# Patient Record
Sex: FEMALE | Race: OTHER | Employment: OTHER | URBAN - METROPOLITAN AREA
[De-identification: names, ages, dates, MRNs, and addresses within clinical notes are randomized per-mention and may not be internally consistent; named-entity substitution may affect disease eponyms.]

---

## 2018-05-30 ENCOUNTER — HOSPITAL ENCOUNTER (OUTPATIENT)
Age: 73
Discharge: HOME | End: 2018-05-30
Payer: MEDICARE

## 2018-05-30 LAB
ALBUMIN SERPL-MCNC: 4 G/DL (ref 3.5–5.2)
ALBUMIN/GLOBULIN RATIO: 1.4 (ref 1–2.5)
ALP BLD-CCNC: 65 U/L (ref 35–104)
ALT SERPL-CCNC: 16 U/L (ref 5–33)
ANION GAP SERPL CALCULATED.3IONS-SCNC: 10 MMOL/L (ref 9–17)
AST SERPL-CCNC: 23 U/L
BILIRUB SERPL-MCNC: 0.45 MG/DL (ref 0.3–1.2)
BUN BLDV-MCNC: 14 MG/DL (ref 8–23)
BUN/CREAT BLD: ABNORMAL (ref 9–20)
CALCIUM SERPL-MCNC: 9 MG/DL (ref 8.6–10.4)
CHLORIDE BLD-SCNC: 103 MMOL/L (ref 98–107)
CHOLESTEROL/HDL RATIO: 3.4
CHOLESTEROL: 185 MG/DL
CO2: 29 MMOL/L (ref 20–31)
CREAT SERPL-MCNC: 0.97 MG/DL (ref 0.5–0.9)
GFR AFRICAN AMERICAN: >60 ML/MIN
GFR NON-AFRICAN AMERICAN: 56 ML/MIN
GFR SERPL CREATININE-BSD FRML MDRD: ABNORMAL ML/MIN/{1.73_M2}
GFR SERPL CREATININE-BSD FRML MDRD: ABNORMAL ML/MIN/{1.73_M2}
GLUCOSE BLD-MCNC: 87 MG/DL (ref 70–99)
HDLC SERPL-MCNC: 55 MG/DL
LDL CHOLESTEROL: 97 MG/DL (ref 0–130)
POTASSIUM SERPL-SCNC: 4.6 MMOL/L (ref 3.7–5.3)
SODIUM BLD-SCNC: 142 MMOL/L (ref 135–144)
TOTAL PROTEIN: 6.9 G/DL (ref 6.4–8.3)
TRIGL SERPL-MCNC: 163 MG/DL
VLDLC SERPL CALC-MCNC: ABNORMAL MG/DL (ref 1–30)

## 2018-05-30 PROCEDURE — 36415 COLL VENOUS BLD VENIPUNCTURE: CPT

## 2018-05-30 PROCEDURE — 80061 LIPID PANEL: CPT

## 2018-05-30 PROCEDURE — 80053 COMPREHEN METABOLIC PANEL: CPT

## 2018-10-05 ENCOUNTER — HOSPITAL ENCOUNTER (OUTPATIENT)
Age: 73
Setting detail: SPECIMEN
Discharge: HOME | End: 2018-10-05
Payer: MEDICARE

## 2018-10-05 LAB
ABSOLUTE EOS #: 0.13 K/UL (ref 0–0.4)
ABSOLUTE IMMATURE GRANULOCYTE: 0 K/UL (ref 0–0.3)
ABSOLUTE LYMPH #: 1.04 K/UL (ref 1–4.8)
ABSOLUTE MONO #: 0.52 K/UL (ref 0.1–0.8)
ALBUMIN SERPL-MCNC: 3.8 G/DL (ref 3.5–5.2)
ALBUMIN/GLOBULIN RATIO: 1.5 (ref 1–2.5)
ALP BLD-CCNC: 50 U/L (ref 35–104)
ALT SERPL-CCNC: 10 U/L (ref 5–33)
ANION GAP SERPL CALCULATED.3IONS-SCNC: 10 MMOL/L (ref 9–17)
AST SERPL-CCNC: 17 U/L
BASOPHILS # BLD: 1 % (ref 0–2)
BASOPHILS ABSOLUTE: 0.07 K/UL (ref 0–0.2)
BILIRUB SERPL-MCNC: 0.51 MG/DL (ref 0.3–1.2)
BUN BLDV-MCNC: 14 MG/DL (ref 8–23)
BUN/CREAT BLD: ABNORMAL (ref 9–20)
CALCIUM SERPL-MCNC: 8.8 MG/DL (ref 8.6–10.4)
CAMPYLOBACTER PCR: NORMAL
CHLORIDE BLD-SCNC: 102 MMOL/L (ref 98–107)
CO2: 31 MMOL/L (ref 20–31)
CREAT SERPL-MCNC: 0.9 MG/DL (ref 0.5–0.9)
DIFFERENTIAL TYPE: ABNORMAL
EOSINOPHILS RELATIVE PERCENT: 2 % (ref 1–4)
GFR AFRICAN AMERICAN: >60 ML/MIN
GFR NON-AFRICAN AMERICAN: >60 ML/MIN
GFR SERPL CREATININE-BSD FRML MDRD: ABNORMAL ML/MIN/{1.73_M2}
GFR SERPL CREATININE-BSD FRML MDRD: ABNORMAL ML/MIN/{1.73_M2}
GLUCOSE BLD-MCNC: 87 MG/DL (ref 70–99)
HCT VFR BLD CALC: 38.4 % (ref 36.3–47.1)
HEMOGLOBIN: 11.7 G/DL (ref 11.9–15.1)
IMMATURE GRANULOCYTES: 0 %
LYMPHOCYTES # BLD: 16 % (ref 24–44)
MCH RBC QN AUTO: 29.6 PG (ref 25.2–33.5)
MCHC RBC AUTO-ENTMCNC: 30.5 G/DL (ref 28.4–34.8)
MCV RBC AUTO: 97.2 FL (ref 82.6–102.9)
MONOCYTES # BLD: 8 % (ref 1–7)
MORPHOLOGY: NORMAL
NRBC AUTOMATED: 0 PER 100 WBC
PDW BLD-RTO: 13.2 % (ref 11.8–14.4)
PLATELET # BLD: 211 K/UL (ref 138–453)
PLATELET ESTIMATE: ABNORMAL
PMV BLD AUTO: 12.2 FL (ref 8.1–13.5)
POTASSIUM SERPL-SCNC: 3.5 MMOL/L (ref 3.7–5.3)
RBC # BLD: 3.95 M/UL (ref 3.95–5.11)
RBC # BLD: ABNORMAL 10*6/UL
SALMONELLA PCR: NORMAL
SEG NEUTROPHILS: 73 % (ref 36–66)
SEGMENTED NEUTROPHILS ABSOLUTE COUNT: 4.74 K/UL (ref 1.8–7.7)
SHIGATOXIN GENE PCR: NORMAL
SHIGELLA SP PCR: NORMAL
SODIUM BLD-SCNC: 143 MMOL/L (ref 135–144)
SPECIMEN: NORMAL
TOTAL PROTEIN: 6.3 G/DL (ref 6.4–8.3)
WBC # BLD: 6.5 K/UL (ref 3.5–11.3)
WBC # BLD: ABNORMAL 10*3/UL

## 2018-10-13 ENCOUNTER — APPOINTMENT (OUTPATIENT)
Dept: CT IMAGING | Age: 73
DRG: 373 | End: 2018-10-13
Payer: MEDICARE

## 2018-10-13 ENCOUNTER — HOSPITAL ENCOUNTER (INPATIENT)
Age: 73
LOS: 3 days | Discharge: HOME | DRG: 373 | End: 2018-10-16
Attending: FAMILY MEDICINE | Admitting: INTERNAL MEDICINE
Payer: MEDICARE

## 2018-10-13 DIAGNOSIS — A04.72 C. DIFFICILE ENTERITIS: Primary | ICD-10-CM

## 2018-10-13 DIAGNOSIS — K52.9 COLITIS: ICD-10-CM

## 2018-10-13 PROBLEM — N83.202 LEFT OVARIAN CYST: Status: ACTIVE | Noted: 2018-10-13

## 2018-10-13 PROBLEM — R19.7 DIARRHEA OF PRESUMED INFECTIOUS ORIGIN: Status: ACTIVE | Noted: 2018-10-13

## 2018-10-13 PROBLEM — K62.89 PROCTITIS: Status: ACTIVE | Noted: 2018-10-13

## 2018-10-13 LAB
ABSOLUTE EOS #: 0.1 K/UL (ref 0–0.4)
ABSOLUTE IMMATURE GRANULOCYTE: ABNORMAL K/UL (ref 0–0.3)
ABSOLUTE LYMPH #: 1.2 K/UL (ref 1–4.8)
ABSOLUTE MONO #: 0.3 K/UL (ref 0.2–0.8)
ALBUMIN SERPL-MCNC: 3.5 G/DL (ref 3.5–5.2)
ALBUMIN/GLOBULIN RATIO: ABNORMAL (ref 1–2.5)
ALP BLD-CCNC: 47 U/L (ref 35–104)
ALT SERPL-CCNC: 11 U/L (ref 5–33)
ANION GAP SERPL CALCULATED.3IONS-SCNC: 10 MMOL/L (ref 9–17)
AST SERPL-CCNC: 23 U/L
BASOPHILS # BLD: 0 % (ref 0–2)
BASOPHILS ABSOLUTE: 0 K/UL (ref 0–0.2)
BILIRUB SERPL-MCNC: 0.16 MG/DL (ref 0.3–1.2)
BILIRUBIN, POC: NEGATIVE
BLOOD URINE, POC: NEGATIVE
BUN BLDV-MCNC: 12 MG/DL (ref 8–23)
BUN/CREAT BLD: 15 (ref 9–20)
CALCIUM SERPL-MCNC: 8.8 MG/DL (ref 8.6–10.4)
CHLORIDE BLD-SCNC: 103 MMOL/L (ref 98–107)
CHP ED QC CHECK: NORMAL
CLARITY, POC: CLEAR
CO2: 27 MMOL/L (ref 20–31)
COLOR, POC: YELLOW
CREAT SERPL-MCNC: 0.79 MG/DL (ref 0.5–0.9)
DATE, STOOL #1: NORMAL
DATE, STOOL #2: NORMAL
DATE, STOOL #3: NORMAL
DIFFERENTIAL TYPE: ABNORMAL
EOSINOPHILS RELATIVE PERCENT: 1 % (ref 1–4)
GFR AFRICAN AMERICAN: >60 ML/MIN
GFR NON-AFRICAN AMERICAN: >60 ML/MIN
GFR SERPL CREATININE-BSD FRML MDRD: ABNORMAL ML/MIN/{1.73_M2}
GFR SERPL CREATININE-BSD FRML MDRD: ABNORMAL ML/MIN/{1.73_M2}
GLUCOSE BLD-MCNC: 112 MG/DL (ref 70–99)
GLUCOSE URINE, POC: NEGATIVE
HCT VFR BLD CALC: 35 % (ref 36–46)
HEMOCCULT SP1 STL QL: NEGATIVE
HEMOCCULT SP2 STL QL: NORMAL
HEMOCCULT SP3 STL QL: NORMAL
HEMOGLOBIN: 11.8 G/DL (ref 12–16)
IMMATURE GRANULOCYTES: ABNORMAL %
KETONES, POC: NEGATIVE
LACTIC ACID: 2.2 MMOL/L (ref 0.5–2.2)
LEUKOCYTE EST, POC: NEGATIVE
LYMPHOCYTES # BLD: 20 % (ref 24–44)
MCH RBC QN AUTO: 31.2 PG (ref 26–34)
MCHC RBC AUTO-ENTMCNC: 33.7 G/DL (ref 31–37)
MCV RBC AUTO: 92.7 FL (ref 80–100)
MONOCYTES # BLD: 6 % (ref 1–7)
NITRITE, POC: NEGATIVE
NRBC AUTOMATED: ABNORMAL PER 100 WBC
PDW BLD-RTO: 13.9 % (ref 11.5–14.5)
PH, POC: 6.5
PLATELET # BLD: 236 K/UL (ref 130–400)
PLATELET ESTIMATE: ABNORMAL
PMV BLD AUTO: 8.4 FL (ref 6–12)
POTASSIUM SERPL-SCNC: 4.5 MMOL/L (ref 3.7–5.3)
PROTEIN, POC: NEGATIVE
RBC # BLD: 3.78 M/UL (ref 4–5.2)
RBC # BLD: ABNORMAL 10*6/UL
SEG NEUTROPHILS: 73 % (ref 36–66)
SEGMENTED NEUTROPHILS ABSOLUTE COUNT: 4.2 K/UL (ref 1.8–7.7)
SODIUM BLD-SCNC: 140 MMOL/L (ref 135–144)
SPECIFIC GRAVITY, POC: 1.01
TIME, STOOL #1: 2200
TIME, STOOL #2: NORMAL
TIME, STOOL #3: NORMAL
TOTAL PROTEIN: 6.1 G/DL (ref 6.4–8.3)
UROBILINOGEN, POC: NEGATIVE
WBC # BLD: 5.8 K/UL (ref 3.5–11)
WBC # BLD: ABNORMAL 10*3/UL

## 2018-10-13 PROCEDURE — 87505 NFCT AGENT DETECTION GI: CPT

## 2018-10-13 PROCEDURE — 1200000000 HC SEMI PRIVATE

## 2018-10-13 PROCEDURE — 81003 URINALYSIS AUTO W/O SCOPE: CPT

## 2018-10-13 PROCEDURE — 87328 CRYPTOSPORIDIUM AG IA: CPT

## 2018-10-13 PROCEDURE — 74177 CT ABD & PELVIS W/CONTRAST: CPT

## 2018-10-13 PROCEDURE — 2580000003 HC RX 258: Performed by: INTERNAL MEDICINE

## 2018-10-13 PROCEDURE — 6360000002 HC RX W HCPCS: Performed by: INTERNAL MEDICINE

## 2018-10-13 PROCEDURE — 87329 GIARDIA AG IA: CPT

## 2018-10-13 PROCEDURE — 6370000000 HC RX 637 (ALT 250 FOR IP): Performed by: INTERNAL MEDICINE

## 2018-10-13 PROCEDURE — 80053 COMPREHEN METABOLIC PANEL: CPT

## 2018-10-13 PROCEDURE — 87493 C DIFF AMPLIFIED PROBE: CPT

## 2018-10-13 PROCEDURE — 99285 EMERGENCY DEPT VISIT HI MDM: CPT

## 2018-10-13 PROCEDURE — 82272 OCCULT BLD FECES 1-3 TESTS: CPT

## 2018-10-13 PROCEDURE — 83605 ASSAY OF LACTIC ACID: CPT

## 2018-10-13 PROCEDURE — 6360000004 HC RX CONTRAST MEDICATION: Performed by: FAMILY MEDICINE

## 2018-10-13 PROCEDURE — 2580000003 HC RX 258: Performed by: FAMILY MEDICINE

## 2018-10-13 PROCEDURE — 85025 COMPLETE CBC W/AUTO DIFF WBC: CPT

## 2018-10-13 PROCEDURE — 2500000003 HC RX 250 WO HCPCS: Performed by: INTERNAL MEDICINE

## 2018-10-13 RX ORDER — SODIUM CHLORIDE 0.9 % (FLUSH) 0.9 %
10 SYRINGE (ML) INJECTION PRN
Status: DISCONTINUED | OUTPATIENT
Start: 2018-10-13 | End: 2018-10-16 | Stop reason: HOSPADM

## 2018-10-13 RX ORDER — ZOLPIDEM TARTRATE 10 MG/1
TABLET ORAL NIGHTLY PRN
COMMUNITY
End: 2018-12-10 | Stop reason: DRUGHIGH

## 2018-10-13 RX ORDER — TIZANIDINE 2 MG/1
2 TABLET ORAL 2 TIMES DAILY PRN
COMMUNITY
End: 2019-03-07 | Stop reason: SDUPTHER

## 2018-10-13 RX ORDER — TRAZODONE HYDROCHLORIDE 50 MG/1
50 TABLET ORAL NIGHTLY
Status: DISCONTINUED | OUTPATIENT
Start: 2018-10-13 | End: 2018-10-16 | Stop reason: HOSPADM

## 2018-10-13 RX ORDER — DICLOFENAC POTASSIUM 50 MG/1
50 TABLET, FILM COATED ORAL 3 TIMES DAILY
Status: ON HOLD | COMMUNITY
End: 2018-10-15 | Stop reason: HOSPADM

## 2018-10-13 RX ORDER — PHENOL 1.4 %
1 AEROSOL, SPRAY (ML) MUCOUS MEMBRANE 2 TIMES DAILY
COMMUNITY
End: 2019-12-09 | Stop reason: SDUPTHER

## 2018-10-13 RX ORDER — SODIUM CHLORIDE 9 MG/ML
INJECTION, SOLUTION INTRAVENOUS CONTINUOUS
Status: DISCONTINUED | OUTPATIENT
Start: 2018-10-13 | End: 2018-10-16 | Stop reason: HOSPADM

## 2018-10-13 RX ORDER — GABAPENTIN 600 MG/1
600 TABLET ORAL 3 TIMES DAILY
COMMUNITY
End: 2019-03-07 | Stop reason: DRUGHIGH

## 2018-10-13 RX ORDER — OMEPRAZOLE 20 MG/1
40 CAPSULE, DELAYED RELEASE ORAL DAILY
COMMUNITY
End: 2018-12-10 | Stop reason: ALTCHOICE

## 2018-10-13 RX ORDER — 0.9 % SODIUM CHLORIDE 0.9 %
80 INTRAVENOUS SOLUTION INTRAVENOUS ONCE
Status: COMPLETED | OUTPATIENT
Start: 2018-10-13 | End: 2018-10-13

## 2018-10-13 RX ORDER — GABAPENTIN 300 MG/1
600 CAPSULE ORAL 3 TIMES DAILY
Status: DISCONTINUED | OUTPATIENT
Start: 2018-10-13 | End: 2018-10-16 | Stop reason: HOSPADM

## 2018-10-13 RX ORDER — TRAMADOL HYDROCHLORIDE 50 MG/1
50 TABLET ORAL 2 TIMES DAILY
Status: ON HOLD | COMMUNITY
End: 2018-10-15

## 2018-10-13 RX ORDER — 0.9 % SODIUM CHLORIDE 0.9 %
30 INTRAVENOUS SOLUTION INTRAVENOUS ONCE
Status: COMPLETED | OUTPATIENT
Start: 2018-10-13 | End: 2018-10-13

## 2018-10-13 RX ORDER — CIPROFLOXACIN 2 MG/ML
400 INJECTION, SOLUTION INTRAVENOUS EVERY 12 HOURS
Status: DISCONTINUED | OUTPATIENT
Start: 2018-10-13 | End: 2018-10-14

## 2018-10-13 RX ORDER — TRAZODONE HYDROCHLORIDE 50 MG/1
50 TABLET ORAL NIGHTLY
COMMUNITY
End: 2019-03-07 | Stop reason: SDUPTHER

## 2018-10-13 RX ORDER — DIPHENOXYLATE HYDROCHLORIDE AND ATROPINE SULFATE 2.5; .025 MG/1; MG/1
1 TABLET ORAL 4 TIMES DAILY PRN
Status: ON HOLD | COMMUNITY
End: 2018-10-15 | Stop reason: HOSPADM

## 2018-10-13 RX ADMIN — GABAPENTIN 600 MG: 300 CAPSULE ORAL at 22:00

## 2018-10-13 RX ADMIN — SODIUM CHLORIDE 1770 ML: 9 INJECTION, SOLUTION INTRAVENOUS at 15:38

## 2018-10-13 RX ADMIN — IOPAMIDOL 75 ML: 755 INJECTION, SOLUTION INTRAVENOUS at 17:13

## 2018-10-13 RX ADMIN — SODIUM CHLORIDE: 9 INJECTION, SOLUTION INTRAVENOUS at 21:21

## 2018-10-13 RX ADMIN — TRAZODONE HYDROCHLORIDE 50 MG: 50 TABLET ORAL at 21:21

## 2018-10-13 RX ADMIN — METRONIDAZOLE 500 MG: 500 INJECTION, SOLUTION INTRAVENOUS at 22:26

## 2018-10-13 RX ADMIN — Medication 10 ML: at 17:14

## 2018-10-13 RX ADMIN — CIPROFLOXACIN 400 MG: 2 INJECTION, SOLUTION INTRAVENOUS at 21:21

## 2018-10-13 RX ADMIN — SODIUM CHLORIDE 80 ML: 0.9 INJECTION, SOLUTION INTRAVENOUS at 18:46

## 2018-10-13 ASSESSMENT — ENCOUNTER SYMPTOMS
DIARRHEA: 1
EYES NEGATIVE: 1
RESPIRATORY NEGATIVE: 1
ALLERGIC/IMMUNOLOGIC NEGATIVE: 1

## 2018-10-13 ASSESSMENT — PAIN SCALES - GENERAL
PAINLEVEL_OUTOF10: 0
PAINLEVEL_OUTOF10: 0

## 2018-10-13 NOTE — ED NOTES
Pt. Presents to ed c/o diarrhea that has been intermittent for 6 weeks. She did go to her PCP recently and received blood work and stool cultures. Bowel sounds active x 4 quads. MM pink and dry. Vital signs wnl. Stool cultures and labs from 10/5/18 were negative.       Ciara Mcdonnell RN  10/13/18 4708

## 2018-10-13 NOTE — ED PROVIDER NOTES
lobe, likely scarring or subsegmental atelectasis. Minimal gravity dependent atelectasis in the bilateral lower lobes. Normal heart size. No pleural nor pericardial effusions. ORGANS:  Surgically absent gallbladder. No unexpected intrahepatic nor extrahepatic biliary dilation. Moderate to marked pancreatic atrophy. 1.0 cm simple appearing cyst in the posterior lower pole of the left kidney (Bosniak category 1). Normal liver, spleen, adrenal glands, and right kidney. No calculi nor hydronephrosis. GI/BOWEL:  Normal course and caliber of the stomach, small bowel, colon, and rectum without obstruction. Fluid distention of the few nondilated small bowel loops predominantly in the ileum. No visualization of the appendix, reportedly surgically absent. Suspected diffuse wall thickening involving the left hemicolon and rectum with associated adjacent stranding. PELVIS:  Surgically absent uterus. Age-appropriate atrophy of the ovaries. 1.7 cm x 1.1 cm simple appearing left ovarian cystic lesion. Normal urinary bladder. Laxity of the pelvic floor musculature. PERITONEUM/RETROPERITONEUM:  Minimal atherosclerosis. No abdominal nor pelvic lymphadenopathy. No free intraperitoneal fluid nor gas. SOFT TISSUES/BONES:  Laxity of the anterior and lateral abdominal wall musculature. A few tiny to small fat containing supraumbilical ventral hernias associated with a healed laparotomy incision. No inguinal lymphadenopathy. Diffuse osseous demineralization. No acute fractures nor suspicious osseous lesions. 1. Suspected colitis in the left hemicolon as well as suspected proctitis. An infectious or inflammatory etiology is favored. 2. Fluid distention of some small bowel loops, a finding that can be seen with mild enteritis. 3. A few additional incidental findings as above for which no follow-up is recommended.  RECOMMENDATIONS: Managing Incidental Adnexal Cystic Mass by CT or MR Benign cyst: Late postmenopausal < or 10/15/2018 05:16 AM    GLUCOSE 93 10/15/2018 05:16 AM     Lab Results   Component Value Date/Time    ALT 13 10/15/2018 05:16 AM    AST 25 10/15/2018 05:16 AM     No results found for: PROTIME, INR, APTT  Lab Results   Component Value Date/Time    COLORU yellow 10/13/2018 03:51 PM    SPECGRAV 1.010 10/13/2018 03:51 PM    KETUA negative 10/13/2018 03:51 PM    LEUKOCYTESUR negative 10/13/2018 03:51 PM    GLUCOSEU negative 10/13/2018 03:51 PM     No results found for: PROBNP  No results found for: CRP      EMERGENCY DEPARTMENT COURSE:   Vitals:    Vitals:    10/15/18 0420 10/15/18 0703 10/15/18 1942 10/16/18 0716   BP: (!) 113/47 (!) 119/59 125/63 117/74   Pulse: 66 73 81 77   Resp: 20 16 16 20   Temp: 98.4 °F (36.9 °C) 98.6 °F (37 °C) 98.8 °F (37.1 °C) 98.6 °F (37 °C)   TempSrc: Oral Oral Oral Oral   SpO2: 97% 97% 100% 99%   Weight:       Height:         4:07 PM patient requesting something to eat. 5:10 PM she just went down for CT scanning  Sepsis Reexam(sepsisrepeatexam)       PROCEEDURE:   Procedures         INCIDENTAL FINDINGS   None          CRITICAL CARE:   none    CONSULTS:  IP CONSULT TO HOSPITALIST  IP CONSULT TO HOSPITALIST      ED ORDERS:  Orders Placed This Encounter   Procedures    C difficile tox, PCR     Standing Status:   Standing     Number of Occurrences:   1    Cult,Stool     Standing Status:   Standing     Number of Occurrences:   1    CT ABDOMEN PELVIS W IV CONTRAST Additional Contrast? None     Please await creatinine level.      Standing Status:   Standing     Number of Occurrences:   1     Order Specific Question:   Additional Contrast?     Answer:   None    Comprehensive Metabolic Panel w/ Reflex to MG     Standing Status:   Standing     Number of Occurrences:   1    CBC Auto Differential     Standing Status:   Standing     Number of Occurrences:   1    Lactic Acid     Standing Status:   Standing     Number of Occurrences:   1    Basic Metabolic Prof     Standing Status:   Standing Number of Occurrences:   1    CBC with DIFF     Standing Status:   Standing     Number of Occurrences:   1    BLOOD OCCULT STOOL #1     Standing Status:   Standing     Number of Occurrences:   1    Giardia / Cryptosporidum antigens     Standing Status:   Standing     Number of Occurrences:   1    CBC Auto Differential     Standing Status:   Standing     Number of Occurrences:   1    Comprehensive Metabolic Panel     Standing Status:   Standing     Number of Occurrences:   1    LAB SCANNED REPORT    Telemetry Monitoring     Standing Status:   Standing     Number of Occurrences:   1     Order Specific Question:   Patient may go off unit without monitor     Answer:   No    Inpatient consult to Hospitalist     Standing Status:   Standing     Number of Occurrences:   1     Order Specific Question:   Reason for Consult? Answer:   colitis    Inpatient consult to Hospitalist     Standing Status:   Standing     Number of Occurrences:   1     Order Specific Question:   Reason for Consult? Answer:   colitis    POCT Urinalysis no Micro     Standing Status:   Standing     Number of Occurrences:   1    RHYTHM STRIP REPORT    Insert peripheral IV     Standing Status:   Standing     Number of Occurrences:   1    PATIENT STATUS (FROM ED OR OR/PROCEDURAL) Inpatient     He called  for further orders     Standing Status:   Standing     Number of Occurrences:   1     Order Specific Question:   Patient Class     Answer:   Inpatient [101]     Order Specific Question:   REQUIRED: Diagnosis     Answer:   Colitis [[id number]]     Order Specific Question:   Estimated Length of Stay     Answer:   Estimated stay of more than 2 midnights     Order Specific Question:   Future Attending Provider     Answer:   Ivory Carrera [[id number]]     Order Specific Question:   Admitting Provider     Answer:   Ivory Carrera [[id number]]     Order Specific Question:   Telemetry Bed Required? Answer:    Yes    PATIENT STATUS (FROM ED OR OR/PROCEDURAL) Inpatient     Standing Status:   Standing     Number of Occurrences:   1     Order Specific Question:   Patient Class     Answer:   Inpatient [101]     Order Specific Question:   REQUIRED: Diagnosis     Answer:   Colitis [[id number]]     Order Specific Question:   Estimated Length of Stay     Answer:   Estimated stay of more than 2 midnights     Order Specific Question:   Future Attending Provider     Answer:   Jovanni Franklin [[id number]]     Order Specific Question:   Telemetry Bed Required? Answer: Yes    Discharge patient     If patient remains stable and tolerating diet. Standing Status:   Standing     Number of Occurrences:   1     Order Specific Question:   Discharge Disposition     Answer:   Home       FINAL IMPRESSION      1. C. difficile enteritis          DISPOSITION/PLAN     Admit to Floor  DISPOSITION Admitted 10/13/2018 06:48:01 PM    Pt/Family with pt aware to return to the ER if symptoms change or change for the worse. Pt aware to call if any questions. Pt/Family with pt has been instructed to keepmedications away from the public at all times. Pt/Family with pt  aware not to operate machinery of any kind while taking pain meds, nerve pills or muscle relaxers. PATIENT REFERRED TO:  Rivka Cardenas MD  [id number] [facility]  [phone]    Go on 10/26/2018  Appointment 10-26-18 at [facility]:  Discharge Medication List as of 10/16/2018  2:28 PM      START taking these medications    Details   ch[facility]) 50 MG/ML SOLR oral solution Take 5 mLs by mouth 4 times daily for 14 days Further refills by PCP if required. , Disp-105 mL, R-2Print      Lactobacillus (PROBIOTIC ACIDOPHILUS) TABS Take 1 tablet by mouth daily Further refills by PCP., Disp-20 tablet, R-0Print             (Pleasenote that portions of this note were completed with a voice recognition program.  Efforts were made to edit the dictations but

## 2018-10-13 NOTE — H&P
Department of Internal Medicine  Attending History and Physical        CHIEF COMPLAINT:  Diarrhea    History Obtained From:  Patient, records    HISTORY OF PRESENT ILLNESS:    75year old female admitted from the ER with a history of diarrhea for about a month. No associated nausea/vomiting. Denies ingesting raw/ocntaminated food, no foreign travel. Stools are soft/watery and non-bloody. No food intolerance. Denies any abdominal pain. She has had  off-and on bloatedness. She denies weight loss, chills/fever/joint pains or skin rash. No one in her household has similar symptoms. Past Medical/Surgical History:  Past Medical History:   Diagnosis Date    Acid reflux     Shingles          Past Surgical History:   Procedure Laterality Date    APPENDECTOMY      CHOLECYSTECTOMY         No current facility-administered medications on file prior to encounter. No current outpatient prescriptions on file prior to encounter. Allergies:  Patient has no known allergies. Social History:   Social History     Social History    Marital status:      Spouse name: N/A    Number of children: N/A    Years of education: N/A     Occupational History    Not on file. Social History Main Topics    Smoking status: Never Smoker    Smokeless tobacco: Not on file    Alcohol use No    Drug use: No    Sexual activity: Not on file     Other Topics Concern    Not on file     Social History Narrative    No narrative on file       Family History:   Negative for GI problems.     REVIEW OF SYSTEMS:  CONSTITUTIONAL:  negative for  fevers and chills  EYES:  negative for  double vision, blurred vision and dry eyes  HEENT:  negative for  hearing loss, tinnitus, ear drainage, earaches and epistaxis  RESPIRATORY:  negative for  dry cough, cough with sputum, dyspnea, wheezing, hemoptysis and pleuritic pain  CARDIOVASCULAR:  negative for  dyspnea, palpitations, orthopnea, PND, exertional chest

## 2018-10-14 LAB
ABSOLUTE EOS #: 0.1 K/UL (ref 0–0.4)
ABSOLUTE IMMATURE GRANULOCYTE: ABNORMAL K/UL (ref 0–0.3)
ABSOLUTE LYMPH #: 1.2 K/UL (ref 1–4.8)
ABSOLUTE MONO #: 0.3 K/UL (ref 0.2–0.8)
ANION GAP SERPL CALCULATED.3IONS-SCNC: 10 MMOL/L (ref 9–17)
BASOPHILS # BLD: 0 % (ref 0–2)
BASOPHILS ABSOLUTE: 0 K/UL (ref 0–0.2)
BUN BLDV-MCNC: 6 MG/DL (ref 8–23)
BUN/CREAT BLD: 8 (ref 9–20)
C DIFFICILE TOXINS, PCR: ABNORMAL
CALCIUM SERPL-MCNC: 8.2 MG/DL (ref 8.6–10.4)
CAMPYLOBACTER PCR: NORMAL
CHLORIDE BLD-SCNC: 103 MMOL/L (ref 98–107)
CO2: 28 MMOL/L (ref 20–31)
CREAT SERPL-MCNC: 0.75 MG/DL (ref 0.5–0.9)
DIFFERENTIAL TYPE: ABNORMAL
EOSINOPHILS RELATIVE PERCENT: 2 % (ref 1–4)
GFR AFRICAN AMERICAN: >60 ML/MIN
GFR NON-AFRICAN AMERICAN: >60 ML/MIN
GFR SERPL CREATININE-BSD FRML MDRD: ABNORMAL ML/MIN/{1.73_M2}
GFR SERPL CREATININE-BSD FRML MDRD: ABNORMAL ML/MIN/{1.73_M2}
GLUCOSE BLD-MCNC: 98 MG/DL (ref 70–99)
HCT VFR BLD CALC: 33.6 % (ref 36–46)
HEMOGLOBIN: 11.2 G/DL (ref 12–16)
IMMATURE GRANULOCYTES: ABNORMAL %
LYMPHOCYTES # BLD: 28 % (ref 24–44)
MCH RBC QN AUTO: 30.8 PG (ref 26–34)
MCHC RBC AUTO-ENTMCNC: 33.4 G/DL (ref 31–37)
MCV RBC AUTO: 92.3 FL (ref 80–100)
MONOCYTES # BLD: 8 % (ref 1–7)
NRBC AUTOMATED: ABNORMAL PER 100 WBC
PDW BLD-RTO: 13.8 % (ref 11.5–14.5)
PLATELET # BLD: 219 K/UL (ref 130–400)
PLATELET ESTIMATE: ABNORMAL
PMV BLD AUTO: 8.4 FL (ref 6–12)
POTASSIUM SERPL-SCNC: 3.7 MMOL/L (ref 3.7–5.3)
RBC # BLD: 3.64 M/UL (ref 4–5.2)
RBC # BLD: ABNORMAL 10*6/UL
SALMONELLA PCR: NORMAL
SEG NEUTROPHILS: 62 % (ref 36–66)
SEGMENTED NEUTROPHILS ABSOLUTE COUNT: 2.7 K/UL (ref 1.8–7.7)
SHIGATOXIN GENE PCR: NORMAL
SHIGELLA SP PCR: NORMAL
SODIUM BLD-SCNC: 141 MMOL/L (ref 135–144)
SPECIMEN DESCRIPTION: ABNORMAL
SPECIMEN: NORMAL
WBC # BLD: 4.3 K/UL (ref 3.5–11)
WBC # BLD: ABNORMAL 10*3/UL

## 2018-10-14 PROCEDURE — 85025 COMPLETE CBC W/AUTO DIFF WBC: CPT

## 2018-10-14 PROCEDURE — 2500000003 HC RX 250 WO HCPCS: Performed by: INTERNAL MEDICINE

## 2018-10-14 PROCEDURE — 2580000003 HC RX 258: Performed by: INTERNAL MEDICINE

## 2018-10-14 PROCEDURE — 36415 COLL VENOUS BLD VENIPUNCTURE: CPT

## 2018-10-14 PROCEDURE — 1200000000 HC SEMI PRIVATE

## 2018-10-14 PROCEDURE — 80048 BASIC METABOLIC PNL TOTAL CA: CPT

## 2018-10-14 PROCEDURE — 99222 1ST HOSP IP/OBS MODERATE 55: CPT | Performed by: INTERNAL MEDICINE

## 2018-10-14 PROCEDURE — 6360000002 HC RX W HCPCS: Performed by: INTERNAL MEDICINE

## 2018-10-14 PROCEDURE — 6370000000 HC RX 637 (ALT 250 FOR IP): Performed by: INTERNAL MEDICINE

## 2018-10-14 RX ORDER — ACETAMINOPHEN 325 MG/1
325 TABLET ORAL EVERY 4 HOURS PRN
Status: DISCONTINUED | OUTPATIENT
Start: 2018-10-14 | End: 2018-10-16 | Stop reason: HOSPADM

## 2018-10-14 RX ORDER — ONDANSETRON 4 MG/1
4 TABLET, ORALLY DISINTEGRATING ORAL EVERY 6 HOURS PRN
Status: DISCONTINUED | OUTPATIENT
Start: 2018-10-14 | End: 2018-10-16 | Stop reason: HOSPADM

## 2018-10-14 RX ORDER — ONDANSETRON 2 MG/ML
4 INJECTION INTRAMUSCULAR; INTRAVENOUS EVERY 6 HOURS PRN
Status: DISCONTINUED | OUTPATIENT
Start: 2018-10-14 | End: 2018-10-14 | Stop reason: SDUPTHER

## 2018-10-14 RX ORDER — ONDANSETRON 2 MG/ML
4 INJECTION INTRAMUSCULAR; INTRAVENOUS EVERY 6 HOURS PRN
Status: DISCONTINUED | OUTPATIENT
Start: 2018-10-14 | End: 2018-10-16 | Stop reason: HOSPADM

## 2018-10-14 RX ADMIN — GABAPENTIN 600 MG: 300 CAPSULE ORAL at 08:16

## 2018-10-14 RX ADMIN — METRONIDAZOLE 500 MG: 500 INJECTION, SOLUTION INTRAVENOUS at 12:20

## 2018-10-14 RX ADMIN — SODIUM CHLORIDE: 9 INJECTION, SOLUTION INTRAVENOUS at 17:24

## 2018-10-14 RX ADMIN — Medication 250 MG: at 17:25

## 2018-10-14 RX ADMIN — TRAZODONE HYDROCHLORIDE 50 MG: 50 TABLET ORAL at 20:59

## 2018-10-14 RX ADMIN — SODIUM CHLORIDE: 9 INJECTION, SOLUTION INTRAVENOUS at 06:31

## 2018-10-14 RX ADMIN — Medication 250 MG: at 20:59

## 2018-10-14 RX ADMIN — GABAPENTIN 600 MG: 300 CAPSULE ORAL at 20:59

## 2018-10-14 RX ADMIN — GABAPENTIN 600 MG: 300 CAPSULE ORAL at 13:30

## 2018-10-14 RX ADMIN — CIPROFLOXACIN 400 MG: 2 INJECTION, SOLUTION INTRAVENOUS at 08:17

## 2018-10-14 RX ADMIN — METRONIDAZOLE 500 MG: 500 INJECTION, SOLUTION INTRAVENOUS at 20:59

## 2018-10-14 RX ADMIN — METRONIDAZOLE 500 MG: 500 INJECTION, SOLUTION INTRAVENOUS at 05:03

## 2018-10-14 RX ADMIN — ACETAMINOPHEN 325 MG: 325 TABLET ORAL at 22:33

## 2018-10-14 ASSESSMENT — PAIN DESCRIPTION - PROGRESSION: CLINICAL_PROGRESSION: GRADUALLY WORSENING

## 2018-10-14 ASSESSMENT — PAIN DESCRIPTION - FREQUENCY: FREQUENCY: CONTINUOUS

## 2018-10-14 ASSESSMENT — PAIN SCALES - GENERAL
PAINLEVEL_OUTOF10: 0
PAINLEVEL_OUTOF10: 0
PAINLEVEL_OUTOF10: 9

## 2018-10-14 ASSESSMENT — PAIN DESCRIPTION - LOCATION: LOCATION: HEAD

## 2018-10-14 ASSESSMENT — PAIN DESCRIPTION - DESCRIPTORS: DESCRIPTORS: HEADACHE

## 2018-10-14 ASSESSMENT — PAIN DESCRIPTION - ONSET: ONSET: ON-GOING

## 2018-10-14 ASSESSMENT — PAIN DESCRIPTION - PAIN TYPE: TYPE: ACUTE PAIN

## 2018-10-14 NOTE — CARE COORDINATION
Case Management Initial Discharge Plan  Dennis Hickey with:patient to discuss discharge plans. Information verified: address, contacts, phone number, , insurance Yes  PCP: Rhoda Mtz  Date of last visit: last week    Insurance Provider: [employer]    Discharge Planning    Living Arrangements:  Family Members (daughter and her family)  Support Systems:  Family Members    Home has 2 stories  4 stairs to climb to get into front door, 12 stairs to climb to reach second floor  Location of bedroom/bathroom in home  - second floor    Patient able to perform ADL's:Independent    Current Services (outpatient & in home) none  DME equipment: none  DME provider:  N/A    Pharmacy: [facility] - unsure of location   Potential Assistance Purchasing Medications:  No  Does patient want to participate in local refill/ meds to beds program?  Yes    Potential Assistance Needed:  N/A    Patient agreeable to home care: No  [place] of choice provided:  n/a    Prior SNF/Rehab Placement and Facility: No  Agreeable to SNF/Rehab: No  [place] of choice provided: n/a   Evaluation: n/a    Expected Discharge date:  10/15/18  Patient expects to be discharged to:  home  Follow Up Appointment: Best Day/ Time:      Transportation provider: daughter  Transportation arrangements needed for discharge: No    Readmission Risk              Risk of Unplanned Readmission:        6               Does patient have a readmission risk score greater than 14?: No  If yes, follow-up appointment must be made within 7 days of discharge. Discharge Plan: Met with patient at bedside. Lives with daughter and her family and is independent. Has been having diarrhea for past month or more off and on. No home needs anticipated and continue to follow GI plan of care.           Electronically signed by Afshin King RN on 10/14/18 at 9:19 AM

## 2018-10-14 NOTE — CONSULTS
10/14/2018    RDW 13.8 10/14/2018    LYMPHOPCT 28 10/14/2018    MONOPCT 8 10/14/2018    BASOPCT 0 10/14/2018    MONOSABS 0.30 10/14/2018    LYMPHSABS 1.20 10/14/2018    EOSABS 0.10 10/14/2018    BASOSABS 0.00 10/14/2018    DIFFTYPE NOT REPORTED 10/14/2018     Hemoglobin/Hematocrit:    Lab Results   Component Value Date    HGB 11.2 10/14/2018    HCT 33.6 10/14/2018     CMP:    Lab Results   Component Value Date     10/14/2018    K 3.7 10/14/2018     10/14/2018    CO2 28 10/14/2018    BUN 6 10/14/2018    CREATININE 0.75 10/14/2018    GFRAA >60 10/14/2018    LABGLOM >60 10/14/2018    GLUCOSE 98 10/14/2018    PROT 6.1 10/13/2018    LABALBU 3.5 10/13/2018    CALCIUM 8.2 10/14/2018    BILITOT 0.16 10/13/2018    ALKPHOS 47 10/13/2018    AST 23 10/13/2018    ALT 11 10/13/2018     BMP:    Lab Results   Component Value Date     10/14/2018    K 3.7 10/14/2018     10/14/2018    CO2 28 10/14/2018    BUN 6 10/14/2018    LABALBU 3.5 10/13/2018    CREATININE 0.75 10/14/2018    CALCIUM 8.2 10/14/2018    GFRAA >60 10/14/2018    LABGLOM >60 10/14/2018    GLUCOSE 98 10/14/2018     PT/INR:  No results found for: PROTIME, INR  PTT:  No results found for: APTT, PTT[APTT}    Assesment:     Primary Problem  Colitis    Active Hospital Problems    Diagnosis Date Noted    Colitis [K52.9] 10/13/2018    Proctitis [K62.89] 10/13/2018    Diarrhea of presumed infectious origin [R19.7] 10/13/2018    Left ovarian cyst [N83.202] 10/13/2018     Abdominal pains  Diarrhea  Nausea and vomiting  Gastroenteritis  Abnormal imaging     Plan:     1. Aggressive hydration  2. Clears to full liquid diet  3. Stool studies  4. Follow-up labs  5. Check for C diff  6. She will need Colonoscopy later depending on above  7. Some lifestyle and dietary modifications were explained to her  8. We will reevaluate in the morning  9. Explained to the patient and discussed nursing staff  10.  The patient has verbalized understanding and agreement to this plan. Thank you for allowing me to participate in the care of your patient. Please feel free to contact me with any questions or concerns.      Electronically signed by Grover Bloom MD on 10/14/2018 at 1:47 PM     Copy sent to Dr. Tracie Elaine

## 2018-10-14 NOTE — FLOWSHEET NOTE
10/14/18 1418   Provider Notification   Reason for Communication Critical Value (comment)  (positive C-diff)   Provider Name Dr. Roxann Benitez    Provider Notification Physician   Method of Communication Call   Response See orders   Notification Time [phone]

## 2018-10-15 LAB
ABSOLUTE EOS #: 0.1 K/UL (ref 0–0.4)
ABSOLUTE IMMATURE GRANULOCYTE: ABNORMAL K/UL (ref 0–0.3)
ABSOLUTE LYMPH #: 1.3 K/UL (ref 1–4.8)
ABSOLUTE MONO #: 0.3 K/UL (ref 0.2–0.8)
ALBUMIN SERPL-MCNC: 3.3 G/DL (ref 3.5–5.2)
ALBUMIN/GLOBULIN RATIO: ABNORMAL (ref 1–2.5)
ALP BLD-CCNC: 45 U/L (ref 35–104)
ALT SERPL-CCNC: 13 U/L (ref 5–33)
ANION GAP SERPL CALCULATED.3IONS-SCNC: 10 MMOL/L (ref 9–17)
AST SERPL-CCNC: 25 U/L
BASOPHILS # BLD: 1 % (ref 0–2)
BASOPHILS ABSOLUTE: 0 K/UL (ref 0–0.2)
BILIRUB SERPL-MCNC: 0.17 MG/DL (ref 0.3–1.2)
BUN BLDV-MCNC: 3 MG/DL (ref 8–23)
BUN/CREAT BLD: 4 (ref 9–20)
CALCIUM SERPL-MCNC: 8.6 MG/DL (ref 8.6–10.4)
CHLORIDE BLD-SCNC: 107 MMOL/L (ref 98–107)
CO2: 27 MMOL/L (ref 20–31)
CREAT SERPL-MCNC: 0.79 MG/DL (ref 0.5–0.9)
DIFFERENTIAL TYPE: ABNORMAL
DIRECT EXAM: NORMAL
EOSINOPHILS RELATIVE PERCENT: 2 % (ref 1–4)
GFR AFRICAN AMERICAN: >60 ML/MIN
GFR NON-AFRICAN AMERICAN: >60 ML/MIN
GFR SERPL CREATININE-BSD FRML MDRD: ABNORMAL ML/MIN/{1.73_M2}
GFR SERPL CREATININE-BSD FRML MDRD: ABNORMAL ML/MIN/{1.73_M2}
GLUCOSE BLD-MCNC: 93 MG/DL (ref 70–99)
HCT VFR BLD CALC: 33.4 % (ref 36–46)
HEMOGLOBIN: 11.3 G/DL (ref 12–16)
IMMATURE GRANULOCYTES: ABNORMAL %
LYMPHOCYTES # BLD: 37 % (ref 24–44)
Lab: NORMAL
MCH RBC QN AUTO: 31.2 PG (ref 26–34)
MCHC RBC AUTO-ENTMCNC: 33.8 G/DL (ref 31–37)
MCV RBC AUTO: 92.5 FL (ref 80–100)
MONOCYTES # BLD: 10 % (ref 1–7)
NRBC AUTOMATED: ABNORMAL PER 100 WBC
PDW BLD-RTO: 14.2 % (ref 11.5–14.5)
PLATELET # BLD: 251 K/UL (ref 130–400)
PLATELET ESTIMATE: ABNORMAL
PMV BLD AUTO: 9.2 FL (ref 6–12)
POTASSIUM SERPL-SCNC: 3.8 MMOL/L (ref 3.7–5.3)
RBC # BLD: 3.61 M/UL (ref 4–5.2)
RBC # BLD: ABNORMAL 10*6/UL
SEG NEUTROPHILS: 50 % (ref 36–66)
SEGMENTED NEUTROPHILS ABSOLUTE COUNT: 1.7 K/UL (ref 1.8–7.7)
SODIUM BLD-SCNC: 144 MMOL/L (ref 135–144)
SPECIMEN DESCRIPTION: NORMAL
STATUS: NORMAL
TOTAL PROTEIN: 5.7 G/DL (ref 6.4–8.3)
WBC # BLD: 3.5 K/UL (ref 3.5–11)
WBC # BLD: ABNORMAL 10*3/UL

## 2018-10-15 PROCEDURE — 6370000000 HC RX 637 (ALT 250 FOR IP): Performed by: INTERNAL MEDICINE

## 2018-10-15 PROCEDURE — 80053 COMPREHEN METABOLIC PANEL: CPT

## 2018-10-15 PROCEDURE — G0009 ADMIN PNEUMOCOCCAL VACCINE: HCPCS | Performed by: INTERNAL MEDICINE

## 2018-10-15 PROCEDURE — 99232 SBSQ HOSP IP/OBS MODERATE 35: CPT | Performed by: INTERNAL MEDICINE

## 2018-10-15 PROCEDURE — 85025 COMPLETE CBC W/AUTO DIFF WBC: CPT

## 2018-10-15 PROCEDURE — 1200000000 HC SEMI PRIVATE

## 2018-10-15 PROCEDURE — 6360000002 HC RX W HCPCS: Performed by: INTERNAL MEDICINE

## 2018-10-15 PROCEDURE — 2500000003 HC RX 250 WO HCPCS: Performed by: INTERNAL MEDICINE

## 2018-10-15 PROCEDURE — G0008 ADMIN INFLUENZA VIRUS VAC: HCPCS | Performed by: INTERNAL MEDICINE

## 2018-10-15 PROCEDURE — 90686 IIV4 VACC NO PRSV 0.5 ML IM: CPT | Performed by: INTERNAL MEDICINE

## 2018-10-15 PROCEDURE — 36415 COLL VENOUS BLD VENIPUNCTURE: CPT

## 2018-10-15 PROCEDURE — 90670 PCV13 VACCINE IM: CPT | Performed by: INTERNAL MEDICINE

## 2018-10-15 PROCEDURE — 2580000003 HC RX 258: Performed by: INTERNAL MEDICINE

## 2018-10-15 RX ORDER — GREEN TEA/HOODIA GORDONII 315-12.5MG
1 CAPSULE ORAL DAILY
Qty: 20 TABLET | Refills: 0 | Status: SHIPPED | OUTPATIENT
Start: 2018-10-15 | End: 2019-06-07 | Stop reason: SDUPTHER

## 2018-10-15 RX ADMIN — INFLUENZA A VIRUS A/MICHIGAN/45/2015 X-275 (H1N1) ANTIGEN (FORMALDEHYDE INACTIVATED), INFLUENZA A VIRUS A/SINGAPORE/INFIMH-16-0019/2016 IVR-186 (H3N2) ANTIGEN (FORMALDEHYDE INACTIVATED), INFLUENZA B VIRUS B/PHUKET/3073/2013 ANTIGEN (FORMALDEHYDE INACTIVATED), AND INFLUENZA B VIRUS B/MARYLAND/15/2016 BX-69A ANTIGEN (FORMALDEHYDE INACTIVATED) 0.5 ML: 15; 15; 15; 15 INJECTION, SUSPENSION INTRAMUSCULAR at 08:26

## 2018-10-15 RX ADMIN — GABAPENTIN 600 MG: 300 CAPSULE ORAL at 13:36

## 2018-10-15 RX ADMIN — METRONIDAZOLE 500 MG: 500 INJECTION, SOLUTION INTRAVENOUS at 13:36

## 2018-10-15 RX ADMIN — Medication 250 MG: at 21:04

## 2018-10-15 RX ADMIN — GABAPENTIN 600 MG: 300 CAPSULE ORAL at 08:27

## 2018-10-15 RX ADMIN — GABAPENTIN 600 MG: 300 CAPSULE ORAL at 21:04

## 2018-10-15 RX ADMIN — PNEUMOCOCCAL 13-VALENT CONJUGATE VACCINE 0.5 ML: 2.2; 2.2; 2.2; 2.2; 2.2; 4.4; 2.2; 2.2; 2.2; 2.2; 2.2; 2.2; 2.2 INJECTION, SUSPENSION INTRAMUSCULAR at 08:25

## 2018-10-15 RX ADMIN — TRAZODONE HYDROCHLORIDE 50 MG: 50 TABLET ORAL at 21:04

## 2018-10-15 RX ADMIN — Medication 250 MG: at 08:25

## 2018-10-15 RX ADMIN — Medication 250 MG: at 16:22

## 2018-10-15 RX ADMIN — SODIUM CHLORIDE: 9 INJECTION, SOLUTION INTRAVENOUS at 02:31

## 2018-10-15 RX ADMIN — SODIUM CHLORIDE: 9 INJECTION, SOLUTION INTRAVENOUS at 20:38

## 2018-10-15 RX ADMIN — SODIUM CHLORIDE: 9 INJECTION, SOLUTION INTRAVENOUS at 11:22

## 2018-10-15 RX ADMIN — Medication 250 MG: at 13:37

## 2018-10-15 RX ADMIN — METRONIDAZOLE 500 MG: 500 INJECTION, SOLUTION INTRAVENOUS at 04:56

## 2018-10-15 RX ADMIN — METRONIDAZOLE 500 MG: 500 INJECTION, SOLUTION INTRAVENOUS at 21:04

## 2018-10-15 ASSESSMENT — PAIN SCALES - GENERAL
PAINLEVEL_OUTOF10: 0
PAINLEVEL_OUTOF10: 0

## 2018-10-15 NOTE — PROGRESS NOTES
GI Progress notes    10/15/2018   1:12 PM    Name:  Oz Domínguez  MRN:    [id number]     Acct:     [de-identified]   Room:  2012/2012-02  IP Day: 2     Admit Date: 10/13/2018  2:48 PM  PCP: Vish Arzate    Subjective:     C/C:   Chief Complaint   Patient presents with    Diarrhea     past 6wks (has been tx by PCP / recently had stool / blood work done       Interval History: Status: improved. She was found to be Positive for C diff  Was started on PO Vanco then  She is doing much better now  Has on sig diarrhea  Has no bleeding  Has no sig abd pains  Has no nausea or any vomiting  Mild abd bloating  Tolerating full liquids    ROS:  Constitutional: negative for chills, fevers and sweats    Gastrointestinal: negative for abdominal pain, constipation, + diarrhea, nausea and vomiting      Medications: Allergies: No Known Allergies    Current Meds:   vancomycin (FIRVANQ) 50 MG/ML oral solution 250 mg 4x Daily   acetaminophen (TYLENOL) tablet 325 mg Q4H PRN   ondansetron (ZOFRAN-ODT) disintegrating tablet 4 mg Q6H PRN   Or    ondansetron (ZOFRAN) injection 4 mg Q6H PRN   sodium chloride flush 0.9 % injection 10 mL PRN   traZODone (DESYREL) tablet 50 mg Nightly   0.9 % sodium chloride infusion Continuous   metronidazole (FLAGYL) 500 mg in NaCl 100 mL IVPB premix Q8H   gabapentin (NEURONTIN) capsule 600 mg TID       Data:     Code Status:  Full Code    No family history on file. Social History     Social History    Marital status:      Spouse name: N/A    Number of children: N/A    Years of education: N/A     Occupational History    Not on file.      Social History Main Topics    Smoking status: Never Smoker    Smokeless tobacco: Not on file    Alcohol use No    Drug use: No    Sexual activity: Not on file     Other Topics Concern    Not on file     Social History Narrative    No narrative on file       Vitals:  BP (!) 119/59   Pulse 73   Temp 98.6 °F (37 °C) (Oral)   Resp 16   Ht 5' 1\"

## 2018-10-15 NOTE — DISCHARGE SUMMARY
Discharge Summary    Patient ID: Quita Espino    MRN: [id number]     Acct:  [de-identified]       Patient's PCP: Tracie Elaine    Admit Date: 10/13/2018     Discharge Date:  10/16/2018 (if patient remains stable/tolerating diet. Admitting Physician: Pat Mireles MD    Discharge Physician: Anibal Marroquin MD     Discharge Diagnoses:  C. Difficile enteritis  Diarrhea  Left ovarian cyst    Primary Problem  Colitis    Active Hospital Problems    Diagnosis Date Noted    C. difficile enteritis [A04.72]     Colitis [K52.9] 10/13/2018    Proctitis [K62.89] 10/13/2018    Diarrhea of presumed infectious origin [R19.7] 10/13/2018    Left ovarian cyst [N83.202] 10/13/2018     Past Medical History:   Diagnosis Date    Acid reflux     Shingles      The patient was seen and examined on day of discharge and this discharge summary is in conjunction with any daily progress note from day of discharge. Code Status:  Full Code    Hospital Course: uneventful. Consults:  GI    Significant Diagnostic Studies: see EHR. Treatments: Medical.    Disposition: home    Discharged Condition: Stable    Follow Up:  Tracie Elaine in one week. Recommend pelvic ultrasound as out-patient to be arranged by PCP (re: ovarian cyst). Recommend follow-up C. Dif study after course of vancomycin. Follow-up with Dr. Renan Rendon (GI Service)    Discharge Medications:    Marzena Vivas, [address] [facility]. Medication Instructions REJI:[id number]    Printed on:10/15/18 4161   Medication Information                      aspirin 81 MG tablet  Take 81 mg by mouth daily             calcium carbonate 600 MG TABS tablet  Take 1 tablet by mouth daily W/ 500mg Vitamin D3             gabapentin (NEURONTIN) 600 MG tablet  Take 600 mg by mouth 3 times daily. .             Lactobacillus (PROBIOTIC ACIDOPHILUS) TABS  Take 1 tablet by mouth daily Further refills by PCP.              omeprazole (PRILOSEC) 20 MG delayed release capsule  Take 40 mg by mouth daily

## 2018-10-16 VITALS
BODY MASS INDEX: 25.77 KG/M2 | OXYGEN SATURATION: 99 % | TEMPERATURE: 98.6 F | HEIGHT: 61 IN | SYSTOLIC BLOOD PRESSURE: 117 MMHG | HEART RATE: 77 BPM | WEIGHT: 136.5 LBS | RESPIRATION RATE: 20 BRPM | DIASTOLIC BLOOD PRESSURE: 74 MMHG

## 2018-10-16 PROCEDURE — 2500000003 HC RX 250 WO HCPCS: Performed by: INTERNAL MEDICINE

## 2018-10-16 PROCEDURE — 6370000000 HC RX 637 (ALT 250 FOR IP): Performed by: INTERNAL MEDICINE

## 2018-10-16 PROCEDURE — 2580000003 HC RX 258: Performed by: INTERNAL MEDICINE

## 2018-10-16 RX ADMIN — METRONIDAZOLE 500 MG: 500 INJECTION, SOLUTION INTRAVENOUS at 05:43

## 2018-10-16 RX ADMIN — Medication 250 MG: at 14:05

## 2018-10-16 RX ADMIN — SODIUM CHLORIDE: 9 INJECTION, SOLUTION INTRAVENOUS at 05:43

## 2018-10-16 RX ADMIN — Medication 250 MG: at 08:23

## 2018-10-16 RX ADMIN — GABAPENTIN 600 MG: 300 CAPSULE ORAL at 14:04

## 2018-10-16 RX ADMIN — GABAPENTIN 600 MG: 300 CAPSULE ORAL at 08:23

## 2018-10-16 NOTE — CARE COORDINATION
Call back from Radha at [facility] and cost for liquid Vanco is more than at [facility]. Spoke to Marjorie Trimble at [facility] and he is checking cost of pill form. Will inform pt and dtr when cost determined.     Pt has new patient appointment with Dr. Maliha Goldman 10-26-18 at 11am.

## 2018-12-10 ENCOUNTER — APPOINTMENT (OUTPATIENT)
Dept: CT IMAGING | Age: 73
End: 2018-12-10
Payer: MEDICARE

## 2018-12-10 ENCOUNTER — HOSPITAL ENCOUNTER (OUTPATIENT)
Age: 73
Setting detail: OBSERVATION
Discharge: HOME | End: 2018-12-12
Attending: EMERGENCY MEDICINE | Admitting: INTERNAL MEDICINE
Payer: MEDICARE

## 2018-12-10 DIAGNOSIS — N17.9 ACUTE KIDNEY INJURY (HCC): Primary | ICD-10-CM

## 2018-12-10 DIAGNOSIS — K52.9 ENTEROCOLITIS: ICD-10-CM

## 2018-12-10 PROBLEM — E86.0 DEHYDRATION: Status: ACTIVE | Noted: 2018-12-10

## 2018-12-10 LAB
ABSOLUTE EOS #: 0.1 K/UL (ref 0–0.4)
ABSOLUTE IMMATURE GRANULOCYTE: ABNORMAL K/UL (ref 0–0.3)
ABSOLUTE LYMPH #: 0.8 K/UL (ref 1–4.8)
ABSOLUTE MONO #: 0.4 K/UL (ref 0.2–0.8)
ANION GAP SERPL CALCULATED.3IONS-SCNC: 16 MMOL/L (ref 9–17)
BASOPHILS # BLD: 0 % (ref 0–2)
BASOPHILS ABSOLUTE: 0 K/UL (ref 0–0.2)
BUN BLDV-MCNC: 25 MG/DL (ref 8–23)
BUN/CREAT BLD: 22 (ref 9–20)
C DIFFICILE TOXINS, PCR: ABNORMAL
CALCIUM SERPL-MCNC: 8.9 MG/DL (ref 8.6–10.4)
CAMPYLOBACTER PCR: NORMAL
CHLORIDE BLD-SCNC: 98 MMOL/L (ref 98–107)
CO2: 22 MMOL/L (ref 20–31)
CREAT SERPL-MCNC: 1.16 MG/DL (ref 0.5–0.9)
DIFFERENTIAL TYPE: ABNORMAL
EOSINOPHILS RELATIVE PERCENT: 1 % (ref 1–4)
GFR AFRICAN AMERICAN: 55 ML/MIN
GFR NON-AFRICAN AMERICAN: 46 ML/MIN
GFR SERPL CREATININE-BSD FRML MDRD: ABNORMAL ML/MIN/{1.73_M2}
GFR SERPL CREATININE-BSD FRML MDRD: ABNORMAL ML/MIN/{1.73_M2}
GLUCOSE BLD-MCNC: 92 MG/DL (ref 70–99)
HCT VFR BLD CALC: 38.6 % (ref 36–46)
HEMOGLOBIN: 13.5 G/DL (ref 12–16)
IMMATURE GRANULOCYTES: ABNORMAL %
LYMPHOCYTES # BLD: 17 % (ref 24–44)
MCH RBC QN AUTO: 31.3 PG (ref 26–34)
MCHC RBC AUTO-ENTMCNC: 35 G/DL (ref 31–37)
MCV RBC AUTO: 89.4 FL (ref 80–100)
MONOCYTES # BLD: 8 % (ref 1–7)
NRBC AUTOMATED: ABNORMAL PER 100 WBC
PDW BLD-RTO: 13.5 % (ref 11.5–14.5)
PLATELET # BLD: 160 K/UL (ref 130–400)
PLATELET ESTIMATE: ABNORMAL
PMV BLD AUTO: 8.8 FL (ref 6–12)
POTASSIUM SERPL-SCNC: 3.3 MMOL/L (ref 3.7–5.3)
POTASSIUM SERPL-SCNC: 3.7 MMOL/L (ref 3.7–5.3)
RBC # BLD: 4.32 M/UL (ref 4–5.2)
RBC # BLD: ABNORMAL 10*6/UL
SALMONELLA PCR: NORMAL
SEG NEUTROPHILS: 74 % (ref 36–66)
SEGMENTED NEUTROPHILS ABSOLUTE COUNT: 3.5 K/UL (ref 1.8–7.7)
SHIGATOXIN GENE PCR: NORMAL
SHIGELLA SP PCR: NORMAL
SODIUM BLD-SCNC: 136 MMOL/L (ref 135–144)
SPECIMEN DESCRIPTION: ABNORMAL
SPECIMEN: NORMAL
WBC # BLD: 4.8 K/UL (ref 3.5–11)
WBC # BLD: ABNORMAL 10*3/UL

## 2018-12-10 PROCEDURE — 96361 HYDRATE IV INFUSION ADD-ON: CPT

## 2018-12-10 PROCEDURE — 2580000003 HC RX 258: Performed by: EMERGENCY MEDICINE

## 2018-12-10 PROCEDURE — 96372 THER/PROPH/DIAG INJ SC/IM: CPT

## 2018-12-10 PROCEDURE — 99222 1ST HOSP IP/OBS MODERATE 55: CPT | Performed by: INTERNAL MEDICINE

## 2018-12-10 PROCEDURE — G0378 HOSPITAL OBSERVATION PER HR: HCPCS

## 2018-12-10 PROCEDURE — 84132 ASSAY OF SERUM POTASSIUM: CPT

## 2018-12-10 PROCEDURE — 99285 EMERGENCY DEPT VISIT HI MDM: CPT

## 2018-12-10 PROCEDURE — 74176 CT ABD & PELVIS W/O CONTRAST: CPT

## 2018-12-10 PROCEDURE — 85025 COMPLETE CBC W/AUTO DIFF WBC: CPT

## 2018-12-10 PROCEDURE — 96366 THER/PROPH/DIAG IV INF ADDON: CPT

## 2018-12-10 PROCEDURE — 87493 C DIFF AMPLIFIED PROBE: CPT

## 2018-12-10 PROCEDURE — 96365 THER/PROPH/DIAG IV INF INIT: CPT

## 2018-12-10 PROCEDURE — 6360000002 HC RX W HCPCS: Performed by: INTERNAL MEDICINE

## 2018-12-10 PROCEDURE — 6360000002 HC RX W HCPCS: Performed by: EMERGENCY MEDICINE

## 2018-12-10 PROCEDURE — 2580000003 HC RX 258: Performed by: INTERNAL MEDICINE

## 2018-12-10 PROCEDURE — 87505 NFCT AGENT DETECTION GI: CPT

## 2018-12-10 PROCEDURE — 6370000000 HC RX 637 (ALT 250 FOR IP): Performed by: INTERNAL MEDICINE

## 2018-12-10 PROCEDURE — 80048 BASIC METABOLIC PNL TOTAL CA: CPT

## 2018-12-10 RX ORDER — TRAMADOL HYDROCHLORIDE 50 MG/1
50 TABLET ORAL 2 TIMES DAILY PRN
COMMUNITY
End: 2019-06-07 | Stop reason: SDUPTHER

## 2018-12-10 RX ORDER — LORAZEPAM 0.5 MG/1
0.5 TABLET ORAL NIGHTLY PRN
Status: DISCONTINUED | OUTPATIENT
Start: 2018-12-10 | End: 2018-12-12 | Stop reason: HOSPADM

## 2018-12-10 RX ORDER — ASPIRIN 81 MG/1
81 TABLET ORAL DAILY
Status: DISCONTINUED | OUTPATIENT
Start: 2018-12-10 | End: 2018-12-12 | Stop reason: HOSPADM

## 2018-12-10 RX ORDER — ZOLPIDEM TARTRATE 12.5 MG/1
12.5 TABLET, FILM COATED, EXTENDED RELEASE ORAL NIGHTLY PRN
COMMUNITY
End: 2019-03-07 | Stop reason: SDUPTHER

## 2018-12-10 RX ORDER — GABAPENTIN 300 MG/1
600 CAPSULE ORAL 3 TIMES DAILY
Status: DISCONTINUED | OUTPATIENT
Start: 2018-12-10 | End: 2018-12-12 | Stop reason: HOSPADM

## 2018-12-10 RX ORDER — TRAZODONE HYDROCHLORIDE 50 MG/1
50 TABLET ORAL NIGHTLY
Status: DISCONTINUED | OUTPATIENT
Start: 2018-12-10 | End: 2018-12-12 | Stop reason: HOSPADM

## 2018-12-10 RX ORDER — METRONIDAZOLE 500 MG/1
500 TABLET ORAL EVERY 8 HOURS SCHEDULED
Status: DISCONTINUED | OUTPATIENT
Start: 2018-12-11 | End: 2018-12-11

## 2018-12-10 RX ORDER — TRAMADOL HYDROCHLORIDE 50 MG/1
50 TABLET ORAL EVERY 6 HOURS PRN
Status: DISCONTINUED | OUTPATIENT
Start: 2018-12-10 | End: 2018-12-12 | Stop reason: HOSPADM

## 2018-12-10 RX ORDER — 0.9 % SODIUM CHLORIDE 0.9 %
1000 INTRAVENOUS SOLUTION INTRAVENOUS ONCE
Status: DISCONTINUED | OUTPATIENT
Start: 2018-12-10 | End: 2018-12-10

## 2018-12-10 RX ORDER — SODIUM CHLORIDE 9 MG/ML
INJECTION, SOLUTION INTRAVENOUS CONTINUOUS
Status: DISCONTINUED | OUTPATIENT
Start: 2018-12-10 | End: 2018-12-12 | Stop reason: HOSPADM

## 2018-12-10 RX ORDER — SODIUM CHLORIDE 0.9 % (FLUSH) 0.9 %
10 SYRINGE (ML) INJECTION PRN
Status: DISCONTINUED | OUTPATIENT
Start: 2018-12-10 | End: 2018-12-12 | Stop reason: HOSPADM

## 2018-12-10 RX ORDER — TRAMADOL HYDROCHLORIDE 50 MG/1
50 TABLET ORAL 2 TIMES DAILY PRN
COMMUNITY
End: 2018-12-10 | Stop reason: SDUPTHER

## 2018-12-10 RX ORDER — ONDANSETRON 2 MG/ML
4 INJECTION INTRAMUSCULAR; INTRAVENOUS EVERY 6 HOURS PRN
Status: DISCONTINUED | OUTPATIENT
Start: 2018-12-10 | End: 2018-12-10

## 2018-12-10 RX ORDER — ONDANSETRON 4 MG/1
4 TABLET, ORALLY DISINTEGRATING ORAL EVERY 6 HOURS PRN
Status: DISCONTINUED | OUTPATIENT
Start: 2018-12-10 | End: 2018-12-12 | Stop reason: HOSPADM

## 2018-12-10 RX ORDER — 0.9 % SODIUM CHLORIDE 0.9 %
1000 INTRAVENOUS SOLUTION INTRAVENOUS ONCE
Status: COMPLETED | OUTPATIENT
Start: 2018-12-10 | End: 2018-12-10

## 2018-12-10 RX ORDER — PANTOPRAZOLE SODIUM 40 MG/1
40 TABLET, DELAYED RELEASE ORAL
Status: DISCONTINUED | OUTPATIENT
Start: 2018-12-10 | End: 2018-12-12 | Stop reason: HOSPADM

## 2018-12-10 RX ORDER — POTASSIUM CHLORIDE 7.45 MG/ML
10 INJECTION INTRAVENOUS ONCE
Status: COMPLETED | OUTPATIENT
Start: 2018-12-10 | End: 2018-12-10

## 2018-12-10 RX ORDER — SODIUM CHLORIDE 0.9 % (FLUSH) 0.9 %
10 SYRINGE (ML) INJECTION EVERY 12 HOURS SCHEDULED
Status: DISCONTINUED | OUTPATIENT
Start: 2018-12-10 | End: 2018-12-12 | Stop reason: HOSPADM

## 2018-12-10 RX ORDER — ONDANSETRON 2 MG/ML
4 INJECTION INTRAMUSCULAR; INTRAVENOUS EVERY 6 HOURS PRN
Status: DISCONTINUED | OUTPATIENT
Start: 2018-12-10 | End: 2018-12-12 | Stop reason: HOSPADM

## 2018-12-10 RX ADMIN — TRAZODONE HYDROCHLORIDE 50 MG: 50 TABLET ORAL at 21:20

## 2018-12-10 RX ADMIN — PROBIOTIC PRODUCT - TAB 1 TABLET: TAB at 13:37

## 2018-12-10 RX ADMIN — GABAPENTIN 600 MG: 300 CAPSULE ORAL at 13:38

## 2018-12-10 RX ADMIN — SODIUM CHLORIDE 1000 ML: 9 INJECTION, SOLUTION INTRAVENOUS at 12:18

## 2018-12-10 RX ADMIN — TRAMADOL HYDROCHLORIDE 50 MG: 50 TABLET, FILM COATED ORAL at 18:06

## 2018-12-10 RX ADMIN — SODIUM CHLORIDE: 9 INJECTION, SOLUTION INTRAVENOUS at 13:40

## 2018-12-10 RX ADMIN — SODIUM CHLORIDE: 9 INJECTION, SOLUTION INTRAVENOUS at 20:45

## 2018-12-10 RX ADMIN — ENOXAPARIN SODIUM 40 MG: 40 INJECTION SUBCUTANEOUS at 13:40

## 2018-12-10 RX ADMIN — POTASSIUM CHLORIDE 10 MEQ: 7.46 INJECTION, SOLUTION INTRAVENOUS at 08:30

## 2018-12-10 RX ADMIN — ASPIRIN 81 MG: 81 TABLET, COATED ORAL at 13:38

## 2018-12-10 RX ADMIN — SODIUM CHLORIDE 1000 ML: 9 INJECTION, SOLUTION INTRAVENOUS at 07:39

## 2018-12-10 RX ADMIN — GABAPENTIN 600 MG: 300 CAPSULE ORAL at 21:20

## 2018-12-10 ASSESSMENT — PAIN DESCRIPTION - LOCATION
LOCATION: ABDOMEN
LOCATION: ABDOMEN

## 2018-12-10 ASSESSMENT — PAIN SCALES - GENERAL
PAINLEVEL_OUTOF10: 5
PAINLEVEL_OUTOF10: 0
PAINLEVEL_OUTOF10: 6

## 2018-12-10 ASSESSMENT — PAIN DESCRIPTION - PAIN TYPE
TYPE: ACUTE PAIN
TYPE: ACUTE PAIN

## 2018-12-10 ASSESSMENT — PAIN DESCRIPTION - ORIENTATION: ORIENTATION: MID;LOWER

## 2018-12-10 NOTE — H&P
tiZANidine (ZANAFLEX) 2 MG tablet Take 2 mg by mouth 2 times daily as needed    Yes Historical Provider, MD   calcium carbonate 600 MG TABS tablet Take 1 tablet by mouth 2 times daily W/ 500mg Vitamin D3    Yes Historical Provider, MD   traZODone (DESYREL) 50 MG tablet Take 50 mg by mouth nightly   Yes Historical Provider, MD   aspirin 81 MG tablet Take 81 mg by mouth daily   Yes Historical Provider, MD      Allergies:     Patient has no known allergies. Social History:     Tobacco:    reports that she has never smoked. She has never used smokeless tobacco.  Alcohol:      reports that she does not drink alcohol. Drug Use:  reports that she does not use drugs. Family History:     History reviewed. No pertinent family history. Review of Systems:     Positive and Negative as described in HPI. CONSTITUTIONAL:  negative for fevers, chills, sweats, fatigue, weight loss  HEENT:  negative for vision, hearing changes, runny nose, throat pain  RESPIRATORY:  negative for shortness of breath, cough, congestion, wheezing. CARDIOVASCULAR:  negative for chest pain, palpitations.   GASTROINTESTINAL:  negative for nausea, vomiting, has multiple episodes of diarrhea  GENITOURINARY:  negative for difficulty of urination, burning with urination, frequency   INTEGUMENT:  negative for rash, skin lesions, easy bruising   HEMATOLOGIC/LYMPHATIC:  negative for swelling/edema   ALLERGIC/IMMUNOLOGIC:  negative for urticaria , itching  ENDOCRINE:  negative increase in drinking, increase in urination, hot or cold intolerance  MUSCULOSKELETAL:  negative joint pains, muscle aches, swelling of joints  NEUROLOGICAL:  negative for headaches, dizziness, lightheadedness, numbness, pain, tingling extremities  BEHAVIOR/PSYCH:  negative for depression, anxiety    Physical Exam:   BP (!) 121/54   Pulse 65   Temp 98 °F (36.7 °C) (Oral)   Resp 16   Ht 5' 1\" (1.549 m)   Wt 136 lb (61.7 kg)   SpO2 98%   BMI 25.70 kg/m²   Temp (24hrs),

## 2018-12-10 NOTE — ED PROVIDER NOTES
by mouth every 6 hours as needed    TRAMADOL (ULTRAM) 50 MG TABLET    Take 50 mg by mouth every 8 hours as needed for Pain. .    TRAZODONE (DESYREL) 50 MG TABLET    Take 50 mg by mouth nightly    ZOLPIDEM (AMBIEN CR) 12.5 MG EXTENDED RELEASE TABLET    Take 12.5 mg by mouth nightly as needed for Sleep. .       ALLERGIES     Patient has no known allergies. FAMILY HISTORY     History reviewed. No pertinent family history. SOCIAL HISTORY       Social History     Social History    Marital status:      Spouse name: N/A    Number of children: N/A    Years of education: N/A     Social History Main Topics    Smoking status: Never Smoker    Smokeless tobacco: Never Used    Alcohol use No    Drug use: No    Sexual activity: Not Asked     Other Topics Concern    None     Social History Narrative    None       SCREENINGS             PHYSICAL EXAM    (up to 7 for level 4, 8 or more for level 5)     ED Triage Vitals [12/10/18 0641]   BP Temp Temp src Pulse Resp SpO2 Height Weight   105/65 97.6 °F (36.4 °C) -- 104 16 99 % 5' 1\" (1.549 m) 136 lb (61.7 kg)       Physical Exam   Constitutional: She is oriented to person, place, and time. She appears well-developed and well-nourished. No distress. HENT:   Head: Normocephalic. Right Ear: External ear normal.   Left Ear: External ear normal.   Nose: Nose normal.   Mouth/Throat: Oropharynx is clear and moist.   Eyes: Pupils are equal, round, and reactive to light. EOM are normal.   Neck: Neck supple. Cardiovascular: Regular rhythm and normal heart sounds. Tachycardia present. No murmur heard. Pulmonary/Chest: Effort normal and breath sounds normal. She has no rhonchi. She has no rales. Abdominal: Soft. Normal appearance and bowel sounds are normal. She exhibits no distension. There is no hepatosplenomegaly. There is no tenderness. Musculoskeletal: Normal range of motion. Neurological: She is alert and oriented to person, place, and time.  No cranial

## 2018-12-10 NOTE — ED NOTES
Called to give report nurse not available will call me back.      Krystyna Velazquez, MATTHEW  12/10/18 8510

## 2018-12-11 LAB
ANION GAP SERPL CALCULATED.3IONS-SCNC: 14 MMOL/L (ref 9–17)
BUN BLDV-MCNC: 15 MG/DL (ref 8–23)
BUN/CREAT BLD: 18 (ref 9–20)
CALCIUM SERPL-MCNC: 7.6 MG/DL (ref 8.6–10.4)
CHLORIDE BLD-SCNC: 105 MMOL/L (ref 98–107)
CO2: 18 MMOL/L (ref 20–31)
CREAT SERPL-MCNC: 0.84 MG/DL (ref 0.5–0.9)
GFR AFRICAN AMERICAN: >60 ML/MIN
GFR NON-AFRICAN AMERICAN: >60 ML/MIN
GFR SERPL CREATININE-BSD FRML MDRD: ABNORMAL ML/MIN/{1.73_M2}
GFR SERPL CREATININE-BSD FRML MDRD: ABNORMAL ML/MIN/{1.73_M2}
GLUCOSE BLD-MCNC: 96 MG/DL (ref 70–99)
HCT VFR BLD CALC: 36.3 % (ref 36–46)
HEMOGLOBIN: 12.2 G/DL (ref 12–16)
MCH RBC QN AUTO: 30.5 PG (ref 26–34)
MCHC RBC AUTO-ENTMCNC: 33.5 G/DL (ref 31–37)
MCV RBC AUTO: 90.9 FL (ref 80–100)
NRBC AUTOMATED: ABNORMAL PER 100 WBC
PDW BLD-RTO: 13.8 % (ref 11.5–14.5)
PLATELET # BLD: 171 K/UL (ref 130–400)
PMV BLD AUTO: 9 FL (ref 6–12)
POTASSIUM SERPL-SCNC: 3.8 MMOL/L (ref 3.7–5.3)
RBC # BLD: 3.99 M/UL (ref 4–5.2)
SODIUM BLD-SCNC: 137 MMOL/L (ref 135–144)
WBC # BLD: 5.1 K/UL (ref 3.5–11)

## 2018-12-11 PROCEDURE — 2580000003 HC RX 258: Performed by: INTERNAL MEDICINE

## 2018-12-11 PROCEDURE — 36415 COLL VENOUS BLD VENIPUNCTURE: CPT

## 2018-12-11 PROCEDURE — G0378 HOSPITAL OBSERVATION PER HR: HCPCS

## 2018-12-11 PROCEDURE — 6370000000 HC RX 637 (ALT 250 FOR IP): Performed by: INTERNAL MEDICINE

## 2018-12-11 PROCEDURE — 6370000000 HC RX 637 (ALT 250 FOR IP): Performed by: NURSE PRACTITIONER

## 2018-12-11 PROCEDURE — 99232 SBSQ HOSP IP/OBS MODERATE 35: CPT | Performed by: INTERNAL MEDICINE

## 2018-12-11 PROCEDURE — 80048 BASIC METABOLIC PNL TOTAL CA: CPT

## 2018-12-11 PROCEDURE — 6360000002 HC RX W HCPCS: Performed by: INTERNAL MEDICINE

## 2018-12-11 PROCEDURE — 96372 THER/PROPH/DIAG INJ SC/IM: CPT

## 2018-12-11 PROCEDURE — 85027 COMPLETE CBC AUTOMATED: CPT

## 2018-12-11 RX ORDER — VANCOMYCIN HYDROCHLORIDE 125 MG/1
CAPSULE ORAL
Qty: 95 CAPSULE | Refills: 0 | Status: SHIPPED | OUTPATIENT
Start: 2018-12-11 | End: 2019-06-07 | Stop reason: ALTCHOICE

## 2018-12-11 RX ADMIN — GABAPENTIN 600 MG: 300 CAPSULE ORAL at 13:52

## 2018-12-11 RX ADMIN — GABAPENTIN 600 MG: 300 CAPSULE ORAL at 09:48

## 2018-12-11 RX ADMIN — TRAZODONE HYDROCHLORIDE 50 MG: 50 TABLET ORAL at 21:30

## 2018-12-11 RX ADMIN — PROBIOTIC PRODUCT - TAB 1 TABLET: TAB at 21:30

## 2018-12-11 RX ADMIN — PROBIOTIC PRODUCT - TAB 1 TABLET: TAB at 13:52

## 2018-12-11 RX ADMIN — SODIUM CHLORIDE: 9 INJECTION, SOLUTION INTRAVENOUS at 09:49

## 2018-12-11 RX ADMIN — METRONIDAZOLE 500 MG: 500 TABLET ORAL at 06:05

## 2018-12-11 RX ADMIN — SODIUM CHLORIDE: 9 INJECTION, SOLUTION INTRAVENOUS at 21:32

## 2018-12-11 RX ADMIN — ENOXAPARIN SODIUM 40 MG: 40 INJECTION SUBCUTANEOUS at 09:50

## 2018-12-11 RX ADMIN — Medication 125 MG: at 23:41

## 2018-12-11 RX ADMIN — PROBIOTIC PRODUCT - TAB 1 TABLET: TAB at 09:48

## 2018-12-11 RX ADMIN — METRONIDAZOLE 500 MG: 500 TABLET ORAL at 00:03

## 2018-12-11 RX ADMIN — Medication 125 MG: at 09:49

## 2018-12-11 RX ADMIN — GABAPENTIN 600 MG: 300 CAPSULE ORAL at 21:31

## 2018-12-11 RX ADMIN — ASPIRIN 81 MG: 81 TABLET, COATED ORAL at 09:48

## 2018-12-11 RX ADMIN — Medication 125 MG: at 13:53

## 2018-12-11 RX ADMIN — Medication 125 MG: at 16:51

## 2018-12-11 RX ADMIN — PANTOPRAZOLE SODIUM 40 MG: 40 TABLET, DELAYED RELEASE ORAL at 06:05

## 2018-12-11 ASSESSMENT — PAIN SCALES - GENERAL
PAINLEVEL_OUTOF10: 0

## 2018-12-11 NOTE — CARE COORDINATION
Discharge Planning    Script for Vancomycin obtained and taken to [facility] and awaiting directions for obtaining authorization.

## 2018-12-12 VITALS
HEIGHT: 61 IN | DIASTOLIC BLOOD PRESSURE: 55 MMHG | TEMPERATURE: 98.3 F | WEIGHT: 127.87 LBS | SYSTOLIC BLOOD PRESSURE: 133 MMHG | RESPIRATION RATE: 18 BRPM | HEART RATE: 68 BPM | OXYGEN SATURATION: 98 % | BODY MASS INDEX: 24.14 KG/M2

## 2018-12-12 PROBLEM — E86.0 DEHYDRATION: Status: RESOLVED | Noted: 2018-12-10 | Resolved: 2018-12-12

## 2018-12-12 PROCEDURE — 6360000002 HC RX W HCPCS: Performed by: INTERNAL MEDICINE

## 2018-12-12 PROCEDURE — 6370000000 HC RX 637 (ALT 250 FOR IP): Performed by: INTERNAL MEDICINE

## 2018-12-12 PROCEDURE — 2580000003 HC RX 258: Performed by: INTERNAL MEDICINE

## 2018-12-12 PROCEDURE — 99232 SBSQ HOSP IP/OBS MODERATE 35: CPT | Performed by: INTERNAL MEDICINE

## 2018-12-12 PROCEDURE — G0378 HOSPITAL OBSERVATION PER HR: HCPCS

## 2018-12-12 PROCEDURE — 96372 THER/PROPH/DIAG INJ SC/IM: CPT

## 2018-12-12 RX ADMIN — Medication 125 MG: at 05:56

## 2018-12-12 RX ADMIN — PROBIOTIC PRODUCT - TAB 1 TABLET: TAB at 14:53

## 2018-12-12 RX ADMIN — PROBIOTIC PRODUCT - TAB 1 TABLET: TAB at 09:28

## 2018-12-12 RX ADMIN — SODIUM CHLORIDE: 9 INJECTION, SOLUTION INTRAVENOUS at 09:35

## 2018-12-12 RX ADMIN — Medication 125 MG: at 13:02

## 2018-12-12 RX ADMIN — ASPIRIN 81 MG: 81 TABLET, COATED ORAL at 09:28

## 2018-12-12 RX ADMIN — GABAPENTIN 600 MG: 300 CAPSULE ORAL at 09:28

## 2018-12-12 RX ADMIN — ENOXAPARIN SODIUM 40 MG: 40 INJECTION SUBCUTANEOUS at 09:28

## 2018-12-12 RX ADMIN — GABAPENTIN 600 MG: 300 CAPSULE ORAL at 14:52

## 2018-12-12 RX ADMIN — PANTOPRAZOLE SODIUM 40 MG: 40 TABLET, DELAYED RELEASE ORAL at 05:55

## 2018-12-12 ASSESSMENT — PAIN SCALES - GENERAL
PAINLEVEL_OUTOF10: 0
PAINLEVEL_OUTOF10: 0

## 2018-12-12 NOTE — DISCHARGE INSTR - DIET
 General Diet    Good nutrition is important when healing from an illness, injury, or surgery. Follow any nutrition recommendations given to you during your hospital stay.  If you were given an oral nutrition supplement while in the hospital, continue to take this supplement at home. You can take it with meals, in-between meals, and/or before bedtime. These supplements can be purchased at most local grocery stores, pharmacies, and chain super-stores.  If you have any questions about your diet or nutrition, call the hospital and ask for the dietitian.

## 2018-12-12 NOTE — PROGRESS NOTES
CLINICAL PHARMACY NOTE: MEDS TO [address] Select Patient?: No  Total # of Prescriptions Filled: 1   The following medications were delivered to the patient:  · VANCOMYCIN 125 MG CAPS   Total # of Interventions Completed: 1  Time Spent (min): 45    Additional Documentation: WE OWE 34, ORDERED FOR TOMORROW, SHE WILL 
Emergency Department Pharmacist Note    Patient presents to the ED with complaints of   Chief Complaint   Patient presents with    Diarrhea     started yesterday around 0400    Abdominal Pain     started this morning   . Patient was seen by the ED pharmacist and offered counseling. At this time the patient has no medication related problems or concerns. There is no need for pharmacist intervention.     Osei Chappell PharmD, [facility]  12/10/2018 12:13 PM
Nutrition Assessment    Type and Reason for Visit: Initial, Consult (Diarrhea)    Nutrition Recommendations: 1. Suggest continuing on general diet. Monitor need for changing to low fiber consistency. 2. Consider ordering Ensure Enlive ONS, x 2/day. Nutrition Assessment: Pt with other Staff Member. Evaluation to be @ risk for malnutrition, with dx of enterocolitis (severe), and +diarrhea. Also, noted +wt loss of 3.9 kg (6.3%) since 10/13 (61.9 kg) since 10/13 (61.9 kg). This is significant over x 2 month time frame. Monitor need to start on low fiber diet, and would ad an ONS. Malnutrition Assessment:  · Malnutrition Status: At risk for malnutrition  · Findings of the 6 clinical characteristics of malnutrition (Minimum of 2 out of 6 clinical characteristics is required to make the diagnosis of moderate or severe Protein Calorie Malnutrition based on AND/ASPEN Guidelines):  1. Energy Intake- not able to assess    2. Weight Loss-5% loss or greater (6.3%),  (in 2 months)  3. Fat Loss-Unable to assess  4. Muscle Loss-Unable to assess  5. Fluid Accumulation-No significant fluid accumulation  6.   Strength-Not measured    Nutrition Risk Level: High    Nutrient Needs:  · Estimated Daily Total Kcal: 1,450-1,600 kcal (25-27 g/kg)  · Estimated Daily Protein (g): 65-75 g (1.1-1.25 g/kg)  · Estimated Daily Total Fluid (ml/day): 2,000-2,100 ml (34-35 ml/kg)    Nutrition Diagnosis:   · Problem: Inadequate oral intake  · Etiology: related to Alteration in GI function     Signs and symptoms:  as evidenced by Intake 50-75%, Weight loss:  +3.9 kg--6.3% since 10/13,  Other 12/10 CT Abd/Pelvis:  results, Diarrhea    Objective Information:  · Nutrition-Focused Physical Findings: GI:  soft, tenderness, diarrhea, hyperactive bowel sounds; PV/Skin:  WDL  · Wound Type: None  · Current Nutrition Therapies:  · Oral Diet Orders: General   · Oral Diet intake: 51-75%  · Anthropometric Measures:  · Ht: 5' 1\" (154.9 cm)
antibiotics safely? Be safe with medicine. Take your antibiotics as directed. Do not stop taking them just because you feel better. You need to take the full course of medicine. This will help make sure your infection is cured. It will also help prevent the growth of antibiotic-resistant bacteria. Always take the exact amount that the label says to take. If the label says to take the medicine at a certain time, follow those directions. You might feel better after you take an antibiotic for a few days. But it is important to keep taking it for as long as prescribed. That will help you get rid of those bacteria that are a bit stronger and that survive the first few days of treatment. Where can you learn more? Go to https://[employer].go2 media. org and sign in to your Bridge account. Enter S513 in the SNAPP' box to learn more about \"Learning About the Safe Use of Antibiotics. \"     If you do not have an account, please click on the \"Sign Up Now\" link. Current as of: March 3, 2017  Content Version: 11.3  © 9618-4810 [employer]. Care instructions adapted under license by [facility] ([facility]). If you have questions about a medical condition or this instruction, always ask your healthcare professional. [place] [address] any warranty or liability for your use of this information. Antibiotics are powerful drugs that are generally safe and very helpful in fighting disease, but there are times when antibiotics can actually be harmful. Antibiotics can have side effects, including allergic reactions and a potentially deadly diarrhea caused by the bacteria Clostridium difficile (C. diff). Antibiotics can also interfere with the action of other drugs a patient may be taking for another condition. These unintended reactions to antibiotics are called adverse drug events.    When someone takes an antibiotic that they do not need, they are needlessly exposed to the side
pertinent family history. Vitals:  BP (!) 133/55   Pulse 68   Temp 98.3 °F (36.8 °C) (Oral)   Resp 18   Ht 5' 1\" (1.549 m)   Wt 127 lb 13.9 oz (58 kg)   SpO2 98%   BMI 24.16 kg/m²   Temp (24hrs), Av.4 °F (36.9 °C), Min:98.3 °F (36.8 °C), Max:98.6 °F (37 °C)    No results for input(s): POCGLU in the last 72 hours. I/O (24Hr): Intake/Output Summary (Last 24 hours) at 18 1334  Last data filed at 18 1126   Gross per 24 hour   Intake          2435.68 ml   Output             2075 ml   Net           360.68 ml     Labs:    Lab Results   Component Value Date/Time    SPECIAL NOT REPORTED 10/13/2018 10:00 PM     Physical Examination:        General appearance:  alert, cooperative and no distress  Mental Status:  oriented to person, place and time and normal affect  Lungs:  clear to auscultation bilaterally, normal effort  Heart:  regular rate and rhythm, no murmur  Abdomen:  soft, nontender, nondistended, normal bowel sounds, no masses, hepatomegaly, splenomegaly  Extremities:  no edema, redness, tenderness in the calves  Skin:  no gross lesions, rashes, induration    Assessment:        Primary Problem  Recurrent colitis due to Clostridium difficile    Active Hospital Problems    Diagnosis Date Noted    Dehydration [E86.0] 12/10/2018    Recurrent colitis due to Clostridium difficile [A04.71]     Left ovarian cyst [N83.202] 10/13/2018     Plan:        Principal Problem:    Recurrent colitis due to Clostridium difficile: On tapered oral vancomycin. Continued clinical progress. If feels improved by later today can be discharged home.  Discussed with patient and RN at bedside    Left ovarian cyst    Dehydration, improved with IV fluids      Jodee Butt MD  2018  1:34 PM

## 2019-03-07 ENCOUNTER — OFFICE VISIT (OUTPATIENT)
Dept: FAMILY MEDICINE CLINIC | Age: 74
End: 2019-03-07
Payer: COMMERCIAL

## 2019-03-07 VITALS
HEART RATE: 67 BPM | WEIGHT: 130 LBS | SYSTOLIC BLOOD PRESSURE: 116 MMHG | TEMPERATURE: 97.8 F | OXYGEN SATURATION: 96 % | DIASTOLIC BLOOD PRESSURE: 69 MMHG | BODY MASS INDEX: 24.56 KG/M2

## 2019-03-07 DIAGNOSIS — Z13.820 SCREENING FOR OSTEOPOROSIS: ICD-10-CM

## 2019-03-07 DIAGNOSIS — K21.9 GASTROESOPHAGEAL REFLUX DISEASE, ESOPHAGITIS PRESENCE NOT SPECIFIED: ICD-10-CM

## 2019-03-07 DIAGNOSIS — E87.6 LOW SERUM POTASSIUM: ICD-10-CM

## 2019-03-07 DIAGNOSIS — K52.9 ENTEROCOLITIS: ICD-10-CM

## 2019-03-07 DIAGNOSIS — Z76.89 ENCOUNTER TO ESTABLISH CARE: Primary | ICD-10-CM

## 2019-03-07 DIAGNOSIS — Z23 NEED FOR SHINGLES VACCINE: ICD-10-CM

## 2019-03-07 DIAGNOSIS — Z12.39 BREAST CANCER SCREENING: ICD-10-CM

## 2019-03-07 DIAGNOSIS — A04.71 RECURRENT COLITIS DUE TO CLOSTRIDIUM DIFFICILE: ICD-10-CM

## 2019-03-07 DIAGNOSIS — F51.01 PRIMARY INSOMNIA: ICD-10-CM

## 2019-03-07 DIAGNOSIS — Z13.6 ENCOUNTER FOR SCREENING FOR CARDIOVASCULAR DISORDERS: ICD-10-CM

## 2019-03-07 DIAGNOSIS — R79.89 LOW SERUM CALCIUM: ICD-10-CM

## 2019-03-07 DIAGNOSIS — Z78.0 ASYMPTOMATIC MENOPAUSAL STATE: ICD-10-CM

## 2019-03-07 DIAGNOSIS — B02.23 NEUROPATHY DUE TO HERPES ZOSTER: ICD-10-CM

## 2019-03-07 PROCEDURE — 99204 OFFICE O/P NEW MOD 45 MIN: CPT | Performed by: FAMILY MEDICINE

## 2019-03-07 RX ORDER — TRAZODONE HYDROCHLORIDE 50 MG/1
50 TABLET ORAL NIGHTLY
Qty: 30 TABLET | Refills: 1 | Status: SHIPPED | OUTPATIENT
Start: 2019-03-07 | End: 2019-06-07 | Stop reason: SDUPTHER

## 2019-03-07 RX ORDER — RANITIDINE 150 MG/1
150 TABLET ORAL 2 TIMES DAILY
Qty: 60 TABLET | Refills: 3 | Status: SHIPPED | OUTPATIENT
Start: 2019-03-07 | End: 2019-03-07 | Stop reason: SDUPTHER

## 2019-03-07 RX ORDER — TIZANIDINE 2 MG/1
2 TABLET ORAL EVERY 8 HOURS PRN
Qty: 60 TABLET | Refills: 1 | Status: SHIPPED | OUTPATIENT
Start: 2019-03-07 | End: 2019-06-07 | Stop reason: SDUPTHER

## 2019-03-07 RX ORDER — RANITIDINE 150 MG/1
150 TABLET ORAL 2 TIMES DAILY
Qty: 60 TABLET | Refills: 3 | Status: SHIPPED | OUTPATIENT
Start: 2019-03-07 | End: 2019-06-07 | Stop reason: SDUPTHER

## 2019-03-07 RX ORDER — ZOLPIDEM TARTRATE 12.5 MG/1
12.5 TABLET, FILM COATED, EXTENDED RELEASE ORAL NIGHTLY PRN
Qty: 30 TABLET | Refills: 0 | Status: SHIPPED | OUTPATIENT
Start: 2019-03-07 | End: 2019-04-06

## 2019-03-07 RX ORDER — GABAPENTIN 800 MG/1
800 TABLET ORAL 3 TIMES DAILY
COMMUNITY
End: 2019-03-07 | Stop reason: SDUPTHER

## 2019-03-07 RX ORDER — GABAPENTIN 800 MG/1
800 TABLET ORAL 3 TIMES DAILY
Qty: 90 TABLET | Refills: 0 | Status: SHIPPED | OUTPATIENT
Start: 2019-03-07 | End: 2019-06-07 | Stop reason: SDUPTHER

## 2019-03-07 ASSESSMENT — PATIENT HEALTH QUESTIONNAIRE - PHQ9
1. LITTLE INTEREST OR PLEASURE IN DOING THINGS: 0
SUM OF ALL RESPONSES TO PHQ9 QUESTIONS 1 & 2: 0
SUM OF ALL RESPONSES TO PHQ QUESTIONS 1-9: 0
SUM OF ALL RESPONSES TO PHQ QUESTIONS 1-9: 0
2. FEELING DOWN, DEPRESSED OR HOPELESS: 0

## 2019-03-19 ENCOUNTER — HOSPITAL ENCOUNTER (OUTPATIENT)
Age: 74
Setting detail: SPECIMEN
Discharge: HOME | End: 2019-03-19
Payer: COMMERCIAL

## 2019-03-19 ENCOUNTER — HOSPITAL ENCOUNTER (OUTPATIENT)
Age: 74
Discharge: HOME | End: 2019-03-19
Payer: COMMERCIAL

## 2019-03-19 DIAGNOSIS — K21.9 GASTROESOPHAGEAL REFLUX DISEASE, ESOPHAGITIS PRESENCE NOT SPECIFIED: ICD-10-CM

## 2019-03-19 DIAGNOSIS — Z13.6 ENCOUNTER FOR SCREENING FOR CARDIOVASCULAR DISORDERS: ICD-10-CM

## 2019-03-19 DIAGNOSIS — K52.9 ENTEROCOLITIS: ICD-10-CM

## 2019-03-19 DIAGNOSIS — A04.71 RECURRENT COLITIS DUE TO CLOSTRIDIUM DIFFICILE: ICD-10-CM

## 2019-03-19 DIAGNOSIS — E87.6 LOW SERUM POTASSIUM: ICD-10-CM

## 2019-03-19 DIAGNOSIS — B02.23 NEUROPATHY DUE TO HERPES ZOSTER: ICD-10-CM

## 2019-03-19 DIAGNOSIS — R79.89 LOW SERUM CALCIUM: ICD-10-CM

## 2019-03-19 LAB
ABSOLUTE EOS #: 0.14 K/UL (ref 0–0.44)
ABSOLUTE IMMATURE GRANULOCYTE: <0.03 K/UL (ref 0–0.3)
ABSOLUTE LYMPH #: 1.74 K/UL (ref 1.1–3.7)
ABSOLUTE MONO #: 0.3 K/UL (ref 0.1–1.2)
ALBUMIN SERPL-MCNC: 4.3 G/DL (ref 3.5–5.2)
ALBUMIN/GLOBULIN RATIO: 1.2 (ref 1–2.5)
ALP BLD-CCNC: 57 U/L (ref 35–104)
ALT SERPL-CCNC: 27 U/L (ref 5–33)
ANION GAP SERPL CALCULATED.3IONS-SCNC: 15 MMOL/L (ref 9–17)
AST SERPL-CCNC: 28 U/L
BASOPHILS # BLD: 1 % (ref 0–2)
BASOPHILS ABSOLUTE: 0.04 K/UL (ref 0–0.2)
BILIRUB SERPL-MCNC: 0.26 MG/DL (ref 0.3–1.2)
BILIRUBIN URINE: NEGATIVE
BUN BLDV-MCNC: 13 MG/DL (ref 8–23)
BUN/CREAT BLD: ABNORMAL (ref 9–20)
CALCIUM SERPL-MCNC: 10.1 MG/DL (ref 8.6–10.4)
CHLORIDE BLD-SCNC: 102 MMOL/L (ref 98–107)
CHOLESTEROL/HDL RATIO: 4.5
CHOLESTEROL: 190 MG/DL
CO2: 27 MMOL/L (ref 20–31)
COLOR: YELLOW
COMMENT UA: NORMAL
CREAT SERPL-MCNC: 0.93 MG/DL (ref 0.5–0.9)
DIFFERENTIAL TYPE: ABNORMAL
EOSINOPHILS RELATIVE PERCENT: 4 % (ref 1–4)
GFR AFRICAN AMERICAN: >60 ML/MIN
GFR NON-AFRICAN AMERICAN: 59 ML/MIN
GFR SERPL CREATININE-BSD FRML MDRD: ABNORMAL ML/MIN/{1.73_M2}
GFR SERPL CREATININE-BSD FRML MDRD: ABNORMAL ML/MIN/{1.73_M2}
GLUCOSE BLD-MCNC: 84 MG/DL (ref 70–99)
GLUCOSE URINE: NEGATIVE
HCT VFR BLD CALC: 40.5 % (ref 36.3–47.1)
HDLC SERPL-MCNC: 42 MG/DL
HEMOGLOBIN: 12.7 G/DL (ref 11.9–15.1)
IMMATURE GRANULOCYTES: 1 %
KETONES, URINE: NEGATIVE
LDL CHOLESTEROL: 116 MG/DL (ref 0–130)
LEUKOCYTE ESTERASE, URINE: NEGATIVE
LYMPHOCYTES # BLD: 43 % (ref 24–43)
MCH RBC QN AUTO: 30.2 PG (ref 25.2–33.5)
MCHC RBC AUTO-ENTMCNC: 31.4 G/DL (ref 28.4–34.8)
MCV RBC AUTO: 96.4 FL (ref 82.6–102.9)
MONOCYTES # BLD: 8 % (ref 3–12)
NITRITE, URINE: NEGATIVE
NRBC AUTOMATED: 0 PER 100 WBC
PDW BLD-RTO: 13.5 % (ref 11.8–14.4)
PH UA: 7 (ref 5–8)
PLATELET # BLD: 224 K/UL (ref 138–453)
PLATELET ESTIMATE: ABNORMAL
PMV BLD AUTO: 12.2 FL (ref 8.1–13.5)
POTASSIUM SERPL-SCNC: 5.2 MMOL/L (ref 3.7–5.3)
PROTEIN UA: NEGATIVE
RBC # BLD: 4.2 M/UL (ref 3.95–5.11)
RBC # BLD: ABNORMAL 10*6/UL
SEG NEUTROPHILS: 43 % (ref 36–65)
SEGMENTED NEUTROPHILS ABSOLUTE COUNT: 1.78 K/UL (ref 1.5–8.1)
SODIUM BLD-SCNC: 144 MMOL/L (ref 135–144)
SPECIFIC GRAVITY UA: 1.01 (ref 1–1.03)
TOTAL PROTEIN: 7.8 G/DL (ref 6.4–8.3)
TRIGL SERPL-MCNC: 161 MG/DL
TURBIDITY: CLEAR
URINE HGB: NEGATIVE
UROBILINOGEN, URINE: NORMAL
VLDLC SERPL CALC-MCNC: ABNORMAL MG/DL (ref 1–30)
WBC # BLD: 4 K/UL (ref 3.5–11.3)
WBC # BLD: ABNORMAL 10*3/UL

## 2019-03-19 PROCEDURE — 87449 NOS EACH ORGANISM AG IA: CPT

## 2019-03-19 PROCEDURE — 87324 CLOSTRIDIUM AG IA: CPT

## 2019-03-20 DIAGNOSIS — A04.71 RECURRENT COLITIS DUE TO CLOSTRIDIUM DIFFICILE: Primary | ICD-10-CM

## 2019-03-20 LAB
C DIFF AG + TOXIN: NORMAL
SPECIMEN DESCRIPTION: NORMAL

## 2019-03-20 RX ORDER — METRONIDAZOLE 500 MG/1
500 TABLET ORAL 2 TIMES DAILY
Qty: 28 TABLET | Refills: 0 | OUTPATIENT
Start: 2019-03-20 | End: 2019-04-03

## 2019-03-25 ENCOUNTER — TELEPHONE (OUTPATIENT)
Dept: FAMILY MEDICINE CLINIC | Age: 74
End: 2019-03-25

## 2019-03-25 DIAGNOSIS — K52.9 ENTEROCOLITIS: ICD-10-CM

## 2019-03-25 DIAGNOSIS — B02.23 NEUROPATHY DUE TO HERPES ZOSTER: Primary | ICD-10-CM

## 2019-03-25 NOTE — TELEPHONE ENCOUNTER
Pt is requesting Home care services thru whoever PCP's chooses. Call pts daughter when orders are in place.  Joselito Moore [phone]

## 2019-06-07 ENCOUNTER — OFFICE VISIT (OUTPATIENT)
Dept: FAMILY MEDICINE CLINIC | Age: 74
End: 2019-06-07
Payer: COMMERCIAL

## 2019-06-07 VITALS
SYSTOLIC BLOOD PRESSURE: 118 MMHG | DIASTOLIC BLOOD PRESSURE: 72 MMHG | WEIGHT: 133 LBS | OXYGEN SATURATION: 98 % | BODY MASS INDEX: 25.13 KG/M2 | HEART RATE: 83 BPM

## 2019-06-07 DIAGNOSIS — B02.23 NEUROPATHY DUE TO HERPES ZOSTER: Primary | ICD-10-CM

## 2019-06-07 DIAGNOSIS — A04.72 C. DIFFICILE ENTERITIS: ICD-10-CM

## 2019-06-07 DIAGNOSIS — Z23 NEED FOR SHINGLES VACCINE: ICD-10-CM

## 2019-06-07 DIAGNOSIS — A04.71 RECURRENT COLITIS DUE TO CLOSTRIDIUM DIFFICILE: ICD-10-CM

## 2019-06-07 DIAGNOSIS — K21.9 GASTROESOPHAGEAL REFLUX DISEASE, ESOPHAGITIS PRESENCE NOT SPECIFIED: ICD-10-CM

## 2019-06-07 DIAGNOSIS — K52.9 ENTEROCOLITIS: ICD-10-CM

## 2019-06-07 PROCEDURE — 99213 OFFICE O/P EST LOW 20 MIN: CPT | Performed by: FAMILY MEDICINE

## 2019-06-07 RX ORDER — TIZANIDINE 2 MG/1
2 TABLET ORAL EVERY 8 HOURS PRN
Qty: 60 TABLET | Refills: 1 | Status: SHIPPED | OUTPATIENT
Start: 2019-06-07 | End: 2019-06-07 | Stop reason: SDUPTHER

## 2019-06-07 RX ORDER — TIZANIDINE 2 MG/1
2 TABLET ORAL EVERY 8 HOURS PRN
Qty: 90 TABLET | Refills: 1 | Status: SHIPPED | OUTPATIENT
Start: 2019-06-07 | End: 2019-07-07

## 2019-06-07 RX ORDER — TRAZODONE HYDROCHLORIDE 50 MG/1
50 TABLET ORAL NIGHTLY
Qty: 30 TABLET | Refills: 1 | Status: SHIPPED | OUTPATIENT
Start: 2019-06-07 | End: 2019-10-07 | Stop reason: SDUPTHER

## 2019-06-07 RX ORDER — RANITIDINE 150 MG/1
150 TABLET ORAL 2 TIMES DAILY
Qty: 60 TABLET | Refills: 3 | Status: SHIPPED | OUTPATIENT
Start: 2019-06-07 | End: 2019-12-09 | Stop reason: SDUPTHER

## 2019-06-07 RX ORDER — MAG HYDROX/ALUMINUM HYD/SIMETH 400-400-40
SUSPENSION, ORAL (FINAL DOSE FORM) ORAL
Refills: 1 | COMMUNITY
Start: 2019-03-09 | End: 2019-06-07 | Stop reason: SDUPTHER

## 2019-06-07 RX ORDER — GREEN TEA/HOODIA GORDONII 315-12.5MG
1 CAPSULE ORAL DAILY
Qty: 20 TABLET | Refills: 0 | Status: SHIPPED | OUTPATIENT
Start: 2019-06-07 | End: 2019-07-10 | Stop reason: SDUPTHER

## 2019-06-07 RX ORDER — MAG HYDROX/ALUMINUM HYD/SIMETH 400-400-40
SUSPENSION, ORAL (FINAL DOSE FORM) ORAL
Qty: 60 CAPSULE | Refills: 1 | Status: SHIPPED | OUTPATIENT
Start: 2019-06-07 | End: 2019-08-08 | Stop reason: SDUPTHER

## 2019-06-07 RX ORDER — TRAMADOL HYDROCHLORIDE 50 MG/1
50 TABLET ORAL 2 TIMES DAILY PRN
Qty: 60 TABLET | Refills: 0 | Status: SHIPPED | OUTPATIENT
Start: 2019-06-07 | End: 2019-07-10 | Stop reason: SDUPTHER

## 2019-06-07 RX ORDER — GABAPENTIN 800 MG/1
800 TABLET ORAL 3 TIMES DAILY
Qty: 90 TABLET | Refills: 3 | Status: SHIPPED | OUTPATIENT
Start: 2019-06-07 | End: 2019-11-11 | Stop reason: SDUPTHER

## 2019-06-07 NOTE — PROGRESS NOTES
General FM note    Marcellus Lynn is a 75 y.o. female who presents today for follow up on her  medical conditions as noted below. Marcellus Lynn is c/o of   Chief Complaint   Patient presents with    3 Month Follow-Up       Patient Active Problem List:     Colitis     Proctitis     Diarrhea of presumed infectious origin     Left ovarian cyst     Recurrent colitis due to Clostridium difficile     Enterocolitis     Primary insomnia     Asymptomatic menopausal state      Past Medical History:   Diagnosis Date    Acid reflux     Shingles       Past Surgical History:   Procedure Laterality Date    APPENDECTOMY      CHOLECYSTECTOMY      HYSTERECTOMY       No family history on file. Current Outpatient Medications   Medication Sig Dispense Refill    diclofenac (VOLTAREN) 50 MG EC tablet Take 1 tablet by mouth 3 times daily as needed for Pain Indications: pt uses only occasionally. 60 tablet 1    gabapentin (NEURONTIN) 800 MG tablet Take 1 tablet by mouth 3 times daily for 30 days. 90 tablet 3    Lactobacillus (PROBIOTIC ACIDOPHILUS) TABS Take 1 tablet by mouth daily Further refills by PCP. 20 tablet 0    ranitidine (ZANTAC) 150 MG tablet Take 1 tablet by mouth 2 times daily 60 tablet 3    traZODone (DESYREL) 50 MG tablet Take 1 tablet by mouth nightly 30 tablet 1    traMADol (ULTRAM) 50 MG tablet Take 1 tablet by mouth 2 times daily as needed for Pain for up to 30 days.  60 tablet 0    aspirin 81 MG tablet Take 1 tablet by mouth daily 30 tablet 3    CALCIUM PLUS VITAMIN D3 600-500 MG-UNIT CAPS TAKE 1 CAPSULE BY MOUTH TWICE A DAY 60 capsule 1    zoster recombinant adjuvanted vaccine (SHINGRIX) 50 MCG/0.5ML SUSR injection Inject 0.5 mLs into the muscle See Admin Instructions 1 dose now and repeat in 2-6 months 0.5 mL 1    tiZANidine (ZANAFLEX) 2 MG tablet Take 1 tablet by mouth every 8 hours as needed (as needed) 90 tablet 1    calcium carbonate 600 MG TABS tablet Take 1 tablet by mouth 2 times daily affect. 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 C. difficile enteritis  Lactobacillus (PROBIOTIC ACIDOPHILUS) TABS   3. Gastroesophageal reflux disease, esophagitis presence not specified  ranitidine (ZANTAC) 150 MG tablet   4. Recurrent colitis due to Clostridium difficile  ranitidine (ZANTAC) 150 MG tablet   5. Enterocolitis  ranitidine (ZANTAC) 150 MG tablet   6. Need for shingles vaccine         Plan:   Call or return to clinic prn if these symptoms worsen or fail to improve as anticipated. I have reviewed the instructions with the patient, answering all questions to her and her daughter's satisfaction. Return in about 6 months (around 12/7/2019), or if symptoms worsen or fail to improve. No orders of the defined types were placed in this encounter. Orders Placed This Encounter   Medications    diclofenac (VOLTAREN) 50 MG EC tablet     Sig: Take 1 tablet by mouth 3 times daily as needed for Pain Indications: pt uses only occasionally. Dispense:  60 tablet     Refill:  1    gabapentin (NEURONTIN) 800 MG tablet     Sig: Take 1 tablet by mouth 3 times daily for 30 days. Dispense:  90 tablet     Refill:  3    Lactobacillus (PROBIOTIC ACIDOPHILUS) TABS     Sig: Take 1 tablet by mouth daily Further refills by PCP. Dispense:  20 tablet     Refill:  0    ranitidine (ZANTAC) 150 MG tablet     Sig: Take 1 tablet by mouth 2 times daily     Dispense:  60 tablet     Refill:  3    DISCONTD: tiZANidine (ZANAFLEX) 2 MG tablet     Sig: Take 1 tablet by mouth every 8 hours as needed (as needed)     Dispense:  60 tablet     Refill:  1    traZODone (DESYREL) 50 MG tablet     Sig: Take 1 tablet by mouth nightly     Dispense:  30 tablet     Refill:  1    traMADol (ULTRAM) 50 MG tablet     Sig: Take 1 tablet by mouth 2 times daily as needed for Pain for up to 30 days.      Dispense:  60 tablet     Refill:  0     Reduce doses taken as PLUS VITAMIN D3 600-500 MG-UNIT CAPS 60 capsule 1     Sig: TAKE 1 CAPSULE BY MOUTH TWICE A DAY    zoster recombinant adjuvanted vaccine (SHINGRIX) 50 MCG/0.5ML SUSR injection 0.5 mL 1     Sig: Inject 0.5 mLs into the muscle See Admin Instructions 1 dose now and repeat in 2-6 months    tiZANidine (ZANAFLEX) 2 MG tablet 90 tablet 1     Sig: Take 1 tablet by mouth every 8 hours as needed (as needed)       All patient questions answered. Patient voiced understanding. Quality Measures    Body mass index is 25.13 kg/m². Normal. Weight control planned discussed Healthy diet and regular exercise. BP: 118/72. Blood pressure is normal. Treatment plan consists of No treatment change needed. Fall Risk 3/7/2019   2 or more falls in past year? no   Fall with injury in past year? no     The patient does not have a history of falls. I did not - not indicated , complete a risk assessment for falls.  A plan of care for falls No Treatment plan indicated    Lab Results   Component Value Date    LDLCHOLESTEROL 116 03/19/2019    (goal LDL reduction with dx if diabetes is 50% LDL reduction)    PHQ Scores 3/7/2019   PHQ2 Score 0   PHQ9 Score 0     Interpretation of Total Score Depression Severity: 1-4 = Minimal depression, 5-9 = Mild depression, 10-14 = Moderate depression, 15-19 = Moderately severe depression, 20-27 = Severe depression       Electronically signed by Sukhdev Mena MD on 6/7/2019 at 10:12 AM       (Please note that portions of this note were completed with a voice recognition program. Efforts were made to edit the dictations but occasionally words are mis-transcribed.)

## 2019-07-10 DIAGNOSIS — B02.23 NEUROPATHY DUE TO HERPES ZOSTER: ICD-10-CM

## 2019-07-10 DIAGNOSIS — A04.72 C. DIFFICILE ENTERITIS: ICD-10-CM

## 2019-07-10 RX ORDER — TRAMADOL HYDROCHLORIDE 50 MG/1
50 TABLET ORAL 2 TIMES DAILY PRN
Qty: 60 TABLET | Refills: 0 | Status: SHIPPED | OUTPATIENT
Start: 2019-07-10 | End: 2019-07-26 | Stop reason: SDUPTHER

## 2019-07-10 RX ORDER — GREEN TEA/HOODIA GORDONII 315-12.5MG
1 CAPSULE ORAL DAILY
Qty: 20 TABLET | Refills: 5 | Status: SHIPPED | OUTPATIENT
Start: 2019-07-10 | End: 2019-07-26 | Stop reason: SDUPTHER

## 2019-07-26 DIAGNOSIS — A04.72 C. DIFFICILE ENTERITIS: ICD-10-CM

## 2019-07-26 DIAGNOSIS — B02.23 NEUROPATHY DUE TO HERPES ZOSTER: ICD-10-CM

## 2019-07-27 RX ORDER — TRAMADOL HYDROCHLORIDE 50 MG/1
50 TABLET ORAL 2 TIMES DAILY PRN
Qty: 40 TABLET | Refills: 0 | Status: SHIPPED | OUTPATIENT
Start: 2019-07-27 | End: 2019-10-25 | Stop reason: SDUPTHER

## 2019-07-27 RX ORDER — GREEN TEA/HOODIA GORDONII 315-12.5MG
1 CAPSULE ORAL DAILY
Qty: 20 TABLET | Refills: 5 | Status: SHIPPED | OUTPATIENT
Start: 2019-07-27 | End: 2019-10-24 | Stop reason: SDUPTHER

## 2019-08-08 RX ORDER — MAG HYDROX/ALUMINUM HYD/SIMETH 400-400-40
SUSPENSION, ORAL (FINAL DOSE FORM) ORAL
Qty: 60 CAPSULE | Refills: 1 | Status: SHIPPED | OUTPATIENT
Start: 2019-08-08 | End: 2019-10-07 | Stop reason: SDUPTHER

## 2019-08-08 NOTE — TELEPHONE ENCOUNTER
Health Maintenance   Topic Date Due    DTaP/Tdap/Td vaccine (1 - Tdap) 04[birth year]26/1964    Shingles Vaccine (1 of 2) 04/26/1994    Annual Wellness Visit (AWV)  04/26/2007    DEXA (modify frequency per FRAX score)  04/26/2009    Flu vaccine (1) 09/01/2019    Colon Cancer Screen FIT/FOBT  10/13/2019    Pneumococcal 65+ years Vaccine (2 of 2 - PPSV23) 10/15/2019    Lipid screen  03/19/2024             (applicable per patient's age: Cancer Screenings, Depression Screening, Fall Risk Screening, Immunizations)    LDL Cholesterol (mg/dL)   Date Value   03/19/2019 116     AST (U/L)   Date Value   03/19/2019 28     ALT (U/L)   Date Value   03/19/2019 27     BUN (mg/dL)   Date Value   03/19/2019 13      (goal A1C is < 7)   (goal LDL is <100) need 30-50% reduction from baseline     BP Readings from Last 3 Encounters:   06/07/19 118/72   03/07/19 116/69   12/12/18 (!) 133/55    (goal /80)      All Future Testing planned in [facility]:  Lab Frequency Next Occurrence   KELSY DIGITAL SCREEN W OR WO CAD BILATERAL Once 03/07/2019   DEXA BONE DENSITY 2 SITES Once 03/07/2019   Clostridium Difficile Toxin/Antigen Once 04/10/2019       Next Visit Date:  Future Appointments   Date Time Provider [place]y [place]   12/9/2019  9:00 AM Medina Lora MD [facility] [address] [facility]            Patient Active Problem List:     Colitis     Proctitis     Diarrhea of presumed infectious origin     Left ovarian cyst     Recurrent colitis due to Clostridium difficile     Enterocolitis     Primary insomnia     Asymptomatic menopausal state

## 2019-10-07 RX ORDER — TRAZODONE HYDROCHLORIDE 50 MG/1
50 TABLET ORAL NIGHTLY
Qty: 30 TABLET | Refills: 1 | Status: SHIPPED | OUTPATIENT
Start: 2019-10-07 | End: 2019-12-09 | Stop reason: SDUPTHER

## 2019-10-07 RX ORDER — MAG HYDROX/ALUMINUM HYD/SIMETH 400-400-40
SUSPENSION, ORAL (FINAL DOSE FORM) ORAL
Qty: 60 CAPSULE | Refills: 1 | Status: SHIPPED | OUTPATIENT
Start: 2019-10-07 | End: 2020-08-03 | Stop reason: SDUPTHER

## 2019-10-24 DIAGNOSIS — B02.23 NEUROPATHY DUE TO HERPES ZOSTER: ICD-10-CM

## 2019-10-24 DIAGNOSIS — A04.72 C. DIFFICILE ENTERITIS: ICD-10-CM

## 2019-10-25 RX ORDER — GREEN TEA/HOODIA GORDONII 315-12.5MG
1 CAPSULE ORAL DAILY
Qty: 30 TABLET | Refills: 5 | Status: SHIPPED | OUTPATIENT
Start: 2019-10-25 | End: 2019-12-09 | Stop reason: SDUPTHER

## 2019-10-25 RX ORDER — TRAMADOL HYDROCHLORIDE 50 MG/1
50 TABLET ORAL 2 TIMES DAILY PRN
Qty: 40 TABLET | Refills: 0 | Status: SHIPPED | OUTPATIENT
Start: 2019-10-25 | End: 2019-11-14

## 2019-11-11 ENCOUNTER — TELEPHONE (OUTPATIENT)
Dept: FAMILY MEDICINE CLINIC | Age: 74
End: 2019-11-11

## 2019-11-11 RX ORDER — GABAPENTIN 800 MG/1
800 TABLET ORAL 3 TIMES DAILY
Qty: 90 TABLET | Refills: 3 | Status: SHIPPED | OUTPATIENT
Start: 2019-11-11 | End: 2019-11-21 | Stop reason: SDUPTHER

## 2019-11-13 ENCOUNTER — NURSE ONLY (OUTPATIENT)
Dept: FAMILY MEDICINE CLINIC | Age: 74
End: 2019-11-13
Payer: COMMERCIAL

## 2019-11-13 DIAGNOSIS — Z23 NEED FOR INFLUENZA VACCINATION: Primary | ICD-10-CM

## 2019-11-13 PROCEDURE — G0008 ADMIN INFLUENZA VIRUS VAC: HCPCS | Performed by: FAMILY MEDICINE

## 2019-11-13 PROCEDURE — 90653 IIV ADJUVANT VACCINE IM: CPT | Performed by: FAMILY MEDICINE

## 2019-11-21 RX ORDER — GABAPENTIN 800 MG/1
800 TABLET ORAL 3 TIMES DAILY
Qty: 90 TABLET | Refills: 3 | Status: SHIPPED | OUTPATIENT
Start: 2019-11-21 | End: 2019-12-09 | Stop reason: SDUPTHER

## 2019-12-09 ENCOUNTER — OFFICE VISIT (OUTPATIENT)
Dept: FAMILY MEDICINE CLINIC | Age: 74
End: 2019-12-09
Payer: COMMERCIAL

## 2019-12-09 ENCOUNTER — HOSPITAL ENCOUNTER (OUTPATIENT)
Age: 74
Setting detail: SPECIMEN
Discharge: HOME | End: 2019-12-09
Payer: COMMERCIAL

## 2019-12-09 VITALS
TEMPERATURE: 97.8 F | RESPIRATION RATE: 16 BRPM | BODY MASS INDEX: 24 KG/M2 | OXYGEN SATURATION: 98 % | SYSTOLIC BLOOD PRESSURE: 124 MMHG | HEART RATE: 79 BPM | DIASTOLIC BLOOD PRESSURE: 70 MMHG | WEIGHT: 127 LBS

## 2019-12-09 DIAGNOSIS — Z23 NEED FOR PROPHYLACTIC VACCINATION AND INOCULATION AGAINST VARICELLA: ICD-10-CM

## 2019-12-09 DIAGNOSIS — B02.29 POST HERPETIC NEURALGIA: ICD-10-CM

## 2019-12-09 DIAGNOSIS — Z23 NEED FOR PNEUMOCOCCAL VACCINATION: ICD-10-CM

## 2019-12-09 DIAGNOSIS — Z78.0 POST-MENOPAUSAL: ICD-10-CM

## 2019-12-09 DIAGNOSIS — A04.72 C. DIFFICILE ENTERITIS: ICD-10-CM

## 2019-12-09 DIAGNOSIS — A04.71 RECURRENT COLITIS DUE TO CLOSTRIDIUM DIFFICILE: ICD-10-CM

## 2019-12-09 DIAGNOSIS — K52.9 ENTEROCOLITIS: ICD-10-CM

## 2019-12-09 DIAGNOSIS — F51.01 PRIMARY INSOMNIA: ICD-10-CM

## 2019-12-09 DIAGNOSIS — Z12.11 COLON CANCER SCREENING: ICD-10-CM

## 2019-12-09 DIAGNOSIS — B02.29 POST HERPETIC NEURALGIA: Primary | ICD-10-CM

## 2019-12-09 DIAGNOSIS — Z78.0 ASYMPTOMATIC MENOPAUSE: ICD-10-CM

## 2019-12-09 DIAGNOSIS — K21.9 GASTROESOPHAGEAL REFLUX DISEASE, ESOPHAGITIS PRESENCE NOT SPECIFIED: ICD-10-CM

## 2019-12-09 DIAGNOSIS — R79.89 LOW SERUM CALCIUM: ICD-10-CM

## 2019-12-09 PROCEDURE — 99214 OFFICE O/P EST MOD 30 MIN: CPT | Performed by: FAMILY MEDICINE

## 2019-12-09 PROCEDURE — 90732 PPSV23 VACC 2 YRS+ SUBQ/IM: CPT | Performed by: FAMILY MEDICINE

## 2019-12-09 PROCEDURE — G0009 ADMIN PNEUMOCOCCAL VACCINE: HCPCS | Performed by: FAMILY MEDICINE

## 2019-12-09 RX ORDER — RANITIDINE 150 MG/1
150 TABLET ORAL 2 TIMES DAILY
Qty: 60 TABLET | Refills: 3 | Status: SHIPPED | OUTPATIENT
Start: 2019-12-09 | End: 2020-03-11 | Stop reason: SDUPTHER

## 2019-12-09 RX ORDER — CHLORHEXIDINE GLUCONATE 0.12 MG/ML
15 RINSE ORAL 2 TIMES DAILY
Status: CANCELLED | OUTPATIENT
Start: 2019-12-09

## 2019-12-09 RX ORDER — SELENIUM 50 MCG
TABLET ORAL
Refills: 5 | COMMUNITY
Start: 2019-09-06

## 2019-12-09 RX ORDER — CHLORHEXIDINE GLUCONATE 0.12 MG/ML
15 RINSE ORAL 2 TIMES DAILY
Status: ON HOLD | COMMUNITY
End: 2021-11-16

## 2019-12-09 RX ORDER — PHENOL 1.4 %
1 AEROSOL, SPRAY (ML) MUCOUS MEMBRANE 2 TIMES DAILY
Qty: 60 TABLET | Refills: 3 | Status: SHIPPED | OUTPATIENT
Start: 2019-12-09 | End: 2020-04-20

## 2019-12-09 RX ORDER — TRAZODONE HYDROCHLORIDE 50 MG/1
50 TABLET ORAL NIGHTLY
Qty: 30 TABLET | Refills: 3 | Status: SHIPPED | OUTPATIENT
Start: 2019-12-09 | End: 2020-03-11 | Stop reason: SDUPTHER

## 2019-12-09 RX ORDER — GREEN TEA/HOODIA GORDONII 315-12.5MG
1 CAPSULE ORAL DAILY
Qty: 30 TABLET | Refills: 5 | Status: SHIPPED | OUTPATIENT
Start: 2019-12-09 | End: 2020-06-11 | Stop reason: SDUPTHER

## 2019-12-09 RX ORDER — TRAMADOL HYDROCHLORIDE 50 MG/1
50 TABLET ORAL 2 TIMES DAILY
Qty: 60 TABLET | Refills: 0 | Status: SHIPPED | OUTPATIENT
Start: 2019-12-09 | End: 2020-01-08

## 2019-12-09 RX ORDER — TIZANIDINE 2 MG/1
TABLET ORAL
Qty: 90 TABLET | Refills: 1 | Status: SHIPPED | OUTPATIENT
Start: 2019-12-09 | End: 2020-02-10

## 2019-12-09 RX ORDER — TIZANIDINE 2 MG/1
TABLET ORAL
Refills: 1 | COMMUNITY
Start: 2019-09-06 | End: 2019-12-09 | Stop reason: SDUPTHER

## 2019-12-09 RX ORDER — GABAPENTIN 800 MG/1
800 TABLET ORAL 3 TIMES DAILY
Qty: 90 TABLET | Refills: 3 | Status: SHIPPED | OUTPATIENT
Start: 2019-12-09 | End: 2020-03-11 | Stop reason: SDUPTHER

## 2019-12-09 RX ORDER — TRAMADOL HYDROCHLORIDE 50 MG/1
50 TABLET ORAL EVERY 6 HOURS PRN
COMMUNITY
End: 2019-12-09 | Stop reason: SDUPTHER

## 2019-12-09 RX ORDER — ZOLPIDEM TARTRATE 12.5 MG/1
12.5 TABLET, FILM COATED, EXTENDED RELEASE ORAL NIGHTLY PRN
Qty: 30 TABLET | Refills: 1 | Status: SHIPPED | OUTPATIENT
Start: 2019-12-09 | End: 2020-01-08

## 2019-12-12 LAB

## 2020-03-11 ENCOUNTER — OFFICE VISIT (OUTPATIENT)
Dept: FAMILY MEDICINE CLINIC | Age: 75
End: 2020-03-11
Payer: COMMERCIAL

## 2020-03-11 VITALS
WEIGHT: 122 LBS | TEMPERATURE: 98 F | DIASTOLIC BLOOD PRESSURE: 66 MMHG | HEART RATE: 77 BPM | SYSTOLIC BLOOD PRESSURE: 111 MMHG | BODY MASS INDEX: 23.03 KG/M2 | HEIGHT: 61 IN | OXYGEN SATURATION: 97 %

## 2020-03-11 PROCEDURE — G0439 PPPS, SUBSEQ VISIT: HCPCS | Performed by: FAMILY MEDICINE

## 2020-03-11 RX ORDER — TIZANIDINE 2 MG/1
TABLET ORAL
Qty: 90 TABLET | Refills: 0 | Status: SHIPPED | OUTPATIENT
Start: 2020-03-11 | End: 2020-04-20

## 2020-03-11 RX ORDER — RANITIDINE 150 MG/1
150 TABLET ORAL 2 TIMES DAILY
Qty: 60 TABLET | Refills: 3 | Status: SHIPPED | OUTPATIENT
Start: 2020-03-11 | End: 2020-06-11

## 2020-03-11 RX ORDER — GABAPENTIN 800 MG/1
800 TABLET ORAL 3 TIMES DAILY
Qty: 90 TABLET | Refills: 3 | Status: SHIPPED | OUTPATIENT
Start: 2020-03-11 | End: 2020-06-11 | Stop reason: SDUPTHER

## 2020-03-11 RX ORDER — TRAZODONE HYDROCHLORIDE 50 MG/1
50 TABLET ORAL NIGHTLY
Qty: 30 TABLET | Refills: 3 | Status: SHIPPED | OUTPATIENT
Start: 2020-03-11 | End: 2020-06-11 | Stop reason: SDUPTHER

## 2020-03-11 RX ORDER — ZOSTER VACCINE RECOMBINANT, ADJUVANTED 50 MCG/0.5
0.5 KIT INTRAMUSCULAR ONCE
Qty: 0.5 ML | Refills: 0 | Status: SHIPPED | OUTPATIENT
Start: 2020-03-11 | End: 2020-03-11

## 2020-03-11 ASSESSMENT — LIFESTYLE VARIABLES: HOW OFTEN DO YOU HAVE A DRINK CONTAINING ALCOHOL: 0

## 2020-03-11 ASSESSMENT — PATIENT HEALTH QUESTIONNAIRE - PHQ9
SUM OF ALL RESPONSES TO PHQ QUESTIONS 1-9: 1
SUM OF ALL RESPONSES TO PHQ QUESTIONS 1-9: 1

## 2020-03-11 NOTE — PATIENT INSTRUCTIONS
Personalized Preventive Plan for Margaret Bills - 3/11/2020  Medicare offers a range of preventive health benefits. Some of the tests and screenings are paid in full while other may be subject to a deductible, co-insurance, and/or copay. Some of these benefits include a comprehensive review of your medical history including lifestyle, illnesses that may run in your family, and various assessments and screenings as appropriate. After reviewing your medical record and screening and assessments performed today your provider may have ordered immunizations, labs, imaging, and/or referrals for you. A list of these orders (if applicable) as well as your Preventive Care list are included within your After Visit Summary for your review. Other Preventive Recommendations:    · A preventive eye exam performed by an eye specialist is recommended every 1-2 years to screen for glaucoma; cataracts, macular degeneration, and other eye disorders. · A preventive dental visit is recommended every 6 months. · Try to get at least 150 minutes of exercise per week or 10,000 steps per day on a pedometer . · Order or download the FREE \"Exercise & Physical Activity: Your Everyday Guide\" from [employer]. Call 7-663.390.5969 or search [employer] online. · You need 2789-2974 mg of calcium and 4002-1755 IU of vitamin D per day. It is possible to meet your calcium requirement with diet alone, but a vitamin D supplement is usually necessary to meet this goal.  · When exposed to the sun, use a sunscreen that protects against both UVA and UVB radiation with an SPF of 30 or greater. Reapply every 2 to 3 hours or after sweating, drying off with a towel, or swimming. · Always wear a seat belt when traveling in a car. Always wear a helmet when riding a bicycle or motorcycle. Heart-Healthy Diet   Sodium, Fat, and Cholesterol Controlled Diet       What Is a Heart Healthy Diet?    A good cholesterol, this type of cholesterol actually carries cholesterol away from your arteries and may, therefore, help lower your risk of having a heart attack. You want this level to be high (ideally greater than 60). It is a risk to have a level less than 40. You can raise this good cholesterol by eating olive oil, canola oil, avocados, or nuts. Exercise raises this level, too. Fat    Fat is calorie dense and packs a lot of calories into a small amount of food. Even though fats should be limited due to their high calorie content, not all fats are bad. In fact, some fats are quite healthful. Fat can be broken down into four main types. The good-for-you fats are:   Monounsaturated fat  found in oils such as olive and canola, avocados, and nuts and natural nut butters; can decrease cholesterol levels, while keeping levels of HDL cholesterol high   Polyunsaturated fat  found in oils such as safflower, sunflower, soybean, corn, and sesame; can decrease total cholesterol and LDL cholesterol   Omega-3 fatty acids  particularly those found in fatty fish (such as salmon, trout, tuna, mackerel, herring, and sardines); can decrease risk of arrhythmias, decrease triglyceride levels, and slightly lower blood pressure   The fats that you want to limit are:   Saturated fat  found in animal products, many fast foods, and a few vegetables; increases total blood cholesterol, including LDL levels   Animal fats that are saturated include: butter, lard, whole-milk dairy products, meat fat, and poultry skin   Vegetable fats that are saturated include: hydrogenated shortening, palm oil, coconut oil, cocoa butter   Hydrogenated or trans fat  found in margarine and vegetable shortening, most shelf stable snack foods, and fried foods; increases LDL and decreases HDL     It is generally recommended that you limit your total fat for the day to less than 30% of your total calories.  If you follow an 1800-calorie heart healthy diet, for example, this would mean 60 grams of fat or less per day. Saturated fat and trans fat in your diet raises your blood cholesterol the most, much more than dietary cholesterol does. For this reason, on a heart-healthy diet, less than 7% of your calories should come from saturated fat and ideally 0% from trans fat. On an 1800-calorie diet, this translates into less than 14 grams of saturated fat per day, leaving 46 grams of fat to come from mono- and polyunsaturated fats.    Food Choices on a Heart Healthy Diet   Food Category   Foods Recommended   Foods to Avoid   Grains   Breads and rolls without salted tops Most dry and cooked cereals Unsalted crackers and breadsticks Low-sodium or homemade breadcrumbs or stuffing All rice and pastas   Breads, rolls, and crackers with salted tops High-fat baked goods (eg, muffins, donuts, pastries) Quick breads, self-rising flour, and biscuit mixes Regular bread crumbs Instant hot cereals Commercially prepared rice, pasta, or stuffing mixes   Vegetables   Most fresh, frozen, and low-sodium canned vegetables Low-sodium and salt-free vegetable juices Canned vegetables if unsalted or rinsed   Regular canned vegetables and juices, including sauerkraut and pickled vegetables Frozen vegetables with sauces Commercially prepared potato and vegetable mixes   Fruits   Most fresh, frozen, and canned fruits All fruit juices   Fruits processed with salt or sodium   Milk   Nonfat or low-fat (1%) milk Nonfat or low-fat yogurt Cottage cheese, low-fat ricotta, cheeses labeled as low-fat and low-sodium   Whole milk Reduced-fat (2%) milk Malted and chocolate milk Full fat yogurt Most cheeses (unless low-fat and low salt) Buttermilk (no more than 1 cup per week)   Meats and Beans   Lean cuts of fresh or frozen beef, veal, lamb, or pork (look for the word loin) Fresh or frozen poultry without the skin Fresh or frozen fish and some shellfish Egg whites and egg substitutes (Limit whole eggs to three per week) Tofu Nuts or seeds (unsalted, dry-roasted), low-sodium peanut butter Dried peas, beans, and lentils   Any smoked, cured, salted, or canned meat, fish, or poultry (including troy, chipped beef, cold cuts, hot dogs, sausages, sardines, and anchovies) Poultry skins Breaded and/or fried fish or meats Canned peas, beans, and lentils Salted nuts   Fats and Oils   Olive oil and canola oil Low-sodium, low-fat salad dressings and mayonnaise   Butter, margarine, coconut and palm oils, troy fat   Snacks, Sweets, and Condiments   Low-sodium or unsalted versions of broths, soups, soy sauce, and condiments Pepper, herbs, and spices; vinegar, lemon, or lime juice Low-fat frozen desserts (yogurt, sherbet, fruit bars) Sugar, cocoa powder, honey, syrup, jam, and preserves Low-fat, trans-fat free cookies, cakes, and pies Zack and animal crackers, fig bars, talat snaps   High-fat desserts Broth, soups, gravies, and sauces, made from instant mixes or other high-sodium ingredients Salted snack foods Canned olives Meat tenderizers, seasoning salt, and most flavored vinegars   Beverages   Low-sodium carbonated beverages Tea and coffee in moderation Soy milk   Commercially softened water   Suggestions   Make whole grains, fruits, and vegetables the base of your diet. Choose heart-healthy fats such as canola, olive, and flaxseed oil, and foods high in heart-healthy fats, such as nuts, seeds, soybeans, tofu, and fish. Eat fish at least twice per week; the fish highest in omega-3 fatty acids and lowest in mercury include salmon, herring, mackerel, sardines, and canned chunk light tuna. If you eat fish less than twice per week or have high triglycerides, talk to your doctor about taking fish oil supplements. Read food labels.    For products low in fat and cholesterol, look for fat free, low-fat, cholesterol free, saturated fat free, and trans fat freeAlso scan the Nutrition Facts Label, which lists saturated fat, trans fat, carrots, cauliflower, celery, corn, greens, green beans, green pepper, onions, peas, potatoes (with skin), snow peas, spinach, squash, sweet potatoes, tomatoes, zucchini   For maximum fiber intake, eat the peels of fruits and vegetablesjust be sure to wash them well first.   Fruits   All fruits, especially apples, berries, grapefruits, mangoes, nectarines, oranges, peaches, pears, dried fruits (figs, dates, prunes, raisins)   Choose raw fruits and vegetables over juice, cooked, or cannedraw fruit has more fiber. Dried fruit is also a good source of fiber. Milk   With the exception of yogurt containing inulin (a type of fiber), dairy foods provide little fiber. Add more fiber by topping your yogurt or cottage cheese with fresh fruit, whole grain or bran cereals, nuts, or seeds. Meats and Beans   All beans and peas, especially Garbanzo beans, kidney beans, lentils, lima beans, split peas, and shane beans All nuts and seeds, especially almonds, peanuts, [place] nuts, cashews, peanut butter, walnuts, sesame and sunflower seeds All meat, poultry, fish, and eggs   Increase fiber in meat dishes by adding shane beans, kidney beans, black-eyed peas, bran, or oatmeal. If you are following a low-fat diet, use nuts and seeds only in moderation. Fats and Oils   All in moderation   Fats and oils do not provide fiber   Snacks, Sweets, and Condiments   Fruit Nuts Popcorn, whole-wheat pretzels, or trail mix made with dried fruits, nuts, and seeds Cakes, breads, and cookies made with oatmeal or whole-wheat flour   Most snack foods do not provide much fiber. Choose snacks with at least 2 grams of fiber per serving. Last Reviewed: March 2011 Jsoe Carlos MS, MPH, RD   Updated: 3/29/2011   ·     Keep Your Memory Everett Blocker       Many factors can affect your ability to remembera hectic lifestyle, aging, stress, chronic disease, and certain medicines.  But, there are steps you can take to sharpen your mind and help preserve your memory. Challenge Your Brain   Regularly challenging your mind may help keeps it in top shape. Good mental exercises include:   Crossword puzzlesUse a dictionary if you need it; you will learn more that way. Brainteasers Try some! Crafts, such as wood working and sewing   Hobbies, such as gardening and building model airplanes   SocializingVisit old friends or join groups to meet new ones. Reading   Learning a new language   Taking a class, whether it be art history or abe chi   TravelingExperience the food, history, and culture of your destination   Learning to use a computer   Going to museums, the theater, or thought-provoking movies   Changing things in your daily life, such as reversing your pattern in the grocery store or brushing your teeth using your nondominant hand   Use Memory Aids   There is no need to remember every detail on your own. These memory aids can help:   Calendars and day planners   Electronic organizers to store all sorts of helpful informationThese devices can \"beep\" to remind you of appointments. A book of days to record birthdays, anniversaries, and other occasions that occur on the same date every year   Detailed \"to-do\" lists and strategically placed sticky notes   Quick \"study\" sessionsBefore a gathering, review who will be there so their names will be fresh in your mind. Establish routinesFor example, keep your keys, wallet, and umbrella in the same place all the time or take medicine with your 8:00 AM glass of juice   Live a Healthy Life   Many actions that will keep your body strong will do the same for your mind. For example:   Talk to Your Doctor About Herbs and Supplements    Malnutrition and vitamin deficiencies can impair your mental function. For example, vitamin B12 deficiency can cause a range of symptoms, including confusion. But, what if your nutritional needs are being met? Can herbs and supplements still offer a benefit?  Researchers have investigated a range of natural remedies, such as ginkgo , ginseng , and the supplement phosphatidylserine (PS). So far, though, the evidence is inconsistent as to whether these products can improve memory or thinking. If you are interested in taking herbs and supplements, talk to your doctor first because they may interact with other medicines that you are taking. Exercise Regularly    Among the many benefits of regular exercise are increased blood flow to the brain and decreased risk of certain diseases that can interfere with memory function. One study found that even moderate exercise has a beneficial effect. Examples of \"moderate\" exercise include:   Playing 18 holes of golf once a week, without a cart   Playing tennis twice a week   Walking one mile per day   Manage Stress    It can be tough to remember what is important when your mind is cluttered. Make time for relaxation. Choose activities that calm you down, and make it routine. Manage Chronic Conditions    Side effects of high blood pressure , diabetes, and heart disease can interfere with mental function. Many of the lifestyle steps discussed here can help manage these conditions. Strive to eat a healthy diet, exercise regularly, get stress under control, and follow your doctor's advice for your condition. Minimize Medications    Talk to your doctor about the medicines that you take. Some may be unnecessary. Also, healthy lifestyle habits may lower the need for certain drugs. Last Reviewed: April 2010 Galo Altman MD   Updated: 4/13/2010   ·     [address] [place] [address]       As we get older, changes in balance, gait, strength, vision, hearing, and cognition make even the most youthful senior more prone to accidents. Falls are one of the leading health risks for older people.  This increased risk of falling is related to:   Aging process (eg, decreased muscle strength, slowed reflexes)   Higher incidence of chronic health problems (eg, arthritis, diabetes) that and serious injuries due to residential fires. When cooking, wear short-sleeved items, never a bulky long-sleeved robe. The [facility]'s Safety Checklist for Older Consumers emphasizes the importance of checking basements, garages, workshops and storage areas for fire hazards, such as volatile liquids, piles of old rags or clothing and overloaded circuits. Never smoke in bed or when lying down on a couch or recliner chair. Small portable electric or kerosene heaters are responsible for many home fires and should be used cautiously if at all. If you do use one, be sure to keep them away from flammable materials. In case of fire, make sure you have a pre-established emergency exit plan. Have a professional check your fireplace and other fuel-burning appliances yearly. Helping Hands   Baby boomers entering the bacon years will continue to see the development of new products to help older adults live safely and independently in spite of age-related changes. Making Life More Livable  , by Brea Navarro, lists over 1,000 products for \"living well in the mature years,\" such as bathing and mobility aids, household security devices, ergonomically designed knives and peelers, and faucet valves and knobs for temperature control. Medical supply stores and organizations are good sources of information about products that improve your quality of life and insure your safety.      Last Reviewed: November 2009 Kye Joe MD   Updated: 3/7/2011     ·

## 2020-03-11 NOTE — PROGRESS NOTES
MD   aspirin 81 MG tablet Take 1 tablet by mouth daily  Lacey Boyd MD   calcium carbonate 600 MG TABS tablet Take 1 tablet by mouth 2 times daily W/ 500mg Vitamin D3  Lacey Boyd MD   CALCIUM PLUS VITAMIN D3 600-500 MG-UNIT CAPS TAKE 1 CAPSULE BY MOUTH TWICE A DAY  Lacey Boyd MD   zoster recombinant adjuvanted vaccine (SHINGRIX) 50 MCG/0.5ML SUSR injection Inject 0.5 mLs into the muscle See Admin Instructions 1 dose now and repeat in 2-6 months  Lacey Boyd MD         Past Medical History:   Diagnosis Date    Acid reflux     Shingles        Past Surgical History:   Procedure Laterality Date    APPENDECTOMY      CHOLECYSTECTOMY      HYSTERECTOMY         No family history on file. CareTeam (Including outside providers/suppliers regularly involved in providing care):   Patient Care Team:  Lacey Boyd MD as PCP - General (Family Medicine)  Lacey Boyd MD as PCP - [facility] Provider    Wt Readings from Last 3 Encounters:   03/11/20 122 lb (55.3 kg)   12/09/19 127 lb (57.6 kg)   06/07/19 133 lb (60.3 kg)     Vitals:    03/11/20 0947   BP: 111/66   Pulse: 77   Temp: 98 °F (36.7 °C)   TempSrc: Oral   SpO2: 97%   Weight: 122 lb (55.3 kg)   Height: 5' 1\" (1.549 m)     Body mass index is 23.05 kg/m². Based upon direct observation of the patient, evaluation of cognition reveals recent and remote memory intact. HENT:   /66   Pulse 77   Temp 98 °F (36.7 °C) (Oral)   Ht 5' 1\" (1.549 m)   Wt 122 lb (55.3 kg)   SpO2 97%   BMI 23.05 kg/m²   Constitutional: Alert and oriented. Well-nourished. Head: Normocephalic and atraumatic. Right Ear: External ear normal. TM: no bulging, erythema or fluid seen. Left Ear: External ear normal. TM: no bulging, erythema or fluid seen. Nose: Nose normal.   Mouth/Throat: Oropharynx is clear and moist. Teeth in good repair and good health. Eyes: Pupils are equal, round, and reactive to light. Right eye exhibits no discharge.  Left eye exhibits no discharge. No scleral icterus. Neck: Normal range of motion. Neck supple. No JVD present. No tracheal deviation present. No thyromegaly present. Cardiovascular: Normal rate, regular rhythm, normal heart sounds. Pulmonary/Chest: Effort normal and breath sounds normal. No respiratory distress. She has no wheezes. She has no rales. Abdominal: Soft. Bowel sounds are normal.  She exhibits no distension and no mass. There is no tenderness. There is no rebound and no guarding. Musculoskeletal: Normal range of motion. She exhibits no edema or tenderness. Lymphadenopathy:    She has no cervical adenopathy. Neurological:  She is alert and oriented to person, place, and time. Cranial nerves grossly intact. No sensation problem noted. Muscle strength 5/5 throughout. Skin: Skin is warm and dry. No rash noted. No erythema. Psychiatric:  She has a normal mood and affect. Behavior is normal.      Patient's complete Health Risk Assessment and screening values have been reviewed and are found in Flowsheets. The following problems were reviewed today and where indicated follow up appointments were made and/or referrals ordered. Positive Risk Factor Screenings with Interventions:     No Positive Risk Factors identified today.     Personalized Preventive Plan   Current Health Maintenance Status  Immunization History   Administered Date(s) Administered    Influenza, Quadv, 6 mo and older, IM, PF (Flulaval, Fluarix) 10/15/2018    Influenza, Triv, inactivated, subunit, adjuvanted, IM (Fluad 65 yrs and older) 10/17/2017, 11/13/2019    Pneumococcal Conjugate 13-valent (Taisha Gonzales) 10/15/2018    Pneumococcal Polysaccharide ([id number]) 12/09/2019    Zoster Recombinant (Shingrix) 07/18/2019        Health Maintenance   Topic Date Due    DTaP/Tdap/Td vaccine (1 - Tdap) 04/26/1964    DEXA (modify frequency per FRAX score)  04/26/2009    Annual Wellness Visit (AWV)  05/29/2019    Shingles Vaccine (2 of due to Clostridium difficile  -     raNITIdine (ZANTAC) 150 MG tablet; Take 1 tablet by mouth 2 times daily    Enterocolitis  -     raNITIdine (ZANTAC) 150 MG tablet; Take 1 tablet by mouth 2 times daily    Primary insomnia  -     traZODone (DESYREL) 50 MG tablet; Take 1 tablet by mouth nightly    Other orders  -     tiZANidine (ZANAFLEX) 2 MG tablet; TAKE ONE TABLET BY MOUTH EVERY 8 HOURS AS NEEDED  -     diclofenac (VOLTAREN) 50 MG EC tablet; TAKE ONE TABLET BY MOUTH THREE TIMES A DAY        Call or return to clinic prn if these symptoms worsen or fail to improve as anticipated. I have reviewed the instructions with the patient, answering all questions to her satisfaction. I did discuss with patient fall prevention diet changes. Did give her a lot of printouts to read. See me back in 3 months for follow-up.

## 2020-04-20 RX ORDER — TIZANIDINE 2 MG/1
TABLET ORAL
Qty: 90 TABLET | Refills: 0 | Status: SHIPPED | OUTPATIENT
Start: 2020-04-20 | End: 2020-05-21

## 2020-04-20 RX ORDER — TRAMADOL HYDROCHLORIDE 50 MG/1
TABLET ORAL
Qty: 60 TABLET | Refills: 0 | Status: SHIPPED | OUTPATIENT
Start: 2020-04-20 | End: 2020-05-21

## 2020-04-24 ENCOUNTER — TELEPHONE (OUTPATIENT)
Dept: FAMILY MEDICINE CLINIC | Age: 75
End: 2020-04-24

## 2020-05-21 RX ORDER — ASPIRIN 81 MG/1
TABLET, COATED ORAL
Qty: 30 TABLET | Refills: 2 | Status: SHIPPED | OUTPATIENT
Start: 2020-05-21 | End: 2020-08-25

## 2020-05-21 RX ORDER — TRAMADOL HYDROCHLORIDE 50 MG/1
TABLET ORAL
Qty: 60 TABLET | Refills: 0 | Status: SHIPPED | OUTPATIENT
Start: 2020-05-21 | End: 2020-07-06

## 2020-05-21 RX ORDER — TIZANIDINE 2 MG/1
TABLET ORAL
Qty: 90 TABLET | Refills: 0 | Status: SHIPPED | OUTPATIENT
Start: 2020-05-21 | End: 2020-07-06

## 2020-06-11 ENCOUNTER — OFFICE VISIT (OUTPATIENT)
Dept: FAMILY MEDICINE CLINIC | Age: 75
End: 2020-06-11
Payer: COMMERCIAL

## 2020-06-11 VITALS
SYSTOLIC BLOOD PRESSURE: 112 MMHG | DIASTOLIC BLOOD PRESSURE: 63 MMHG | BODY MASS INDEX: 23.05 KG/M2 | TEMPERATURE: 97.4 F | OXYGEN SATURATION: 97 % | HEART RATE: 71 BPM | WEIGHT: 122 LBS

## 2020-06-11 PROCEDURE — 99213 OFFICE O/P EST LOW 20 MIN: CPT | Performed by: FAMILY MEDICINE

## 2020-06-11 RX ORDER — AMOXICILLIN 250 MG
2 CAPSULE ORAL DAILY PRN
Qty: 20 TABLET | Refills: 0 | Status: SHIPPED | OUTPATIENT
Start: 2020-06-11 | End: 2020-07-01

## 2020-06-11 RX ORDER — TRAZODONE HYDROCHLORIDE 50 MG/1
50 TABLET ORAL NIGHTLY
Qty: 30 TABLET | Refills: 3 | Status: SHIPPED | OUTPATIENT
Start: 2020-06-11

## 2020-06-11 RX ORDER — POLYETHYLENE GLYCOL 3350 17 G/17G
17 POWDER, FOR SOLUTION ORAL DAILY PRN
Qty: 527 G | Refills: 1 | Status: SHIPPED | OUTPATIENT
Start: 2020-06-11 | End: 2020-07-11

## 2020-06-11 RX ORDER — GREEN TEA/HOODIA GORDONII 315-12.5MG
1 CAPSULE ORAL DAILY
Qty: 30 TABLET | Refills: 5 | Status: SHIPPED | OUTPATIENT
Start: 2020-06-11

## 2020-06-11 RX ORDER — CIMETIDINE 800 MG
800 TABLET ORAL 2 TIMES DAILY
Qty: 30 TABLET | Refills: 3 | Status: SHIPPED | OUTPATIENT
Start: 2020-06-11

## 2020-06-11 RX ORDER — GABAPENTIN 800 MG/1
800 TABLET ORAL 3 TIMES DAILY
Qty: 90 TABLET | Refills: 3 | Status: SHIPPED | OUTPATIENT
Start: 2020-06-11 | End: 2021-02-19 | Stop reason: SDUPTHER

## 2020-06-11 RX ORDER — CLOTRIMAZOLE 1 %
CREAM (GRAM) TOPICAL
Qty: 45 G | Refills: 1 | Status: SHIPPED | OUTPATIENT
Start: 2020-06-11 | End: 2020-06-18

## 2020-06-11 NOTE — PROGRESS NOTES
General FM note    Horacio Fagan is a 67 y.o. female who presents today for follow up on her  medical conditions as noted below. Horacio Fagan is c/o of   Chief Complaint   Patient presents with    3 Month Follow-Up     zantac recalled need new med       Patient Active Problem List:     Colitis     Proctitis     Diarrhea of presumed infectious origin     Left ovarian cyst     Recurrent colitis due to Clostridium difficile     Enterocolitis     Primary insomnia     Asymptomatic menopausal state      Past Medical History:   Diagnosis Date    Acid reflux     Shingles       Past Surgical History:   Procedure Laterality Date    APPENDECTOMY      CHOLECYSTECTOMY      HYSTERECTOMY       No family history on file. Current Outpatient Medications   Medication Sig Dispense Refill    gabapentin (NEURONTIN) 800 MG tablet Take 1 tablet by mouth 3 times daily for 30 days. 90 tablet 3    traZODone (DESYREL) 50 MG tablet Take 1 tablet by mouth nightly 30 tablet 3    cimetidine (TAGAMET) 800 MG tablet Take 1 tablet by mouth 2 times daily 30 tablet 3    Probiotic Acidophilus (FLORANEX) TABS Take 1 tablet by mouth daily 30 tablet 5    polyethylene glycol (MIRALAX) 17 g packet Take 17 g by mouth daily as needed for Constipation 527 g 1    senna-docusate (PERICOLACE) 8.6-50 MG per tablet Take 2 tablets by mouth daily as needed for Constipation 20 tablet 0    clotrimazole (LOTRIMIN) 1 % cream Apply topically 2 times daily.  45 g 1    ASPIRIN LOW DOSE 81 MG EC tablet TAKE ONE TABLET BY MOUTH DAILY 30 tablet 2    tiZANidine (ZANAFLEX) 2 MG tablet TAKE ONE TABLET BY MOUTH EVERY 8 HOURS AS NEEDED 90 tablet 0    diclofenac (VOLTAREN) 50 MG EC tablet TAKE ONE TABLET BY MOUTH THREE TIMES A DAY 90 tablet 0    traMADol (ULTRAM) 50 MG tablet TAKE ONE TABLET BY MOUTH TWICE A DAY **REDUCE DOSES TAKEN AS PAIN BECOMES MANAGEABLE** 60 tablet 0    calcium carbonate 1500 (600 Ca) MG TABS tablet TAKE ONE TABLET BY MOUTH TWICE A Acidophilus (FLORANEX) TABS     Sig: Take 1 tablet by mouth daily     Dispense:  30 tablet     Refill:  5    polyethylene glycol (MIRALAX) 17 g packet     Sig: Take 17 g by mouth daily as needed for Constipation     Dispense:  527 g     Refill:  1    senna-docusate (PERICOLACE) 8.6-50 MG per tablet     Sig: Take 2 tablets by mouth daily as needed for Constipation     Dispense:  20 tablet     Refill:  0    clotrimazole (LOTRIMIN) 1 % cream     Sig: Apply topically 2 times daily. Dispense:  45 g     Refill:  1       Clary received counseling on the following healthy behaviors: nutrition, exercise and medication adherence  Reviewed prior labs and health maintenance. Continue current medications, diet and exercise. Discussed use, benefit, and side effects of prescribed medications. Barriers to medication compliance addressed. Patient given educational materials - see patient instructions. All patient questions answered. Patient voiced understanding.       Electronically signed by Pratik Calderon MD on 6/11/2020 at 6:56 PM       (Please note that portions of this note were completed with a voice recognition program. Efforts were made to edit the dictations but occasionally words are mis-transcribed.)

## 2020-06-22 ENCOUNTER — HOSPITAL ENCOUNTER (OUTPATIENT)
Dept: MAMMOGRAPHY | Age: 75
Discharge: HOME | End: 2020-06-24
Payer: COMMERCIAL

## 2020-06-22 PROCEDURE — 77063 BREAST TOMOSYNTHESIS BI: CPT

## 2020-06-22 PROCEDURE — 77080 DXA BONE DENSITY AXIAL: CPT

## 2020-07-06 RX ORDER — TRAMADOL HYDROCHLORIDE 50 MG/1
TABLET ORAL
Qty: 60 TABLET | Refills: 0 | Status: SHIPPED | OUTPATIENT
Start: 2020-07-06 | End: 2020-08-03

## 2020-07-06 RX ORDER — TIZANIDINE 2 MG/1
TABLET ORAL
Qty: 90 TABLET | Refills: 0 | Status: SHIPPED | OUTPATIENT
Start: 2020-07-06 | End: 2020-08-03

## 2020-08-03 RX ORDER — TRAMADOL HYDROCHLORIDE 50 MG/1
TABLET ORAL
Qty: 60 TABLET | Refills: 0 | Status: SHIPPED | OUTPATIENT
Start: 2020-08-03 | End: 2020-09-10

## 2020-08-03 RX ORDER — MAG HYDROX/ALUMINUM HYD/SIMETH 400-400-40
SUSPENSION, ORAL (FINAL DOSE FORM) ORAL
Qty: 60 CAPSULE | Refills: 1 | Status: SHIPPED | OUTPATIENT
Start: 2020-08-03

## 2020-08-03 RX ORDER — TIZANIDINE 2 MG/1
TABLET ORAL
Qty: 90 TABLET | Refills: 0 | Status: SHIPPED | OUTPATIENT
Start: 2020-08-03 | End: 2020-09-10

## 2020-08-03 NOTE — TELEPHONE ENCOUNTER
Nohemy Hawkins is calling to request a refill on the following medication(s):    Last Visit Date (If Applicable):  7/97/3942    Next Visit Date:    Visit date not found    Medication Request:  Requested Prescriptions     Pending Prescriptions Disp Refills    tiZANidine (ZANAFLEX) 2 MG tablet [Pharmacy Med Name: tiZANidine HCL 2MG TABLET] 90 tablet 0     Sig: TAKE ONE TABLET BY MOUTH EVERY 8 HOURS AS NEEDED    diclofenac (VOLTAREN) 50 MG EC tablet [Pharmacy Med Name: DICLOFENAC SOD DR 50 MG TAB] 90 tablet 0     Sig: TAKE ONE TABLET BY MOUTH THREE TIMES A DAY    traMADol (ULTRAM) 50 MG tablet [Pharmacy Med Name: traMADol HCL 50MG TABLET] 60 tablet 0     Sig: TAKE ONE TABLET BY MOUTH TWICE A DAY *REDUCE DOSES TAKEN AS PAIN BECOMES MANAGEABLE*

## 2020-08-05 NOTE — TELEPHONE ENCOUNTER
Courtney Arita is calling to request a refill on the following medication(s):    Last Visit Date (If Applicable):  [unfilled]    Next Visit Date:    Visit date not found    Medication Request:  Requested Prescriptions     Pending Prescriptions Disp Refills    calcium carbonate 1500 (600 Ca) MG TABS tablet [Pharmacy Med Name: CALCIUM 600 MG TABLET] 60 tablet 1     Sig: TAKE ONE TABLET BY MOUTH TWICE A DAY WITH VITAMIN D3 500MG

## 2020-08-25 RX ORDER — ASPIRIN 81 MG/1
TABLET ORAL
Qty: 30 TABLET | Refills: 1 | Status: SHIPPED | OUTPATIENT
Start: 2020-08-25 | End: 2020-10-22

## 2020-08-27 ENCOUNTER — TELEPHONE (OUTPATIENT)
Dept: FAMILY MEDICINE CLINIC | Age: 75
End: 2020-08-27

## 2020-09-10 RX ORDER — TIZANIDINE 2 MG/1
TABLET ORAL
Qty: 90 TABLET | Refills: 0 | Status: SHIPPED | OUTPATIENT
Start: 2020-09-10 | End: 2020-10-26

## 2020-09-10 RX ORDER — TRAMADOL HYDROCHLORIDE 50 MG/1
TABLET ORAL
Qty: 60 TABLET | Refills: 0 | Status: SHIPPED | OUTPATIENT
Start: 2020-09-10 | End: 2020-10-26

## 2020-09-10 NOTE — TELEPHONE ENCOUNTER
Adriane Robert is calling to request a refill on the following medication(s):    Last Visit Date (If Applicable):  3/53/2017    Next Visit Date:    9/14/2020    Medication Request:  Requested Prescriptions     Pending Prescriptions Disp Refills    tiZANidine (ZANAFLEX) 2 MG tablet [Pharmacy Med Name: tiZANidine HCL 2MG TABLET] 90 tablet 0     Sig: TAKE ONE TABLET BY MOUTH EVERY 8 HOURS AS NEEDED    traMADol (ULTRAM) 50 MG tablet [Pharmacy Med Name: traMADol HCL 50MG TABLET] 60 tablet 0     Sig: TAKE ONE TABLET BY MOUTH TWICE A DAY.  *REDUCE DOSES TAKEN AS PAIN BECOMES MANAGEABLE*

## 2020-09-14 ENCOUNTER — OFFICE VISIT (OUTPATIENT)
Dept: FAMILY MEDICINE CLINIC | Age: 75
End: 2020-09-14
Payer: COMMERCIAL

## 2020-09-14 VITALS
TEMPERATURE: 98.2 F | BODY MASS INDEX: 22.52 KG/M2 | SYSTOLIC BLOOD PRESSURE: 116 MMHG | DIASTOLIC BLOOD PRESSURE: 69 MMHG | WEIGHT: 119.2 LBS | OXYGEN SATURATION: 98 % | HEART RATE: 73 BPM

## 2020-09-14 PROCEDURE — G0439 PPPS, SUBSEQ VISIT: HCPCS | Performed by: FAMILY MEDICINE

## 2020-09-14 PROCEDURE — 90715 TDAP VACCINE 7 YRS/> IM: CPT | Performed by: FAMILY MEDICINE

## 2020-09-14 PROCEDURE — G0008 ADMIN INFLUENZA VIRUS VAC: HCPCS | Performed by: FAMILY MEDICINE

## 2020-09-14 PROCEDURE — 90471 IMMUNIZATION ADMIN: CPT | Performed by: FAMILY MEDICINE

## 2020-09-14 PROCEDURE — 90694 VACC AIIV4 NO PRSRV 0.5ML IM: CPT | Performed by: FAMILY MEDICINE

## 2020-09-14 RX ORDER — SERTRALINE HYDROCHLORIDE 25 MG/1
25 TABLET, FILM COATED ORAL DAILY
Qty: 30 TABLET | Refills: 3 | Status: SHIPPED | OUTPATIENT
Start: 2020-09-14 | End: 2021-01-11

## 2020-09-14 SDOH — ECONOMIC STABILITY: FOOD INSECURITY: WITHIN THE PAST 12 MONTHS, THE FOOD YOU BOUGHT JUST DIDN'T LAST AND YOU DIDN'T HAVE MONEY TO GET MORE.: NEVER TRUE

## 2020-09-14 SDOH — ECONOMIC STABILITY: FOOD INSECURITY: WITHIN THE PAST 12 MONTHS, YOU WORRIED THAT YOUR FOOD WOULD RUN OUT BEFORE YOU GOT MONEY TO BUY MORE.: NEVER TRUE

## 2020-09-14 SDOH — ECONOMIC STABILITY: INCOME INSECURITY: HOW HARD IS IT FOR YOU TO PAY FOR THE VERY BASICS LIKE FOOD, HOUSING, MEDICAL CARE, AND HEATING?: NOT HARD AT ALL

## 2020-09-14 ASSESSMENT — PATIENT HEALTH QUESTIONNAIRE - PHQ9
SUM OF ALL RESPONSES TO PHQ QUESTIONS 1-9: 2
1. LITTLE INTEREST OR PLEASURE IN DOING THINGS: 0
SUM OF ALL RESPONSES TO PHQ QUESTIONS 1-9: 2
SUM OF ALL RESPONSES TO PHQ9 QUESTIONS 1 & 2: 2
2. FEELING DOWN, DEPRESSED OR HOPELESS: 2

## 2020-09-14 ASSESSMENT — LIFESTYLE VARIABLES: HOW OFTEN DO YOU HAVE A DRINK CONTAINING ALCOHOL: 0

## 2020-09-14 NOTE — PROGRESS NOTES
Vaccine Information Sheet, \"Influenza - Inactivated\"  given to Eda Bourne, or parent/legal guardian of  Eda Bourne and verbalized understanding. Patient responses:    Have you ever had a reaction to a flu vaccine? No  Do you have any current illness? No  Have you ever had [place] Syndrome? No  Do you have a serious allergy to any of the following: Neomycin, Polymyxin, Thimerosal, eggs or egg products? No    Flu vaccine given per order. Please see immunization tab. Risks and benefits explained. Current VIS given.       Immunizations Administered     Name Date Dose Route    Influenza, Quadv, adjuvanted, 65 yrs +, IM, PF (Fluad) 9/14/2020 0.5 mL Intramuscular    Site: Deltoid- Left    Lot: [id number]    NDC: 26044-106-21

## 2020-09-14 NOTE — TELEPHONE ENCOUNTER
Brunilda Hastings is calling to request a refill on the following medication(s):    Last Visit Date (If Applicable):  1/47/4179    Next Visit Date:    9/14/2020    Medication Request:  Requested Prescriptions     Pending Prescriptions Disp Refills    diclofenac (VOLTAREN) 50 MG EC tablet [Pharmacy Med Name: DICLOFENAC SOD DR 50 MG TAB] 15 tablet 0     Sig: TAKE ONE TABLET BY MOUTH THREE TIMES A DAY

## 2020-09-14 NOTE — PROGRESS NOTES
Medicare Annual Wellness Visit  Name: Charanjit Meléndez Date: 2020   MRN: [id number] Sex: Female   Age: 67 y.o. Ethnicity: /   : 1945 Race: Other      Ashlee Lorenzo is here for 3 Month Follow-Up and Medicare AWV    Screenings for behavioral, psychosocial and functional/safety risks, and cognitive dysfunction are all negative except as indicated below. These results, as well as other patient data from the [address] form, are documented in Flowsheets linked to this Encounter. No Known Allergies      Prior to Visit Medications    Medication Sig Taking? Authorizing Provider   sertraline (ZOLOFT) 25 MG tablet Take 1 tablet by mouth daily Yes Robetra Anand MD   tiZANidine (ZANAFLEX) 2 MG tablet TAKE ONE TABLET BY MOUTH EVERY 8 HOURS AS NEEDED Yes Roberta Anand MD   traMADol (ULTRAM) 50 MG tablet TAKE ONE TABLET BY MOUTH TWICE A DAY.  *REDUCE DOSES TAKEN AS PAIN BECOMES MANAGEABLE* Yes Roberta Anand MD   ASPIRIN ADULT LOW STRENGTH 81 MG EC tablet TAKE ONE TABLET BY MOUTH DAILY Yes Roberta Anand MD   calcium carbonate 1500 (600 Ca) MG TABS tablet TAKE ONE TABLET BY MOUTH TWICE A DAY WITH VITAMIN D3 500MG Yes Roberta Anand MD   CALCIUM PLUS VITAMIN D3 600-500 MG-UNIT CAPS TAKE 1 CAPSULE BY MOUTH TWICE A DAY Yes Roberta Anand MD   diclofenac (VOLTAREN) 50 MG EC tablet Take 1 tablet by mouth daily Yes Roberta Anand MD   diclofenac (VOLTAREN) 50 MG EC tablet TAKE ONE TABLET BY MOUTH THREE TIMES A DAY Yes Roberta Anand MD   traZODone (DESYREL) 50 MG tablet Take 1 tablet by mouth nightly Yes Roberta Anand MD   cimetidine (TAGAMET) 800 MG tablet Take 1 tablet by mouth 2 times daily Yes Roberta Anand MD   Probiotic Acidophilus (FLORANEX) TABS Take 1 tablet by mouth daily Yes Roberta Anand MD   naloxone 4 MG/0.1ML LIQD nasal spray 1 spray by Nasal route as needed for Opioid Reversal Yes Roberta Anand MD   naloxone 4 MG/0.1ML LIQD nasal spray 1 spray by Nasal route as needed for Opioid Reversal Yes Gudelia Gibbons MD   Lactobacillus (ACIDOPHILUS) CAPS capsule TAKE 1 CAPSULE EVERY DAY Yes Historical Provider, MD   chlorhexidine (PERIDEX) 0.12 % solution Take 15 mLs by mouth 2 times daily Yes Historical Provider, MD   zoster recombinant adjuvanted vaccine (SHINGRIX) 50 MCG/0.5ML SUSR injection Inject 0.5 mLs into the muscle See Admin Instructions 1 dose now and repeat in 2-6 months Yes Gudelia Gibbons MD   gabapentin (NEURONTIN) 800 MG tablet Take 1 tablet by mouth 3 times daily for 30 days. Gudelia Gibbons MD         Past Medical History:   Diagnosis Date    Acid reflux     Shingles        Past Surgical History:   Procedure Laterality Date    APPENDECTOMY      CHOLECYSTECTOMY      HYSTERECTOMY         No family history on file. CareTeam (Including outside providers/suppliers regularly involved in providing care):   Patient Care Team:  Gudelia Gibbons MD as PCP - General (Family Medicine)  Gudelia Gibbons MD as PCP - [facility] Empaneled Provider    Wt Readings from Last 3 Encounters:   09/14/20 119 lb 3.2 oz (54.1 kg)   06/11/20 122 lb (55.3 kg)   03/11/20 122 lb (55.3 kg)     Vitals:    09/14/20 0952   BP: 116/69   Pulse: 73   Temp: 98.2 °F (36.8 °C)   SpO2: 98%   Weight: 119 lb 3.2 oz (54.1 kg)     Body mass index is 22.52 kg/m². 58-year-old female coming today for physical.  She tells me that she has a lot of stress with her family. She lives with her daughter as she has her own room but she states that she has to lock up everything because otherwise it gets stolen. She tells me that her credit cards just been removed and then she got them back and there were charges on it over $400. She tells me that she probably will get her own apartment if she is safer. 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     Based upon direct observation of the patient, evaluation of cognition reveals No  Cognitive Impairment Interventions:  · able to recall 3 words    General Health:  General  In general, how would you say your health is?: Good  In the past 7 days, have you experienced any of the following? New or Increased Pain, New or Increased Fatigue, Loneliness, Social Isolation, Stress or Anger?: (!) Stress  Do you get the social and emotional support that you need?: (!) No(family does not help her)  Do you have a Living Will?: Yes  General Health Risk Interventions:  · Stress: patient declines any further evaluation/treatment for this issue, pt wants to get herself a appartment    Personalized Preventive Plan   Current Health Maintenance Status  Immunization History   Administered Date(s) Administered    Influenza, Quadv, 6 mo and older, IM, PF (Flulaval, Fluarix) 10/15/2018    Influenza, Quadv, adjuvanted, 65 yrs +, IM, PF (Fluad) 09/14/2020    Influenza, Triv, inactivated, subunit, adjuvanted, IM (Fluad 65 yrs and older) 10/17/2017, 11/13/2019    Pneumococcal Conjugate 13-valent (Konrad Salle) 10/15/2018, 03/14/2020    Pneumococcal Polysaccharide ([id number]) 12/09/2019    Zoster Recombinant (Shingrix) 07/18/2019, 03/14/2020        Health Maintenance   Topic Date Due    DTaP/Tdap/Td vaccine (1 - Tdap) 04/26/1964    Annual Wellness Visit (AWV)  03/12/2021    DEXA (modify frequency per FRAX score)  Completed    Flu vaccine  Completed    Shingles Vaccine  Completed    Pneumococcal 65+ years Vaccine  Completed    Hepatitis A vaccine  Aged Out    Hepatitis B vaccine  Aged Out    Hib vaccine  Aged Out    Meningococcal (ACWY) vaccine  Aged Out     Recommendations for [employer] (MXD3D) Due: see orders and patient instructions/AVS.  . Recommended screening schedule for the next 5-10 years is provided to the patient in written form: see Patient Instructions/AVS.    Jeffrey Wong was seen today for 3 month follow-up and medicare awv.     Diagnoses and all orders for this visit:    Routine general medical examination at a health care facility  -     CBC Auto Differential; Future  -     Urinalysis; Future  -     TSH with Reflex; Future    Need for influenza vaccination  -     INFLUENZA, QUADV, ADJUVANTED, 72 YRS =, IM, PF, PREFILL SYR, 0.5ML (FLUAD)    Screening for cardiovascular condition  -     Lipid Panel; Future    Screening for diabetes mellitus (DM)    Screening for hyperlipidemia  -     Comprehensive Metabolic Panel; Future    Dysthymia  -     sertraline (ZOLOFT) 25 MG tablet; Take 1 tablet by mouth daily        Again the patient is doing well overall. Yearly blood work was ordered. She did get a flu shot and also Prevnar at [employer] [facility] She would like to restart Zoloft. 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 Call or return to clinic prn if these symptoms worsen or fail to improve as anticipated. I have reviewed the instructions with the patient, answering all questions to her satisfaction.       (Please note that portions of this note were completed with a voice recognition program. Efforts were made to edit the dictations but occasionally words are mis-transcribed.)

## 2020-09-14 NOTE — PATIENT INSTRUCTIONS
Personalized Preventive Plan for Nohemy Hawkins - 9/14/2020  Medicare offers a range of preventive health benefits. Some of the tests and screenings are paid in full while other may be subject to a deductible, co-insurance, and/or copay. Some of these benefits include a comprehensive review of your medical history including lifestyle, illnesses that may run in your family, and various assessments and screenings as appropriate. After reviewing your medical record and screening and assessments performed today your provider may have ordered immunizations, labs, imaging, and/or referrals for you. A list of these orders (if applicable) as well as your Preventive Care list are included within your After Visit Summary for your review. Other Preventive Recommendations:    · A preventive eye exam performed by an eye specialist is recommended every 1-2 years to screen for glaucoma; cataracts, macular degeneration, and other eye disorders. · A preventive dental visit is recommended every 6 months. · Try to get at least 150 minutes of exercise per week or 10,000 steps per day on a pedometer . · Order or download the FREE \"Exercise & Physical Activity: Your Everyday Guide\" from [employer]. Call 6-479.614.3098 or search [employer] online. · You need 2050-9236 mg of calcium and 1165-7257 IU of vitamin D per day. It is possible to meet your calcium requirement with diet alone, but a vitamin D supplement is usually necessary to meet this goal.  · When exposed to the sun, use a sunscreen that protects against both UVA and UVB radiation with an SPF of 30 or greater. Reapply every 2 to 3 hours or after sweating, drying off with a towel, or swimming. · Always wear a seat belt when traveling in a car. Always wear a helmet when riding a bicycle or motorcycle. Heart-Healthy Diet   Sodium, Fat, and Cholesterol Controlled Diet       What Is a Heart Healthy Diet?    A heart-healthy diet is one that limits sodium , certain types of fat , and cholesterol . This type of diet is recommended for:   People with any form of cardiovascular disease (eg, coronary heart disease , peripheral vascular disease , previous heart attack , previous stroke )   People with risk factors for cardiovascular disease, such as high blood pressure , high cholesterol , or diabetes   Anyone who wants to lower their risk of developing cardiovascular disease   Sodium    Sodium is a mineral found in many foods. In general, most people consume much more sodium than they need. Diets high in sodium can increase blood pressure and lead to edema (water retention). On a heart-healthy diet, you should consume no more than 2,300 mg (milligrams) of sodium per dayabout the amount in one teaspoon of table salt. The foods highest in sodium include table salt (about 50% sodium), processed foods, convenience foods, and preserved foods. Cholesterol    Cholesterol is a fat-like, waxy substance in your blood. Our bodies make some cholesterol. It is also found in animal products, with the highest amounts in fatty meat, egg yolks, whole milk, cheese, shellfish, and organ meats. On a heart-healthy diet, you should limit your cholesterol intake to less than 200 mg per day. It is normal and important to have some cholesterol in your bloodstream. But too much cholesterol can cause plaque to build up within your arteries, which can eventually lead to a heart attack or stroke. The two types of cholesterol that are most commonly referred to are:   Low-density lipoprotein (LDL) cholesterol  Also known as bad cholesterol, this is the cholesterol that tends to build up along your arteries. Bad cholesterol levels are increased by eating fats that are saturated or hydrogenated. Optimal level of this cholesterol is less than 100. Over 130 starts to get risky for heart disease.    High-density lipoprotein (HDL) cholesterol  Also known as good cholesterol, this type of cholesterol actually carries cholesterol away from your arteries and may, therefore, help lower your risk of having a heart attack. You want this level to be high (ideally greater than 60). It is a risk to have a level less than 40. You can raise this good cholesterol by eating olive oil, canola oil, avocados, or nuts. Exercise raises this level, too. Fat    Fat is calorie dense and packs a lot of calories into a small amount of food. Even though fats should be limited due to their high calorie content, not all fats are bad. In fact, some fats are quite healthful. Fat can be broken down into four main types. The good-for-you fats are:   Monounsaturated fat  found in oils such as olive and canola, avocados, and nuts and natural nut butters; can decrease cholesterol levels, while keeping levels of HDL cholesterol high   Polyunsaturated fat  found in oils such as safflower, sunflower, soybean, corn, and sesame; can decrease total cholesterol and LDL cholesterol   Omega-3 fatty acids  particularly those found in fatty fish (such as salmon, trout, tuna, mackerel, herring, and sardines); can decrease risk of arrhythmias, decrease triglyceride levels, and slightly lower blood pressure   The fats that you want to limit are:   Saturated fat  found in animal products, many fast foods, and a few vegetables; increases total blood cholesterol, including LDL levels   Animal fats that are saturated include: butter, lard, whole-milk dairy products, meat fat, and poultry skin   Vegetable fats that are saturated include: hydrogenated shortening, palm oil, coconut oil, cocoa butter   Hydrogenated or trans fat  found in margarine and vegetable shortening, most shelf stable snack foods, and fried foods; increases LDL and decreases HDL     It is generally recommended that you limit your total fat for the day to less than 30% of your total calories.  If you follow an 1800-calorie heart healthy diet, for example, this would mean 60 grams of fat or less per day. Saturated fat and trans fat in your diet raises your blood cholesterol the most, much more than dietary cholesterol does. For this reason, on a heart-healthy diet, less than 7% of your calories should come from saturated fat and ideally 0% from trans fat. On an 1800-calorie diet, this translates into less than 14 grams of saturated fat per day, leaving 46 grams of fat to come from mono- and polyunsaturated fats.    Food Choices on a Heart Healthy Diet   Food Category   Foods Recommended   Foods to Avoid   Grains   Breads and rolls without salted tops Most dry and cooked cereals Unsalted crackers and breadsticks Low-sodium or homemade breadcrumbs or stuffing All rice and pastas   Breads, rolls, and crackers with salted tops High-fat baked goods (eg, muffins, donuts, pastries) Quick breads, self-rising flour, and biscuit mixes Regular bread crumbs Instant hot cereals Commercially prepared rice, pasta, or stuffing mixes   Vegetables   Most fresh, frozen, and low-sodium canned vegetables Low-sodium and salt-free vegetable juices Canned vegetables if unsalted or rinsed   Regular canned vegetables and juices, including sauerkraut and pickled vegetables Frozen vegetables with sauces Commercially prepared potato and vegetable mixes   Fruits   Most fresh, frozen, and canned fruits All fruit juices   Fruits processed with salt or sodium   Milk   Nonfat or low-fat (1%) milk Nonfat or low-fat yogurt Cottage cheese, low-fat ricotta, cheeses labeled as low-fat and low-sodium   Whole milk Reduced-fat (2%) milk Malted and chocolate milk Full fat yogurt Most cheeses (unless low-fat and low salt) Buttermilk (no more than 1 cup per week)   Meats and Beans   Lean cuts of fresh or frozen beef, veal, lamb, or pork (look for the word loin) Fresh or frozen poultry without the skin Fresh or frozen fish and some shellfish Egg whites and egg substitutes (Limit whole eggs to three per week) Tofu Nuts or seeds (unsalted, dry-roasted), low-sodium peanut butter Dried peas, beans, and lentils   Any smoked, cured, salted, or canned meat, fish, or poultry (including troy, chipped beef, cold cuts, hot dogs, sausages, sardines, and anchovies) Poultry skins Breaded and/or fried fish or meats Canned peas, beans, and lentils Salted nuts   Fats and Oils   Olive oil and canola oil Low-sodium, low-fat salad dressings and mayonnaise   Butter, margarine, coconut and palm oils, troy fat   Snacks, Sweets, and Condiments   Low-sodium or unsalted versions of broths, soups, soy sauce, and condiments Pepper, herbs, and spices; vinegar, lemon, or lime juice Low-fat frozen desserts (yogurt, sherbet, fruit bars) Sugar, cocoa powder, honey, syrup, jam, and preserves Low-fat, trans-fat free cookies, cakes, and pies Zack and animal crackers, fig bars, talat snaps   High-fat desserts Broth, soups, gravies, and sauces, made from instant mixes or other high-sodium ingredients Salted snack foods Canned olives Meat tenderizers, seasoning salt, and most flavored vinegars   Beverages   Low-sodium carbonated beverages Tea and coffee in moderation Soy milk   Commercially softened water   Suggestions   Make whole grains, fruits, and vegetables the base of your diet. Choose heart-healthy fats such as canola, olive, and flaxseed oil, and foods high in heart-healthy fats, such as nuts, seeds, soybeans, tofu, and fish. Eat fish at least twice per week; the fish highest in omega-3 fatty acids and lowest in mercury include salmon, herring, mackerel, sardines, and canned chunk light tuna. If you eat fish less than twice per week or have high triglycerides, talk to your doctor about taking fish oil supplements. Read food labels.    For products low in fat and cholesterol, look for fat free, low-fat, cholesterol free, saturated fat free, and trans fat freeAlso scan the Nutrition Facts Label, which lists saturated fat, trans fat, and cholesterol amounts. For products low in sodium, look for sodium free, very low sodium, low sodium, no added salt, and unsalted   Skip the salt when cooking or at the table; if food needs more flavor, get creative and try out different herbs and spices. Garlic and onion also add substantial flavor to foods. Trim any visible fat off meat and poultry before cooking, and drain the fat off after tran. Use cooking methods that require little or no added fat, such as grilling, boiling, baking, poaching, broiling, roasting, steaming, stir-frying, and sauting. Avoid fast food and convenience food. They tend to be high in saturated and trans fat and have a lot of added salt. Talk to a registered dietitian for individualized diet advice. Last Reviewed: March 2011 Royce Harris MS, MPH, RD   Updated: 3/29/2011   ·     Keep Your Memory Parmjit Paul       Many factors can affect your ability to remembera hectic lifestyle, aging, stress, chronic disease, and certain medicines. But, there are steps you can take to sharpen your mind and help preserve your memory. Challenge Your Brain   Regularly challenging your mind may help keeps it in top shape. Good mental exercises include:   Crossword puzzlesUse a dictionary if you need it; you will learn more that way. Brainteasers Try some! Crafts, such as wood working and sewing   Hobbies, such as gardening and building model airplanes   SocializingVisit old friends or join groups to meet new ones. Reading   Learning a new language   Taking a class, whether it be art history or abe chi   TravelingExperience the food, history, and culture of your destination   Learning to use a computer   Going to museums, the theater, or thought-provoking movies   Changing things in your daily life, such as reversing your pattern in the grocery store or brushing your teeth using your nondominant hand   Use Memory Aids   There is no need to remember every detail on your own.  These memory aids can help:   Calendars and day planners   Electronic organizers to store all sorts of helpful informationThese devices can \"beep\" to remind you of appointments. A book of days to record birthdays, anniversaries, and other occasions that occur on the same date every year   Detailed \"to-do\" lists and strategically placed sticky notes   Quick \"study\" sessionsBefore a gathering, review who will be there so their names will be fresh in your mind. Establish routinesFor example, keep your keys, wallet, and umbrella in the same place all the time or take medicine with your 8:00 AM glass of juice   Live a Healthy Life   Many actions that will keep your body strong will do the same for your mind. For example:   Talk to Your Doctor About Herbs and Supplements    Malnutrition and vitamin deficiencies can impair your mental function. For example, vitamin B12 deficiency can cause a range of symptoms, including confusion. But, what if your nutritional needs are being met? Can herbs and supplements still offer a benefit? Researchers have investigated a range of natural remedies, such as ginkgo , ginseng , and the supplement phosphatidylserine (PS). So far, though, the evidence is inconsistent as to whether these products can improve memory or thinking. If you are interested in taking herbs and supplements, talk to your doctor first because they may interact with other medicines that you are taking. Exercise Regularly    Among the many benefits of regular exercise are increased blood flow to the brain and decreased risk of certain diseases that can interfere with memory function. One study found that even moderate exercise has a beneficial effect. Examples of \"moderate\" exercise include:   Playing 18 holes of golf once a week, without a cart   Playing tennis twice a week   Walking one mile per day   Manage Stress    It can be tough to remember what is important when your mind is cluttered.  Make time for relaxation. Choose activities that calm you down, and make it routine. Manage Chronic Conditions    Side effects of high blood pressure , diabetes, and heart disease can interfere with mental function. Many of the lifestyle steps discussed here can help manage these conditions. Strive to eat a healthy diet, exercise regularly, get stress under control, and follow your doctor's advice for your condition. Minimize Medications    Talk to your doctor about the medicines that you take. Some may be unnecessary. Also, healthy lifestyle habits may lower the need for certain drugs. Last Reviewed: April 2010 Brice Bustamante MD   Updated: 4/13/2010   ·     [address]       As we get older, changes in balance, gait, strength, vision, hearing, and cognition make even the most youthful senior more prone to accidents. Falls are one of the leading health risks for older people. This increased risk of falling is related to:   Aging process (eg, decreased muscle strength, slowed reflexes)   Higher incidence of chronic health problems (eg, arthritis, diabetes) that may limit mobility, agility or sensory awareness   Side effects of medicine (eg, dizziness, blurred vision)especially medicines like prescription pain medicines and drugs used to treat mental health conditions   Depending on the brittleness of your bones, the consequences of a fall can be serious and long lasting. Home Life   Research by the Association of Aging [facility]) shows that some home accidents among older adults can be prevented by making simple lifestyle changes and basic modifications and repairs to the home environment. Here are some lifestyle changes that experts recommend:   Have your hearing and vision checked regularly. Be sure to wear prescription glasses that are right for you. Speak to your doctor or pharmacist about the possible side effects of your medicines. A number of medicines can cause dizziness.    If you have problems with sleep, talk to your doctor. Limit your intake of alcohol. If necessary, use a cane or walker to help maintain your balance. Wear supportive, rubber-soled shoes, even at home. If you live in a region that gets wintry weather, you may want to put special cleats on your shoes to prevent you from slipping on the snow and ice. Exercise regularly to help maintain muscle tone, agility, and balance. Always hold the banister when going up or down stairs. Also, use  bars when getting in or out of the bath or shower, or using the toilet. To avoid dizziness, get up slowly from a lying down position. Sit up first, dangling your legs for a minute or two before rising to a standing position. Overall Home Safety Check   According to the [employer]'s \"Older Consumer Home Safety Checklist,\" it is important to check for potential hazards in each room. And remember, proper lighting is an essential factor in home safety. If you cannot see clearly, you are more likely to fall. Important questions to ask yourself include:   Are lamp, electric, extension, and telephone cords placed out of the flow of traffic and maintained in good condition? Have frayed cords been replaced? Are all small rugs and runners slip resistant? If not, you can secure them to the floor with a special double-sided carpet tape. Are smoke detectors properly locatedone on every floor of your home and one outside of every sleeping area? Are they in good working order? Are batteries replaced at least once a year? Do you have a well-maintained carbon monoxide detector outside every sleeping are in your home? Does your furniture layout leave plenty of space to maneuver between and around chairs, tables, beds, and sofas? Are hallways, stairs and passages between rooms well lit? Can you reach a lamp without getting out of bed? Are floor surfaces well maintained?  Shag rugs, high-pile carpeting, tile floors, and polished wood floors can be particularly slippery. Stairs should always have handrails and be carpeted or fitted with a non-skid tread. Is your telephone easily reachable. Is the cord safely tucked away? Room by Room   According to the Association [employer]ging, bathrooms and shivani are the two most potentially hazardous rooms in your home. In the Kitchen    Be sure your stove is in proper working order and always make sure burners and the oven are off before you go out or go to sleep. Keep pots on the back burners, turn handles away from the front of the stove, and keep stove clean and free of grease build-up. Kitchen ventilation systems and range exhausts should be working properly. Keep flammable objects such as towels and pot holders away from the cooking area except when in use. Make sure kitchen curtains are tied back. Move cords and appliances away from the sink and hot surfaces. If extension cords are needed, install wiring guides so they do not hang over the sink, range, or working areas. Look for coffee pots, kettles and toaster ovens with automatic shut-offs. Keep a mop handy in the kitchen so you can wipe up spills instantly. You should also have a small fire extinguisher. Arrange your kitchen with frequently used items on lower shelves to avoid the need to stand on a stepstool to reach them. Make sure countertops are well-lit to avoid injuries while cutting and preparing food. In the Bathroom    Use a non-slip mat or decals in the tub and shower, since wet, soapy tile or porcelain surfaces are extremely slippery. Make sure bathroom rugs are non-skid or tape them firmly to the floor. Bathtubs should have at least one, preferably two, grab bars, firmly attached to structural supports in the wall. (Do not use built-in soap holders or glass shower doors as grab bars.)    Tub seats fitted with non-slip material on the legs allow you to wash sitting down.  For people with limited mobility, bathtub transfer benches allow you to slide safely into the tub. Raised toilet seats and toilet safety rails are helpful for those with knee or hip problems. In the [facility]    Make sure you use a nightlight and that the area around your bed is clear of potential obstacles. Be careful with electric blankets and never go to sleep with a heating pad, which can cause serious burns even if on a low setting. Use fire-resistant mattress covers and pillows, and NEVER smoke in bed. Keep a phone next to the bed that is programmed to dial 911 at the push of a button. If you have a chronic condition, you may want to sign on with an automatic call-in service. Typically the system includes a small pendant that connects directly to an emergency medical voice-response system. You should also make arrangements to stay in contact with someonefriend, neighbor, family memberon a regular schedule. Fire Prevention   According to the [employer]. (Smoke Alarms for Every) [address] [place] [address], senior citizens are one of the two highest risk groups for death and serious injuries due to residential fires. When cooking, wear short-sleeved items, never a bulky long-sleeved robe. The [facility]'s Safety Checklist for Older Consumers emphasizes the importance of checking basements, garages, workshops and storage areas for fire hazards, such as volatile liquids, piles of old rags or clothing and overloaded circuits. Never smoke in bed or when lying down on a couch or recliner chair. Small portable electric or kerosene heaters are responsible for many home fires and should be used cautiously if at all. If you do use one, be sure to keep them away from flammable materials. In case of fire, make sure you have a pre-established emergency exit plan. Have a professional check your fireplace and other fuel-burning appliances yearly.     Helping Hands   Baby boomers entering the bacon years will continue to see the development of new products to help older adults live safely and independently in spite of age-related changes. Making Life More Livable  , by [employer], lists over 1,000 products for \"living well in the mature years,\" such as bathing and mobility aids, household security devices, ergonomically designed knives and peelers, and faucet valves and knobs for temperature control. Medical supply stores and organizations are good sources of information about products that improve your quality of life and insure your safety.      Last Reviewed: November 2009 Yuni Lobato MD   Updated: 3/7/2011     ·

## 2020-09-15 LAB
ALBUMIN SERPL-MCNC: NORMAL G/DL
ALBUMIN SERPL-MCNC: NORMAL G/DL
ALP BLD-CCNC: NORMAL U/L
ALP BLD-CCNC: NORMAL U/L
ALT SERPL-CCNC: NORMAL U/L
ALT SERPL-CCNC: NORMAL U/L
ANION GAP SERPL CALCULATED.3IONS-SCNC: NORMAL MMOL/L
ANION GAP SERPL CALCULATED.3IONS-SCNC: NORMAL MMOL/L
AST SERPL-CCNC: NORMAL U/L
AST SERPL-CCNC: NORMAL U/L
BASOPHILS ABSOLUTE: NORMAL
BASOPHILS ABSOLUTE: NORMAL
BASOPHILS RELATIVE PERCENT: NORMAL
BASOPHILS RELATIVE PERCENT: NORMAL
BILIRUB SERPL-MCNC: NORMAL MG/DL
BILIRUB SERPL-MCNC: NORMAL MG/DL
BILIRUBIN, URINE: NORMAL
BILIRUBIN, URINE: NORMAL
BLOOD, URINE: NORMAL
BLOOD, URINE: NORMAL
BUN BLDV-MCNC: NORMAL MG/DL
BUN BLDV-MCNC: NORMAL MG/DL
CALCIUM SERPL-MCNC: NORMAL MG/DL
CALCIUM SERPL-MCNC: NORMAL MG/DL
CHLORIDE BLD-SCNC: NORMAL MMOL/L
CHLORIDE BLD-SCNC: NORMAL MMOL/L
CHOLESTEROL, TOTAL: 220 MG/DL
CHOLESTEROL, TOTAL: 220 MG/DL
CHOLESTEROL/HDL RATIO: NORMAL
CHOLESTEROL/HDL RATIO: NORMAL
CLARITY: NORMAL
CLARITY: NORMAL
CO2: NORMAL
CO2: NORMAL
COLOR: NORMAL
COLOR: NORMAL
CREAT SERPL-MCNC: NORMAL MG/DL
CREAT SERPL-MCNC: NORMAL MG/DL
EOSINOPHILS ABSOLUTE: NORMAL
EOSINOPHILS ABSOLUTE: NORMAL
EOSINOPHILS RELATIVE PERCENT: NORMAL
EOSINOPHILS RELATIVE PERCENT: NORMAL
GFR CALCULATED: NORMAL
GFR CALCULATED: NORMAL
GLUCOSE BLD-MCNC: NORMAL MG/DL
GLUCOSE BLD-MCNC: NORMAL MG/DL
GLUCOSE URINE: NORMAL
GLUCOSE URINE: NORMAL
HCT VFR BLD CALC: NORMAL %
HCT VFR BLD CALC: NORMAL %
HDLC SERPL-MCNC: NORMAL MG/DL
HDLC SERPL-MCNC: NORMAL MG/DL
HEMOGLOBIN: NORMAL
HEMOGLOBIN: NORMAL
KETONES, URINE: NORMAL
KETONES, URINE: NORMAL
LDL CHOLESTEROL CALCULATED: NORMAL
LDL CHOLESTEROL CALCULATED: NORMAL
LEUKOCYTE ESTERASE, URINE: NORMAL
LEUKOCYTE ESTERASE, URINE: NORMAL
LYMPHOCYTES ABSOLUTE: NORMAL
LYMPHOCYTES ABSOLUTE: NORMAL
LYMPHOCYTES RELATIVE PERCENT: NORMAL
LYMPHOCYTES RELATIVE PERCENT: NORMAL
MCH RBC QN AUTO: NORMAL PG
MCH RBC QN AUTO: NORMAL PG
MCHC RBC AUTO-ENTMCNC: NORMAL G/DL
MCHC RBC AUTO-ENTMCNC: NORMAL G/DL
MCV RBC AUTO: NORMAL FL
MCV RBC AUTO: NORMAL FL
MONOCYTES ABSOLUTE: NORMAL
MONOCYTES ABSOLUTE: NORMAL
MONOCYTES RELATIVE PERCENT: NORMAL
MONOCYTES RELATIVE PERCENT: NORMAL
NEUTROPHILS ABSOLUTE: NORMAL
NEUTROPHILS ABSOLUTE: NORMAL
NEUTROPHILS RELATIVE PERCENT: NORMAL
NEUTROPHILS RELATIVE PERCENT: NORMAL
NITRITE, URINE: NORMAL
NITRITE, URINE: NORMAL
NONHDLC SERPL-MCNC: NORMAL MG/DL
NONHDLC SERPL-MCNC: NORMAL MG/DL
PDW BLD-RTO: NORMAL %
PDW BLD-RTO: NORMAL %
PH UA: 7 (ref 4.5–8)
PH UA: 7 (ref 4.5–8)
PLATELET # BLD: NORMAL 10*3/UL
PLATELET # BLD: NORMAL 10*3/UL
PMV BLD AUTO: NORMAL FL
PMV BLD AUTO: NORMAL FL
POTASSIUM SERPL-SCNC: NORMAL MMOL/L
POTASSIUM SERPL-SCNC: NORMAL MMOL/L
PROTEIN UA: NORMAL
PROTEIN UA: NORMAL
RBC # BLD: NORMAL 10*6/UL
RBC # BLD: NORMAL 10*6/UL
SODIUM BLD-SCNC: 141 MMOL/L
SODIUM BLD-SCNC: 141 MMOL/L
SPECIFIC GRAVITY, URINE: NORMAL
SPECIFIC GRAVITY, URINE: NORMAL
TOTAL PROTEIN: NORMAL
TOTAL PROTEIN: NORMAL
TRIGL SERPL-MCNC: NORMAL MG/DL
TRIGL SERPL-MCNC: NORMAL MG/DL
TSH SERPL DL<=0.05 MIU/L-ACNC: 2.88 UIU/ML
TSH SERPL DL<=0.05 MIU/L-ACNC: 2.88 UIU/ML
UROBILINOGEN, URINE: NORMAL
UROBILINOGEN, URINE: NORMAL
VLDLC SERPL CALC-MCNC: NORMAL MG/DL
VLDLC SERPL CALC-MCNC: NORMAL MG/DL
WBC # BLD: 4.01 10^3/ML
WBC # BLD: 4.01 10^3/ML

## 2020-09-17 RX ORDER — GABAPENTIN 800 MG/1
800 TABLET ORAL 3 TIMES DAILY
Qty: 90 TABLET | Refills: 1 | Status: SHIPPED | OUTPATIENT
Start: 2020-09-17 | End: 2021-01-21

## 2020-09-17 NOTE — TELEPHONE ENCOUNTER
Lory Soto is calling to request a refill on the following medication(s):    Last Visit Date (If Applicable):  6/17/8386    Next Visit Date:    12/14/2020    Medication Request:  Requested Prescriptions     Pending Prescriptions Disp Refills    diclofenac (VOLTAREN) 50 MG EC tablet 90 tablet 0     Sig: Take 1 tablet by mouth 3 times daily    gabapentin (NEURONTIN) 800 MG tablet 90 tablet 1     Sig: Take 1 tablet by mouth 3 times daily for 30 days.

## 2020-10-06 RX ORDER — IBUPROFEN 200 MG
CAPSULE ORAL
Qty: 60 TABLET | Refills: 0 | Status: SHIPPED | OUTPATIENT
Start: 2020-10-06 | End: 2020-11-18

## 2020-10-06 NOTE — TELEPHONE ENCOUNTER
Lynne Batista is calling to request a refill on the following medication(s):    Last Visit Date (If Applicable):  6/67/0019    Next Visit Date:    12/14/2020    Medication Request:  Requested Prescriptions     Pending Prescriptions Disp Refills    calcium 600 MG TABS tablet [Pharmacy Med Name: CALCIUM 600 MG TABLET] 60 tablet 0     Sig: TAKE ONE BY MOUTH TWICE A DAY WITH VITAMIN D3 500MG

## 2020-10-22 RX ORDER — ASPIRIN 81 MG/1
TABLET ORAL
Qty: 30 TABLET | Refills: 0 | Status: SHIPPED | OUTPATIENT
Start: 2020-10-22 | End: 2020-11-18

## 2020-10-22 NOTE — TELEPHONE ENCOUNTER
Nabor Kennedy is calling to request a refill on the following medication(s):    Last Visit Date (If Applicable):  2/06/0812    Next Visit Date:    12/14/2020    Medication Request:  Requested Prescriptions     Pending Prescriptions Disp Refills    ASPIRIN ADULT LOW STRENGTH 81 MG EC tablet [Pharmacy Med Name: ASPIRIN EC 81 MG TABLET] 30 tablet 0     Sig: TAKE ONE TABLET BY MOUTH DAILY

## 2020-10-26 RX ORDER — TRAMADOL HYDROCHLORIDE 50 MG/1
TABLET ORAL
Qty: 60 TABLET | Refills: 0 | Status: SHIPPED | OUTPATIENT
Start: 2020-10-26 | End: 2020-11-18

## 2020-10-26 RX ORDER — TIZANIDINE 2 MG/1
TABLET ORAL
Qty: 90 TABLET | Refills: 0 | Status: SHIPPED | OUTPATIENT
Start: 2020-10-26 | End: 2020-11-18

## 2020-10-26 NOTE — TELEPHONE ENCOUNTER
Stephanie Lancaster is calling to request a refill on the following medication(s):    Last Visit Date (If Applicable):  0/89/4858    Next Visit Date:    12/14/2020    Medication Request:  Requested Prescriptions     Pending Prescriptions Disp Refills    tiZANidine (ZANAFLEX) 2 MG tablet [Pharmacy Med Name: tiZANidine HCL 2MG TABLET] 90 tablet 0     Sig: TAKE ONE TABLET BY MOUTH EVERY 8 HOURS AS NEEDED    traMADol (ULTRAM) 50 MG tablet [Pharmacy Med Name: traMADol HCL 50MG TABLET] 60 tablet 0     Sig: TAKE ONE TABLET BY MOUTH TWICE A DAY **REDUCE DOSE TAKEN AS PAIN BECOMES MANAGEABLE**    diclofenac (VOLTAREN) 50 MG EC tablet [Pharmacy Med Name: DICLOFENAC SOD DR 50 MG TAB] 15 tablet 0     Sig: TAKE ONE TABLET BY MOUTH THREE TIMES A DAY

## 2020-12-14 ENCOUNTER — OFFICE VISIT (OUTPATIENT)
Dept: FAMILY MEDICINE CLINIC | Age: 75
End: 2020-12-14
Payer: COMMERCIAL

## 2020-12-14 ENCOUNTER — HOSPITAL ENCOUNTER (OUTPATIENT)
Age: 75
Setting detail: SPECIMEN
Discharge: HOME | End: 2020-12-14
Payer: COMMERCIAL

## 2020-12-14 VITALS
TEMPERATURE: 97 F | HEART RATE: 81 BPM | SYSTOLIC BLOOD PRESSURE: 115 MMHG | WEIGHT: 119 LBS | DIASTOLIC BLOOD PRESSURE: 63 MMHG | BODY MASS INDEX: 22.48 KG/M2 | OXYGEN SATURATION: 96 %

## 2020-12-14 PROCEDURE — 99213 OFFICE O/P EST LOW 20 MIN: CPT | Performed by: FAMILY MEDICINE

## 2020-12-14 RX ORDER — PHENOL 1.4 %
AEROSOL, SPRAY (ML) MUCOUS MEMBRANE
Qty: 60 TABLET | Refills: 5 | Status: SHIPPED | OUTPATIENT
Start: 2020-12-14 | End: 2021-07-13 | Stop reason: SDUPTHER

## 2020-12-14 ASSESSMENT — PATIENT HEALTH QUESTIONNAIRE - PHQ9
SUM OF ALL RESPONSES TO PHQ QUESTIONS 1-9: 0
1. LITTLE INTEREST OR PLEASURE IN DOING THINGS: 0
2. FEELING DOWN, DEPRESSED OR HOPELESS: 0
SUM OF ALL RESPONSES TO PHQ9 QUESTIONS 1 & 2: 0
SUM OF ALL RESPONSES TO PHQ QUESTIONS 1-9: 0
SUM OF ALL RESPONSES TO PHQ QUESTIONS 1-9: 0

## 2020-12-14 NOTE — PROGRESS NOTES
General FM note    Jacky Virgen is a 67 y.o. female who presents today for follow up on her  medical conditions as noted below. Jacky Virgen is c/o of   Chief Complaint   Patient presents with    3 Month Follow-Up       Patient Active Problem List:     Colitis     Proctitis     Diarrhea of presumed infectious origin     Left ovarian cyst     Recurrent colitis due to Clostridium difficile     Enterocolitis     Primary insomnia     Asymptomatic menopausal state      Past Medical History:   Diagnosis Date    Acid reflux     Shingles       Past Surgical History:   Procedure Laterality Date    APPENDECTOMY      CHOLECYSTECTOMY      HYSTERECTOMY       No family history on file. Current Outpatient Medications   Medication Sig Dispense Refill    calcium carbonate 600 MG TABS tablet TAKE ONE TABLET BY MOUTH TWICE A DAY WITH VITAMIN D3 500 MG. 60 tablet 5    ASPIRIN ADULT LOW STRENGTH 81 MG EC tablet TAKE ONE TABLET BY MOUTH DAILY 30 tablet 0    traMADol (ULTRAM) 50 MG tablet Take 1 tablet by mouth 2 times daily for 30 days. 60 tablet 0    tiZANidine (ZANAFLEX) 2 MG tablet TAKE ONE TABLET BY MOUTH EVERY 8 HOURS AS NEEDED 90 tablet 0    diclofenac (VOLTAREN) 50 MG EC tablet Take 1 tablet by mouth daily 90 tablet 0    diclofenac (VOLTAREN) 50 MG EC tablet Take 1 tablet by mouth 3 times a day 90 tablet 0    gabapentin (NEURONTIN) 800 MG tablet Take 1 tablet by mouth 3 times daily for 30 days. 90 tablet 1    sertraline (ZOLOFT) 25 MG tablet Take 1 tablet by mouth daily (Patient not taking: Reported on 12/14/2020) 30 tablet 3    CALCIUM PLUS VITAMIN D3 600-500 MG-UNIT CAPS TAKE 1 CAPSULE BY MOUTH TWICE A DAY 60 capsule 1    gabapentin (NEURONTIN) 800 MG tablet Take 1 tablet by mouth 3 times daily for 30 days.  90 tablet 3    traZODone (DESYREL) 50 MG tablet Take 1 tablet by mouth nightly (Patient not taking: Reported on 12/14/2020) 30 tablet 3    cimetidine (TAGAMET) 800 MG tablet Take 1 tablet by mouth 2 times daily (Patient not taking: Reported on 12/14/2020) 30 tablet 3    Probiotic Acidophilus (FLORANEX) TABS Take 1 tablet by mouth daily (Patient not taking: Reported on 12/14/2020) 30 tablet 5    naloxone 4 MG/0.1ML LIQD nasal spray 1 spray by Nasal route as needed for Opioid Reversal (Patient not taking: Reported on 12/14/2020) 1 each 5    naloxone 4 MG/0.1ML LIQD nasal spray 1 spray by Nasal route as needed for Opioid Reversal (Patient not taking: Reported on 12/14/2020) 1 each 5    Lactobacillus (ACIDOPHILUS) CAPS capsule TAKE 1 CAPSULE EVERY DAY  5    chlorhexidine (PERIDEX) 0.12 % solution Take 15 mLs by mouth 2 times daily      zoster recombinant adjuvanted vaccine (SHINGRIX) 50 MCG/0.5ML SUSR injection Inject 0.5 mLs into the muscle See Admin Instructions 1 dose now and repeat in 2-6 months 0.5 mL 1     No current facility-administered medications for this visit. ALLERGIES:  No Known Allergies    Social History     Tobacco Use    Smoking status: Never Smoker    Smokeless tobacco: Never Used   Substance Use Topics    Alcohol use: No      Body mass index is 22.48 kg/m². /63   Pulse 81   Temp 97 °F (36.1 °C)   Wt 119 lb (54 kg)   SpO2 96%   BMI 22.48 kg/m²     Subjective:      HPI    66 yo female she is doing well overall. She really does not have a lot of concerns. She feels her family also is doing well currently. Has been taking her tramadol as prescribed. Twice a day. Complains about a L arm pain here and there stays there for some hrs. She really cannot tell me when it really started she denies any injuries any bumps or any lumps today. She states at times it is there and then it is not there for some days. She has been using Tylenol for arthritis which has helped. Review of Systems   Constitutional: Negative for fever and unexpected weight change. Respiratory: Negative for cough and shortness of breath.     Cardiovascular: Negative for chest pain and leg swelling. Gastrointestinal: Negative for diarrhea, constipation and blood in stool. Musculoskeletal: Negative for back pain and gait problem. Positive for left upper arm discomfort at times. Skin: Negative for color change and rash. Neurological: Negative for dizziness and headaches. Psychiatric/Behavioral: Negative for confusion and agitation. Objective:   Physical Exam  Constitutional: VS (see above). General appearance: normal development, habitus and attention, no deformities. No distress. Eyes: normal conjunctiva and lids. CAV: RRR, no RMG. No edema lower extremities. Pulmo: CTA bilateral, no CWR. Skin: no rashes, lesions or ulcers. Musculoskeletal: normal gait. Nails: no clubbing or cyanosis. Psychiatric: alert and oriented to place, time and person. Normal mood and affect. Assessment:       Diagnosis Orders   1. Post herpetic neuralgia  Pain Management Drug Screen   2. Asymptomatic menopause  calcium carbonate 600 MG TABS tablet   3. Low serum calcium  calcium carbonate 600 MG TABS tablet       Plan:   Overall the patient is doing quite well. Urine drug screen ordered today. I discussed with her to watch his arm discomfort if this gets worse let me know. Return in about 3 months (around 3/14/2021), or if symptoms worsen or fail to improve. Orders Placed This Encounter   Procedures    Pain Management Drug Screen     Standing Status:   Future     Number of Occurrences:   1     Standing Expiration Date:   12/14/2021     Orders Placed This Encounter   Medications    calcium carbonate 600 MG TABS tablet     Sig: TAKE ONE TABLET BY MOUTH TWICE A DAY WITH VITAMIN D3 500 MG. Dispense:  60 tablet     Refill:  5       Call or return to clinic prn if these symptoms worsen or fail to improve as anticipated. I have reviewed the instructions with the patient, answering all questions to her satisfaction.       Marjorie Matute received counseling on the following healthy behaviors: nutrition,

## 2020-12-17 LAB

## 2020-12-17 RX ORDER — ASPIRIN 81 MG/1
TABLET ORAL
Qty: 30 TABLET | Refills: 0 | Status: SHIPPED | OUTPATIENT
Start: 2020-12-17 | End: 2021-01-12

## 2020-12-17 NOTE — TELEPHONE ENCOUNTER
Pillo Boswell is calling to request a refill on the following medication(s):    Last Visit Date (If Applicable):  80/98/7225    Next Visit Date:    3/15/2021    Medication Request:  Requested Prescriptions     Pending Prescriptions Disp Refills    ASPIRIN ADULT LOW STRENGTH 81 MG EC tablet [Pharmacy Med Name: ASPIRIN EC 81 MG TABLET] 30 tablet 0     Sig: TAKE ONE TABLET BY MOUTH DAILY

## 2020-12-21 RX ORDER — TRAMADOL HYDROCHLORIDE 50 MG/1
TABLET ORAL
Qty: 60 TABLET | Refills: 0 | Status: SHIPPED | OUTPATIENT
Start: 2020-12-21 | End: 2021-01-20

## 2020-12-21 RX ORDER — TRAMADOL HYDROCHLORIDE 50 MG/1
TABLET ORAL
Qty: 60 TABLET | Refills: 0 | Status: CANCELLED | OUTPATIENT
Start: 2020-12-21

## 2020-12-21 NOTE — TELEPHONE ENCOUNTER
Yahir Andersen is calling to request a refill on the following medication(s):    Last Visit Date (If Applicable):  44/53/3478    Next Visit Date:    3/15/2021    Medication Request:  Requested Prescriptions     Pending Prescriptions Disp Refills    traMADol (ULTRAM) 50 MG tablet [Pharmacy Med Name: traMADol HCL 50MG TABLET] 60 tablet 0     Sig: TAKE ONE TABLET BY MOUTH TWICE A DAY *REDUCE DOSE TAKEN AS PAIN BECOMES MANAGEABLE*

## 2021-01-11 DIAGNOSIS — F34.1 DYSTHYMIA: ICD-10-CM

## 2021-01-11 RX ORDER — SERTRALINE HYDROCHLORIDE 25 MG/1
TABLET, FILM COATED ORAL
Qty: 30 TABLET | Refills: 2 | Status: SHIPPED | OUTPATIENT
Start: 2021-01-11 | End: 2021-04-13

## 2021-01-12 RX ORDER — ASPIRIN 81 MG/1
TABLET ORAL
Qty: 30 TABLET | Refills: 0 | Status: SHIPPED | OUTPATIENT
Start: 2021-01-12 | End: 2021-02-12

## 2021-01-12 NOTE — TELEPHONE ENCOUNTER
Anurag Britt is calling to request a refill on the following medication(s):    Last Visit Date (If Applicable):  54/15/7579    Next Visit Date:    3/15/2021    Medication Request:  Requested Prescriptions     Pending Prescriptions Disp Refills    ASPIRIN ADULT LOW STRENGTH 81 MG EC tablet [Pharmacy Med Name: ASPIRIN EC 81 MG TABLET] 30 tablet 0     Sig: TAKE ONE TABLET BY MOUTH DAILY

## 2021-01-14 NOTE — TELEPHONE ENCOUNTER
Judith Killian is calling to request a refill on the following medication(s):    Last Visit Date (If Applicable):  43/72/7111    Next Visit Date:    3/15/2021    Medication Request:  Requested Prescriptions     Pending Prescriptions Disp Refills    diclofenac (VOLTAREN) 50 MG EC tablet [Pharmacy Med Name: DICLOFENAC SOD DR 50 MG TAB] 90 tablet 0     Sig: TAKE ONE TABLET BY MOUTH THREE TIMES A DAY

## 2021-01-21 DIAGNOSIS — B02.29 POST HERPETIC NEURALGIA: ICD-10-CM

## 2021-01-21 RX ORDER — GABAPENTIN 800 MG/1
TABLET ORAL
Qty: 90 TABLET | Refills: 0 | Status: SHIPPED | OUTPATIENT
Start: 2021-01-21 | End: 2021-02-17

## 2021-01-21 NOTE — TELEPHONE ENCOUNTER
Anurag Britt is calling to request a refill on the following medication(s):    Last Visit Date (If Applicable):  85/02/5981    Next Visit Date:    3/15/2021    Medication Request:  Requested Prescriptions     Pending Prescriptions Disp Refills    gabapentin (NEURONTIN) 800 MG tablet [Pharmacy Med Name: GABAPENTIN 800 MG TABLET] 90 tablet 0     Sig: TAKE ONE TABLET BY MOUTH THREE TIMES A DAY

## 2021-02-12 RX ORDER — ASPIRIN 81 MG/1
TABLET ORAL
Qty: 30 TABLET | Refills: 0 | Status: SHIPPED | OUTPATIENT
Start: 2021-02-12 | End: 2021-03-16

## 2021-02-16 DIAGNOSIS — B02.29 POST HERPETIC NEURALGIA: ICD-10-CM

## 2021-02-17 RX ORDER — TRAMADOL HYDROCHLORIDE 50 MG/1
TABLET ORAL
Qty: 60 TABLET | Refills: 0 | Status: SHIPPED | OUTPATIENT
Start: 2021-02-17 | End: 2021-03-17

## 2021-02-17 RX ORDER — TRAMADOL HYDROCHLORIDE 50 MG/1
TABLET ORAL
Qty: 60 TABLET | Refills: 0 | OUTPATIENT
Start: 2021-02-17

## 2021-02-17 RX ORDER — GABAPENTIN 800 MG/1
TABLET ORAL
Qty: 90 TABLET | Refills: 0 | Status: SHIPPED | OUTPATIENT
Start: 2021-02-17 | End: 2021-02-19 | Stop reason: SDUPTHER

## 2021-02-19 DIAGNOSIS — B02.29 POST HERPETIC NEURALGIA: ICD-10-CM

## 2021-02-19 RX ORDER — TIZANIDINE 2 MG/1
TABLET ORAL
Qty: 90 TABLET | Refills: 0 | Status: SHIPPED | OUTPATIENT
Start: 2021-02-19 | End: 2021-04-20

## 2021-02-19 RX ORDER — GABAPENTIN 800 MG/1
800 TABLET ORAL 3 TIMES DAILY
Qty: 90 TABLET | Refills: 3 | Status: SHIPPED | OUTPATIENT
Start: 2021-02-19 | End: 2021-06-21 | Stop reason: SDUPTHER

## 2021-03-15 ENCOUNTER — OFFICE VISIT (OUTPATIENT)
Dept: FAMILY MEDICINE CLINIC | Age: 76
End: 2021-03-15
Payer: COMMERCIAL

## 2021-03-15 VITALS
BODY MASS INDEX: 22.62 KG/M2 | SYSTOLIC BLOOD PRESSURE: 138 MMHG | DIASTOLIC BLOOD PRESSURE: 72 MMHG | HEIGHT: 61 IN | HEART RATE: 66 BPM | WEIGHT: 119.8 LBS | TEMPERATURE: 97 F | OXYGEN SATURATION: 99 %

## 2021-03-15 DIAGNOSIS — F51.01 PRIMARY INSOMNIA: ICD-10-CM

## 2021-03-15 DIAGNOSIS — B02.29 POST HERPETIC NEURALGIA: Primary | ICD-10-CM

## 2021-03-15 PROCEDURE — 99213 OFFICE O/P EST LOW 20 MIN: CPT | Performed by: FAMILY MEDICINE

## 2021-03-15 RX ORDER — LANOLIN ALCOHOL/MO/W.PET/CERES
3 CREAM (GRAM) TOPICAL DAILY
Qty: 30 TABLET | Refills: 3 | Status: SHIPPED | OUTPATIENT
Start: 2021-03-15 | End: 2021-06-21 | Stop reason: SDUPTHER

## 2021-03-15 ASSESSMENT — PATIENT HEALTH QUESTIONNAIRE - PHQ9
SUM OF ALL RESPONSES TO PHQ9 QUESTIONS 1 & 2: 0
2. FEELING DOWN, DEPRESSED OR HOPELESS: 0

## 2021-03-15 NOTE — PROGRESS NOTES
General FM note    Adalid Lambert is a 67 y.o. female who presents today for follow up on her  medical conditions as noted below. Adalid Lambert is c/o of   Chief Complaint   Patient presents with    3 Month Follow-Up       Patient Active Problem List:     Colitis     Proctitis     Diarrhea of presumed infectious origin     Left ovarian cyst     Recurrent colitis due to Clostridium difficile     Enterocolitis     Primary insomnia     Asymptomatic menopausal state      Past Medical History:   Diagnosis Date    Acid reflux     Shingles       Past Surgical History:   Procedure Laterality Date    APPENDECTOMY      CHOLECYSTECTOMY      HYSTERECTOMY       No family history on file. Current Outpatient Medications   Medication Sig Dispense Refill    melatonin 3 MG TABS tablet Take 1 tablet by mouth daily 30 tablet 3    diclofenac (VOLTAREN) 50 MG EC tablet TAKE ONE TABLET BY MOUTH THREE TIMES A DAY 90 tablet 0    tiZANidine (ZANAFLEX) 2 MG tablet TAKE ONE TABLET BY MOUTH EVERY 8 HOURS AS NEEDED 90 tablet 0    gabapentin (NEURONTIN) 800 MG tablet Take 1 tablet by mouth 3 times daily for 30 days. 90 tablet 3    traMADol (ULTRAM) 50 MG tablet TAKE ONE TABLET BY MOUTH TWICE A DAY. REDUCE DOSES TAKEN AS PAIN BECOMES MANAGEABLE 60 tablet 0    ASPIRIN ADULT LOW STRENGTH 81 MG EC tablet TAKE ONE TABLET BY MOUTH DAILY 30 tablet 0    sertraline (ZOLOFT) 25 MG tablet TAKE ONE TABLET BY MOUTH DAILY 30 tablet 2    calcium carbonate 600 MG TABS tablet TAKE ONE TABLET BY MOUTH TWICE A DAY WITH VITAMIN D3 500 MG.  60 tablet 5    CALCIUM PLUS VITAMIN D3 600-500 MG-UNIT CAPS TAKE 1 CAPSULE BY MOUTH TWICE A DAY 60 capsule 1    diclofenac (VOLTAREN) 50 MG EC tablet Take 1 tablet by mouth daily 90 tablet 0    Lactobacillus (ACIDOPHILUS) CAPS capsule TAKE 1 CAPSULE EVERY DAY  5    chlorhexidine (PERIDEX) 0.12 % solution Take 15 mLs by mouth 2 times daily      zoster recombinant adjuvanted vaccine [facility]) 50 MCG/0.5ML SUSR injection Inject 0.5 mLs into the muscle See Admin Instructions 1 dose now and repeat in 2-6 months 0.5 mL 1    traZODone (DESYREL) 50 MG tablet Take 1 tablet by mouth nightly (Patient not taking: Reported on 12/14/2020) 30 tablet 3    cimetidine (TAGAMET) 800 MG tablet Take 1 tablet by mouth 2 times daily (Patient not taking: Reported on 12/14/2020) 30 tablet 3    Probiotic Acidophilus (FLORANEX) TABS Take 1 tablet by mouth daily (Patient not taking: Reported on 12/14/2020) 30 tablet 5    naloxone 4 MG/0.1ML LIQD nasal spray 1 spray by Nasal route as needed for Opioid Reversal (Patient not taking: Reported on 12/14/2020) 1 each 5    naloxone 4 MG/0.1ML LIQD nasal spray 1 spray by Nasal route as needed for Opioid Reversal (Patient not taking: Reported on 12/14/2020) 1 each 5     No current facility-administered medications for this visit. ALLERGIES:  No Known Allergies    Social History     Tobacco Use    Smoking status: Never Smoker    Smokeless tobacco: Never Used   Substance Use Topics    Alcohol use: No      Body mass index is 22.64 kg/m². /72   Pulse 66   Temp 97 °F (36.1 °C)   Ht 5' 1\" (1.549 m)   Wt 119 lb 12.8 oz (54.3 kg)   SpO2 99%   BMI 22.64 kg/m²     Subjective:      HPI    68 yo female coming today for follow-up of her tramadol. She states that she has been taking it daily and is helping her post herpetic pain. Complains about trouble sleeping for one months. She can go 3 days with out sleeping. Goes to bed 8-830 PM - not able to fall asleep. Feels that she does not sleep at all. Then she gets up in the middle of the night and then she sees at 3 AM already and so she tries to sleep a little bit more. Up in the morning 850 AM.  Patient has not tried any medications over-the-counter. She states previously she was provided sleep medication. Review of Systems   Constitutional: Negative for fever and unexpected weight change.    Pertinent items are noted in HPI. Objective:   Physical Exam  Constitutional: VS (see above). General appearance: normal development, habitus and attention, no deformities. No distress. Eyes: normal conjunctiva and lids. CAV: RRR, no RMG. No edema lower extremities. Pulmo: CTA bilateral, no CWR. Skin: no rashes, lesions or ulcers. Musculoskeletal: normal gait. Nails: no clubbing or cyanosis. Psychiatric: alert and oriented to place, time and person. Normal mood and affect. Assessment:       Diagnosis Orders   1. Post herpetic neuralgia     2. Primary insomnia         Plan:   Continue current care with tramadol. Discussed with patient to use melatonin first see if it helps if it does not help the patient with me know. Return in about 3 months (around 6/15/2021), or if symptoms worsen or fail to improve. No orders of the defined types were placed in this encounter. Orders Placed This Encounter   Medications    melatonin 3 MG TABS tablet     Sig: Take 1 tablet by mouth daily     Dispense:  30 tablet     Refill:  3       Call or return to clinic prn if these symptoms worsen or fail to improve as anticipated. I have reviewed the instructions with the patient, answering all questions to her satisfaction. Sonya Causey received counseling on the following healthy behaviors: nutrition, exercise and medication adherence  Reviewed prior labs and health maintenance. Continue current medications, diet and exercise. Discussed use, benefit, and side effects of prescribed medications. Barriers to medication compliance addressed. Patient given educational materials - see patient instructions. All patient questions answered. Patient voiced understanding.       Electronically signed by Digna Shannon MD on 3/15/2021 at 10:57 AM       (Please note that portions of this note were completed with a voice recognition program. Efforts were made to edit the dictations but occasionally words are mis-transcribed.)

## 2021-03-16 RX ORDER — ASPIRIN 81 MG/1
TABLET ORAL
Qty: 30 TABLET | Refills: 0 | Status: SHIPPED | OUTPATIENT
Start: 2021-03-16 | End: 2021-04-12

## 2021-04-13 DIAGNOSIS — F34.1 DYSTHYMIA: ICD-10-CM

## 2021-04-13 RX ORDER — SERTRALINE HYDROCHLORIDE 25 MG/1
TABLET, FILM COATED ORAL
Qty: 30 TABLET | Refills: 1 | Status: SHIPPED | OUTPATIENT
Start: 2021-04-13 | End: 2021-05-24

## 2021-05-24 ENCOUNTER — TELEPHONE (OUTPATIENT)
Dept: FAMILY MEDICINE CLINIC | Age: 76
End: 2021-05-24

## 2021-05-24 DIAGNOSIS — R19.7 DIARRHEA, UNSPECIFIED TYPE: Primary | ICD-10-CM

## 2021-05-28 ENCOUNTER — HOSPITAL ENCOUNTER (OUTPATIENT)
Age: 76
Setting detail: SPECIMEN
Discharge: HOME | End: 2021-05-28
Payer: COMMERCIAL

## 2021-05-28 DIAGNOSIS — R19.7 DIARRHEA, UNSPECIFIED TYPE: ICD-10-CM

## 2021-05-29 LAB
CAMPYLOBACTER PCR: NORMAL
E COLI ENTEROTOXIGENIC PCR: NORMAL
PLESIOMONAS SHIGELLOIDES PCR: NORMAL
SALMONELLA PCR: NORMAL
SHIGATOXIN GENE PCR: NORMAL
SHIGELLA SP PCR: NORMAL
SPECIMEN DESCRIPTION: NORMAL
VIBRIO PCR: NORMAL
YERSINIA ENTEROCOLITICA PCR: NORMAL

## 2021-06-18 DIAGNOSIS — F34.1 DYSTHYMIA: ICD-10-CM

## 2021-06-18 DIAGNOSIS — B02.29 POST HERPETIC NEURALGIA: ICD-10-CM

## 2021-06-18 NOTE — TELEPHONE ENCOUNTER
----- Message from Cecile Rodriguez sent at 6/18/2021  1:22 PM EDT -----  Subject: Refill Request    QUESTIONS  Name of Medication? gabapentin (NEURONTIN) 800 MG tablet  Patient-reported dosage and instructions? na  How many days do you have left? 7  Preferred Pharmacy? [facility]  Pharmacy phone number (if available)? [phone]  ---------------------------------------------------------------------------  --------------,  Name of Medication? sertraline (ZOLOFT) 25 MG tablet  Patient-reported dosage and instructions? na  How many days do you have left? 7  Preferred [facility]acy? [facility]  Pharmacy phone number (if available)? [phone]  ---------------------------------------------------------------------------  --------------,  Name of Medication? tiZANidine (ZANAFLEX) 2 MG tablet  Patient-reported dosage and instructions? 2 mg  How many days do you have left? 7  Preferred [facility]? [facility]  Pharmacy phone number (if available)? [phone]  ---------------------------------------------------------------------------  --------------,  Name of Medication? diclofenac (VOLTAREN) 50 MG EC tablet  Patient-reported dosage and instructions? 50 mg  How many days do you have left? 7  [facility]  Pharmacy phone number (if available)? [phone]  ---------------------------------------------------------------------------  --------------,  Name of Medication? traMADol (ULTRAM) 50 MG tablet  Patient-reported dosage and instructions? 50 mg  How many days do you have left? 7  [facility]  Pharmacy phone number (if available)? [phone]  ---------------------------------------------------------------------------  --------------,  Name of Medication? melatonin 3 MG TABS tablet  Patient-reported dosage and instructions? 3 mg  How many days do you have left? 7  Preferred [facility]acy? [facility]  Pharmacy phone number (if available)? [phone]  ---------------------------------------------------------------------------  --------------,  Name of Medication? ASPIRIN ADULT LOW STRENGTH 81 MG EC tablet  Patient-reported dosage and instructions? 81 mg  How many days do you have left? 7  Preferred Pharmacy? [facility]   Pharmacy phone number (if available)? [phone]  Additional Information for Provider? Pt. is needing a refill before her   next appt. as she runs out of medication on 6/25. Pt. has an appt   scheduled in August but runs out of medication before then. Pt. would like   a call regarding refills   ---------------------------------------------------------------------------  --------------  CALL BACK INFO  What is the best way for the office to contact you? Do not leave any   message, patient will call back for answer  Preferred Call Back Phone Number?  [phone]

## 2021-06-21 RX ORDER — LANOLIN ALCOHOL/MO/W.PET/CERES
3 CREAM (GRAM) TOPICAL DAILY
Qty: 30 TABLET | Refills: 3 | Status: SHIPPED | OUTPATIENT
Start: 2021-06-21 | End: 2021-07-13 | Stop reason: SDUPTHER

## 2021-06-21 RX ORDER — TRAMADOL HYDROCHLORIDE 50 MG/1
50 TABLET ORAL DAILY
Qty: 60 TABLET | Refills: 0 | Status: SHIPPED | OUTPATIENT
Start: 2021-06-21 | End: 2021-07-13 | Stop reason: SDUPTHER

## 2021-06-21 RX ORDER — SERTRALINE HYDROCHLORIDE 25 MG/1
TABLET, FILM COATED ORAL
Qty: 30 TABLET | Refills: 3 | Status: SHIPPED | OUTPATIENT
Start: 2021-06-21 | End: 2021-07-13 | Stop reason: SDUPTHER

## 2021-06-21 RX ORDER — GABAPENTIN 800 MG/1
800 TABLET ORAL 3 TIMES DAILY
Qty: 90 TABLET | Refills: 3 | Status: SHIPPED | OUTPATIENT
Start: 2021-06-21 | End: 2021-07-13 | Stop reason: SDUPTHER

## 2021-06-21 RX ORDER — TIZANIDINE 2 MG/1
TABLET ORAL
Qty: 90 TABLET | Refills: 0 | Status: SHIPPED | OUTPATIENT
Start: 2021-06-21 | End: 2021-07-13 | Stop reason: SDUPTHER

## 2021-06-21 RX ORDER — ASPIRIN 81 MG/1
TABLET ORAL
Qty: 30 TABLET | Refills: 3 | Status: SHIPPED | OUTPATIENT
Start: 2021-06-21 | End: 2021-07-13 | Stop reason: SDUPTHER

## 2021-06-28 ENCOUNTER — TELEPHONE (OUTPATIENT)
Dept: FAMILY MEDICINE CLINIC | Age: 76
End: 2021-06-28

## 2021-06-28 NOTE — TELEPHONE ENCOUNTER
----- Message from Rene Armijo sent at 6/25/2021 12:38 PM EDT -----  Subject: Appointment Request    Reason for Call: Routine (Patient Request) No Script    QUESTIONS  Type of Appointment? Established Patient  Reason for appointment request? Available appointments did not meet   patient need  Additional Information for Provider? Patient is having diarrhea for the   past 2 weeks or more (possibly the past month) with no other symptoms. Patient does not have access to V[facility]. Patient is requesting an appointment   before 07/21/2021 and it must be f2f. Please call patient at ph# below   ([phone]) but do not leave a message. NO VM.   ---------------------------------------------------------------------------  --------------  CALL BACK INFO  What is the best way for the office to contact you? Do not leave any   message, patient will call back for answer  Preferred Call Back Phone Number? [phone]  ---------------------------------------------------------------------------  --------------  SCRIPT ANSWERS  Relationship to Patient? Self  Appointment reason? Symptomatic  Select script based on patient symptoms? Adult No Script  (Is the patient requesting to see the provider for a procedure?)? No  (Is the patient requesting to see the provider urgently  today or   tomorrow. )? No  Have you been diagnosed with, awaiting test results for, or told that you   are suspected of having COVID-19 (Coronavirus)? (If patient has tested   negative or was tested as a requirement for work, school, or travel and   not based on symptoms, answer no)? No  Do you currently have flu-like symptoms including fever or chills, cough,   shortness of breath, difficulty breathing, or new loss of taste or smell? No  Have you had close contact with someone with COVID-19 in the last 14 days? No  (Service Expert  click yes below to proceed with [employer] As Usual   Scheduling)?  Yes

## 2021-07-13 DIAGNOSIS — R79.89 LOW SERUM CALCIUM: ICD-10-CM

## 2021-07-13 DIAGNOSIS — F34.1 DYSTHYMIA: ICD-10-CM

## 2021-07-13 DIAGNOSIS — B02.29 POST HERPETIC NEURALGIA: ICD-10-CM

## 2021-07-13 DIAGNOSIS — Z78.0 ASYMPTOMATIC MENOPAUSE: ICD-10-CM

## 2021-07-13 RX ORDER — ASPIRIN 81 MG/1
TABLET ORAL
Qty: 30 TABLET | Refills: 3 | Status: ON HOLD
Start: 2021-07-13 | End: 2021-11-17 | Stop reason: HOSPADM

## 2021-07-13 RX ORDER — TIZANIDINE 2 MG/1
TABLET ORAL
Qty: 90 TABLET | Refills: 0 | Status: SHIPPED | OUTPATIENT
Start: 2021-07-13 | End: 2021-09-28

## 2021-07-13 RX ORDER — GABAPENTIN 800 MG/1
800 TABLET ORAL 3 TIMES DAILY
Qty: 90 TABLET | Refills: 1 | Status: SHIPPED | OUTPATIENT
Start: 2021-07-13 | End: 2022-01-03

## 2021-07-13 RX ORDER — TRAMADOL HYDROCHLORIDE 50 MG/1
50 TABLET ORAL DAILY
Qty: 60 TABLET | Refills: 0 | Status: SHIPPED | OUTPATIENT
Start: 2021-07-13 | End: 2021-08-24

## 2021-07-13 RX ORDER — PHENOL 1.4 %
AEROSOL, SPRAY (ML) MUCOUS MEMBRANE
Qty: 60 TABLET | Refills: 5 | Status: SHIPPED | OUTPATIENT
Start: 2021-07-13 | End: 2022-01-31

## 2021-07-13 RX ORDER — SERTRALINE HYDROCHLORIDE 25 MG/1
TABLET, FILM COATED ORAL
Qty: 30 TABLET | Refills: 3 | Status: SHIPPED | OUTPATIENT
Start: 2021-07-13 | End: 2022-03-28

## 2021-07-13 RX ORDER — LANOLIN ALCOHOL/MO/W.PET/CERES
3 CREAM (GRAM) TOPICAL DAILY
Qty: 30 TABLET | Refills: 3 | Status: SHIPPED | OUTPATIENT
Start: 2021-07-13

## 2021-07-13 NOTE — TELEPHONE ENCOUNTER
Last visit: 3/12/21      Next Visit Date:  Future Appointments   Date Time Provider Dusty Hortoni   8/2/2021  1:00 PM [facility]   Topic Date Due    Hepatitis C screen  Never done    Flu vaccine (1) 09/01/2021    Annual Wellness Visit (AWV)  09/15/2021    DTaP/Tdap/Td vaccine (2 - Td or Tdap) 09/14/2030    DEXA (modify frequency per FRAX score)  Completed    Shingles Vaccine  Completed    Pneumococcal 65+ years Vaccine  Completed    COVID-19 Vaccine  Completed    Hepatitis A vaccine  Aged Out    Hepatitis B vaccine  Aged Out    Hib vaccine  Aged Out    Meningococcal (ACWY) vaccine  Aged Out       No results found for: LABA1C          ( goal A1C is < 7)   No results found for: LABMICR  LDL Cholesterol (mg/dL)   Date Value   03/19/2019 116   05/30/2018 97       (goal LDL is <100)   AST (U/L)   Date Value   03/19/2019 28     ALT (U/L)   Date Value   03/19/2019 27     BUN (mg/dL)   Date Value   03/19/2019 13     BP Readings from Last 3 Encounters:   03/15/21 138/72   12/14/20 115/63   09/14/20 116/69          (goal 120/80)    All Future Testing planned in [facility]  Lab Frequency Next Occurrence               Patient Active Problem List:     Colitis     Proctitis     Diarrhea of presumed infectious origin     Left ovarian cyst     Recurrent colitis due to Clostridium difficile     Enterocolitis     Primary insomnia     Asymptomatic menopausal state

## 2021-07-15 ENCOUNTER — TELEPHONE (OUTPATIENT)
Dept: FAMILY MEDICINE CLINIC | Age: 76
End: 2021-07-15

## 2021-07-15 NOTE — TELEPHONE ENCOUNTER
Can you please review   Tramadol 50 mg one daily or twice daily ? Daniel received  order for tramadol 50 mg one daily #60 .  Thank you

## 2021-08-02 ENCOUNTER — OFFICE VISIT (OUTPATIENT)
Dept: FAMILY MEDICINE CLINIC | Age: 76
End: 2021-08-02
Payer: MEDICARE

## 2021-08-02 VITALS
HEART RATE: 67 BPM | BODY MASS INDEX: 22.47 KG/M2 | OXYGEN SATURATION: 95 % | DIASTOLIC BLOOD PRESSURE: 76 MMHG | TEMPERATURE: 97.2 F | HEIGHT: 61 IN | WEIGHT: 119 LBS | SYSTOLIC BLOOD PRESSURE: 138 MMHG

## 2021-08-02 DIAGNOSIS — Z13.1 SCREENING FOR DIABETES MELLITUS (DM): ICD-10-CM

## 2021-08-02 DIAGNOSIS — Z13.220 SCREENING FOR HYPERLIPIDEMIA: ICD-10-CM

## 2021-08-02 DIAGNOSIS — Z72.89 OTHER PROBLEMS RELATED TO LIFESTYLE: ICD-10-CM

## 2021-08-02 DIAGNOSIS — Z11.59 NEED FOR HEPATITIS C SCREENING TEST: ICD-10-CM

## 2021-08-02 DIAGNOSIS — Z13.6 SCREENING FOR CARDIOVASCULAR CONDITION: ICD-10-CM

## 2021-08-02 DIAGNOSIS — K52.9 COLITIS: ICD-10-CM

## 2021-08-02 DIAGNOSIS — Z00.00 ROUTINE GENERAL MEDICAL EXAMINATION AT A HEALTH CARE FACILITY: Primary | ICD-10-CM

## 2021-08-02 DIAGNOSIS — F34.1 DYSTHYMIA: ICD-10-CM

## 2021-08-02 PROCEDURE — G0439 PPPS, SUBSEQ VISIT: HCPCS | Performed by: FAMILY MEDICINE

## 2021-08-02 PROCEDURE — 1123F ACP DISCUSS/DSCN MKR DOCD: CPT | Performed by: FAMILY MEDICINE

## 2021-08-02 PROCEDURE — 4040F PNEUMOC VAC/ADMIN/RCVD: CPT | Performed by: FAMILY MEDICINE

## 2021-08-02 ASSESSMENT — PATIENT HEALTH QUESTIONNAIRE - PHQ9
SUM OF ALL RESPONSES TO PHQ9 QUESTIONS 1 & 2: 0
SUM OF ALL RESPONSES TO PHQ QUESTIONS 1-9: 0
1. LITTLE INTEREST OR PLEASURE IN DOING THINGS: 0
2. FEELING DOWN, DEPRESSED OR HOPELESS: 0

## 2021-08-02 ASSESSMENT — LIFESTYLE VARIABLES: HOW OFTEN DO YOU HAVE A DRINK CONTAINING ALCOHOL: 0

## 2021-08-02 NOTE — PROGRESS NOTES
Medicare Annual Wellness Visit  Name: Lavern Sheffield Date: 2021   MRN: [id number] Sex: Female   Age: 67 y.o. Ethnicity:  / Carie Perry   : 1945 Race: Other      Alfredodaniela Ramirez is here for Medicare AWV    Screenings for behavioral, psychosocial and functional/safety risks, and cognitive dysfunction are all negative except as indicated below. These results, as well as other patient data from the [address] form, are documented in Flowsheets linked to this Encounter. No Known Allergies      Prior to Visit Medications    Medication Sig Taking? Authorizing Provider   gabapentin (NEURONTIN) 800 MG tablet Take 1 tablet by mouth 3 times daily for 30 days. Yes Jef Tobar MD   diclofenac (VOLTAREN) 50 MG EC tablet Take 1 tablet by mouth 3 times daily Yes Jef Tobar MD   melatonin 3 MG TABS tablet Take 1 tablet by mouth daily Yes Jef Tobar MD   sertraline (ZOLOFT) 25 MG tablet TAKE ONE TABLET BY MOUTH DAILY Yes Jef Tobar MD   aspirin (ASPIRIN ADULT LOW STRENGTH) 81 MG EC tablet TAKE ONE TABLET BY MOUTH DAILY Yes Jef Tobar MD   traMADol (ULTRAM) 50 MG tablet Take 1 tablet by mouth daily for 30 days. Yes Jef Tobar MD   tiZANidine (ZANAFLEX) 2 MG tablet Take 1 tab by mouth every 8 hrs as needed Yes Jef Tobar MD   calcium carbonate 600 MG TABS tablet TAKE ONE TABLET BY MOUTH TWICE A DAY WITH VITAMIN D3 500 MG.  Yes Jef Tobar MD   naloxone 4 MG/0.1ML LIQD nasal spray 1 spray by Nasal route as needed for Opioid Reversal Yes Jef Tobar MD   naloxone 4 MG/0.1ML LIQD nasal spray 1 spray by Nasal route as needed for Opioid Reversal Yes Jef Tobar MD   naloxone 4 MG/0.1ML LIQD nasal spray 1 spray by Nasal route as needed for Opioid Reversal Yes Jef Tobar MD   CALCIUM PLUS VITAMIN D3 600-500 MG-UNIT CAPS TAKE 1 CAPSULE BY MOUTH TWICE A DAY Yes Jef Tobar MD   Lactobacillus (ACIDOPHILUS) CAPS capsule TAKE 1 CAPSULE EVERY DAY Yes Historical Provider, MD   chlorhexidine (PERIDEX) 0.12 % solution Take 15 mLs by mouth 2 times daily Yes Historical Provider, MD   zoster recombinant adjuvanted vaccine (SHINGRIX) 50 MCG/0.5ML SUSR injection Inject 0.5 mLs into the muscle See Admin Instructions 1 dose now and repeat in 2-6 months Yes Nicol Cook MD   traZODone (DESYREL) 50 MG tablet Take 1 tablet by mouth nightly  Patient not taking: Reported on 12/14/2020  Nicol Cook MD   cimetidine (TAGAMET) 800 MG tablet Take 1 tablet by mouth 2 times daily  Patient not taking: Reported on 12/14/2020  Nicol Cook MD   Probiotic Acidophilus [facility]) TABS Take 1 tablet by mouth daily  Patient not taking: Reported on 12/14/2020  Nicol Cook MD         Past Medical History:   Diagnosis Date    Acid reflux     Shingles        Past Surgical History:   Procedure Laterality Date    APPENDECTOMY      CHOLECYSTECTOMY      HYSTERECTOMY         No family history on file. CareTeam (Including outside providers/suppliers regularly involved in providing care):   Patient Care Team:  Nicol Cook MD as PCP - General (Family Medicine)  Nicol Cook MD as PCP - REHABILITATION [facility] Empaneled Provider    Wt Readings from Last 3 Encounters:   08/02/21 119 lb (54 kg)   03/15/21 119 lb 12.8 oz (54.3 kg)   12/14/20 119 lb (54 kg)     Vitals:    08/02/21 1300   BP: 138/76   Pulse: 67   Temp: 97.2 °F (36.2 °C)   SpO2: 95%   Weight: 119 lb (54 kg)   Height: 5' 1\" (1.549 m)     Body mass index is 22.48 kg/m². 68 yo female here for her annual visit. Based upon direct observation of the patient, evaluation of cognition reveals recent and remote memory intact. HENT:   /76   Pulse 67   Temp 97.2 °F (36.2 °C)   Ht 5' 1\" (1.549 m)   Wt 119 lb (54 kg)   SpO2 95%   BMI 22.48 kg/m²   Constitutional: Alert and oriented. Well-nourished. Head: Normocephalic and atraumatic.    Right Ear: External ear normal. TM: no bulging, erythema or fluid seen.  Left Ear: External ear normal. TM: no bulging, erythema or fluid seen. Nose: Nose normal.   Mouth/Throat: Oropharynx is clear and moist. teeth in good repair, some missing. Eyes: Pupils are equal, round, and reactive to light. Right eye exhibits no discharge. Left eye exhibits no discharge. No scleral icterus. Neck: Normal range of motion. Neck supple. No JVD present. No tracheal deviation present. No thyromegaly present. Cardiovascular: Normal rate, regular rhythm, normal heart sounds. Pulmonary/Chest: Effort normal and breath sounds normal. No respiratory distress. She has no wheezes. She has no rales. Abdominal: Soft. Bowel sounds are normal.  She exhibits no distension and no mass. There is no tenderness. There is no rebound and no guarding. Musculoskeletal: Normal range of motion. She exhibits no edema or tenderness. Lymphadenopathy:    She has no cervical adenopathy. Neurological:  She is alert and oriented to person, place, and time. Cranial nerves grossly intact. No sensation problem noted. Muscle strength 5/5 throughout. Skin: Skin is warm and dry. No rash noted. No erythema. Psychiatric:  She has a normal mood and affect. Behavior is normal.      Patient's complete Health Risk Assessment and screening values have been reviewed and are found in Flowsheets. The following problems were reviewed today and where indicated follow up appointments were made and/or referrals ordered. Positive Risk Factor Screenings with Interventions:            General Health and ACP:  General  In general, how would you say your health is?: Good  In the past 7 days, have you experienced any of the following?  New or Increased Pain, New or Increased Fatigue, Loneliness, Social Isolation, Stress or Anger?: (!) New or Increased Fatigue  Do you get the social and emotional support that you need?: Yes  Do you have a Living Will?: Yes  Advance Directives     Power of  Living Will ACP-Advance Directive ACP-Power of     Not on File Not on File Not on File Filed      General Health Risk Interventions:  · Fatigue: not able to sleep at night    Health Habits/Nutrition:  Health Habits/Nutrition  Do you exercise for at least 20 minutes 2-3 times per week?: Yes  Have you lost any weight without trying in the past 3 months?: No  Do you eat only one meal per day?: No  Have you seen the dentist within the past year?: (!) No  Body mass index: 22.48  Health Habits/Nutrition Interventions:  · Dental exam overdue:  patient encouraged to make appointment with his/her dentist    Hearing/Vision:  No exam data present  Hearing/Vision  Do you or your family notice any trouble with your hearing that hasn't been managed with hearing aids?: No  Have you had an eye exam within the past year?: (!) No  Hearing/Vision Interventions:  · Vision concerns:  patient encouraged to make appointment with his/her eye specialist     ADL:  ADLs  In the past 7 days, did you need help from others to perform any of the following everyday activities? Eating, dressing, grooming, bathing, toileting, or walking/balance?: None  In the past 7 days, did you need help from others to take care of any of the following?  Laundry, housekeeping, banking/finances, shopping, telephone use, food preparation, transportation, or taking medications?: (!) Transportation  ADL Interventions:  · Patient declines any further evaluation/treatment for this issue    Personalized Preventive Plan   Current Health Maintenance Status  Immunization History   Administered Date(s) Administered    Influenza, Quadv, 6 mo and older, IM, PF (Flulaval, Fluarix) 10/15/2018    Influenza, Quadv, adjuvanted, 65 yrs +, IM, PF (Fluad) 09/14/2020    Influenza, Triv, inactivated, subunit, adjuvanted, IM (Fluad 65 yrs and older) 10/17/2017, 11/13/2019    Pneumococcal Conjugate 13-valent (Lindbergh Lot) 10/15/2018, 03/14/2020    Pneumococcal Polysaccharide ([id number]) 12/09/2019  Tdap (Boostrix, Adacel) 09/14/2020    Zoster Recombinant (Shingrix) 07/18/2019, 03/14/2020        Health Maintenance   Topic Date Due    Hepatitis C screen  Never done    Flu vaccine (1) 09/01/2021    Annual Wellness Visit (AWV)  09/15/2021    DTaP/Tdap/Td vaccine (2 - Td or Tdap) 09/14/2030    DEXA (modify frequency per FRAX score)  Completed    Shingles Vaccine  Completed    Pneumococcal 65+ years Vaccine  Completed    COVID-19 Vaccine  Completed    Hepatitis A vaccine  Aged Out    Hepatitis B vaccine  Aged Out    Hib vaccine  Aged Out    Meningococcal (ACWY) vaccine  Aged Out     Recommendations for [facility] Due: see orders and patient instructions/AVS.  . Recommended screening schedule for the next 5-10 years is provided to the patient in written form: see Patient Instructions/AVS.    [facility] was seen today for medicare awv. Diagnoses and all orders for this visit:    Routine general medical examination at a health care facility    Dysthymia  -     Lipid Panel; Future    Screening for hyperlipidemia    Screening for diabetes mellitus (DM)    Screening for cardiovascular condition  -     Lipid Panel; Future    Colitis  -     Hepatitis C Antibody; Future  -     CBC Auto Differential; Future  -     TSH with Reflex; Future  -     Comprehensive Metabolic Panel; Future  -     Lipid Panel; Future    Need for hepatitis C screening test  -     Hepatitis C Antibody; Future    Other problems related to lifestyle  -     Hepatitis C Antibody; Future           Pt is doing well. She will use for insomnia benadryl or melatonin. Call or return to clinic prn if these symptoms worsen or fail to improve as anticipated. I have reviewed the instructions with the patient, answering all questions to her satisfaction.

## 2021-08-02 NOTE — PATIENT INSTRUCTIONS
Personalized Preventive Plan for mAos Hidalgo - 8/2/2021  Medicare offers a range of preventive health benefits. Some of the tests and screenings are paid in full while other may be subject to a deductible, co-insurance, and/or copay. Some of these benefits include a comprehensive review of your medical history including lifestyle, illnesses that may run in your family, and various assessments and screenings as appropriate. After reviewing your medical record and screening and assessments performed today your provider may have ordered immunizations, labs, imaging, and/or referrals for you. A list of these orders (if applicable) as well as your Preventive Care list are included within your After Visit Summary for your review. Other Preventive Recommendations:    · A preventive eye exam performed by an eye specialist is recommended every 1-2 years to screen for glaucoma; cataracts, macular degeneration, and other eye disorders. · A preventive dental visit is recommended every 6 months. · Try to get at least 150 minutes of exercise per week or 10,000 steps per day on a pedometer . · Order or download the FREE \"Exercise & Physical Activity: Your Everyday Guide\" from [employer]. Call 4-917.815.5642 or search [employer] online. · You need 5253-8324 mg of calcium and 1285-1682 IU of vitamin D per day. It is possible to meet your calcium requirement with diet alone, but a vitamin D supplement is usually necessary to meet this goal.  · When exposed to the sun, use a sunscreen that protects against both UVA and UVB radiation with an SPF of 30 or greater. Reapply every 2 to 3 hours or after sweating, drying off with a towel, or swimming. · Always wear a seat belt when traveling in a car. Always wear a helmet when riding a bicycle or motorcycle. Heart-Healthy Diet   Sodium, Fat, and Cholesterol Controlled Diet       What Is a Heart Healthy Diet?    A heart-healthy diet is one that limits sodium , certain types of fat , and cholesterol . This type of diet is recommended for:   People with any form of cardiovascular disease (eg, coronary heart disease , peripheral vascular disease , previous heart attack , previous stroke )   People with risk factors for cardiovascular disease, such as high blood pressure , high cholesterol , or diabetes   Anyone who wants to lower their risk of developing cardiovascular disease   Sodium    Sodium is a mineral found in many foods. In general, most people consume much more sodium than they need. Diets high in sodium can increase blood pressure and lead to edema (water retention). On a heart-healthy diet, you should consume no more than 2,300 mg (milligrams) of sodium per dayabout the amount in one teaspoon of table salt. The foods highest in sodium include table salt (about 50% sodium), processed foods, convenience foods, and preserved foods. Cholesterol    Cholesterol is a fat-like, waxy substance in your blood. Our bodies make some cholesterol. It is also found in animal products, with the highest amounts in fatty meat, egg yolks, whole milk, cheese, shellfish, and organ meats. On a heart-healthy diet, you should limit your cholesterol intake to less than 200 mg per day. It is normal and important to have some cholesterol in your bloodstream. But too much cholesterol can cause plaque to build up within your arteries, which can eventually lead to a heart attack or stroke. The two types of cholesterol that are most commonly referred to are:   Low-density lipoprotein (LDL) cholesterol  Also known as bad cholesterol, this is the cholesterol that tends to build up along your arteries. Bad cholesterol levels are increased by eating fats that are saturated or hydrogenated. Optimal level of this cholesterol is less than 100. Over 130 starts to get risky for heart disease.    High-density lipoprotein (HDL) cholesterol  Also known as good cholesterol, this type of cholesterol actually carries cholesterol away from your arteries and may, therefore, help lower your risk of having a heart attack. You want this level to be high (ideally greater than 60). It is a risk to have a level less than 40. You can raise this good cholesterol by eating olive oil, canola oil, avocados, or nuts. Exercise raises this level, too. Fat    Fat is calorie dense and packs a lot of calories into a small amount of food. Even though fats should be limited due to their high calorie content, not all fats are bad. In fact, some fats are quite healthful. Fat can be broken down into four main types. The good-for-you fats are:   Monounsaturated fat  found in oils such as olive and canola, avocados, and nuts and natural nut butters; can decrease cholesterol levels, while keeping levels of HDL cholesterol high   Polyunsaturated fat  found in oils such as safflower, sunflower, soybean, corn, and sesame; can decrease total cholesterol and LDL cholesterol   Omega-3 fatty acids  particularly those found in fatty fish (such as salmon, trout, tuna, mackerel, herring, and sardines); can decrease risk of arrhythmias, decrease triglyceride levels, and slightly lower blood pressure   The fats that you want to limit are:   Saturated fat  found in animal products, many fast foods, and a few vegetables; increases total blood cholesterol, including LDL levels   Animal fats that are saturated include: butter, lard, whole-milk dairy products, meat fat, and poultry skin   Vegetable fats that are saturated include: hydrogenated shortening, palm oil, coconut oil, cocoa butter   Hydrogenated or trans fat  found in margarine and vegetable shortening, most shelf stable snack foods, and fried foods; increases LDL and decreases HDL     It is generally recommended that you limit your total fat for the day to less than 30% of your total calories.  If you follow an 1800-calorie heart healthy diet, for example, this would mean 60 grams of fat or less per day. Saturated fat and trans fat in your diet raises your blood cholesterol the most, much more than dietary cholesterol does. For this reason, on a heart-healthy diet, less than 7% of your calories should come from saturated fat and ideally 0% from trans fat. On an 1800-calorie diet, this translates into less than 14 grams of saturated fat per day, leaving 46 grams of fat to come from mono- and polyunsaturated fats.    Food Choices on a Heart Healthy Diet   Food Category   Foods Recommended   Foods to Avoid   Grains   Breads and rolls without salted tops Most dry and cooked cereals Unsalted crackers and breadsticks Low-sodium or homemade breadcrumbs or stuffing All rice and pastas   Breads, rolls, and crackers with salted tops High-fat baked goods (eg, muffins, donuts, pastries) Quick breads, self-rising flour, and biscuit mixes Regular bread crumbs Instant hot cereals Commercially prepared rice, pasta, or stuffing mixes   Vegetables   Most fresh, frozen, and low-sodium canned vegetables Low-sodium and salt-free vegetable juices Canned vegetables if unsalted or rinsed   Regular canned vegetables and juices, including sauerkraut and pickled vegetables Frozen vegetables with sauces Commercially prepared potato and vegetable mixes   Fruits   Most fresh, frozen, and canned fruits All fruit juices   Fruits processed with salt or sodium   Milk   Nonfat or low-fat (1%) milk Nonfat or low-fat yogurt Cottage cheese, low-fat ricotta, cheeses labeled as low-fat and low-sodium   Whole milk Reduced-fat (2%) milk Malted and chocolate milk Full fat yogurt Most cheeses (unless low-fat and low salt) Buttermilk (no more than 1 cup per week)   Meats and Beans   Lean cuts of fresh or frozen beef, veal, lamb, or pork (look for the word loin) Fresh or frozen poultry without the skin Fresh or frozen fish and some shellfish Egg whites and egg substitutes (Limit whole eggs to three per week) Tofu Nuts or seeds (unsalted, dry-roasted), low-sodium peanut butter Dried peas, beans, and lentils   Any smoked, cured, salted, or canned meat, fish, or poultry (including troy, chipped beef, cold cuts, hot dogs, sausages, sardines, and anchovies) Poultry skins Breaded and/or fried fish or meats Canned peas, beans, and lentils Salted nuts   Fats and Oils   Olive oil and canola oil Low-sodium, low-fat salad dressings and mayonnaise   Butter, margarine, coconut and palm oils, troy fat   Snacks, Sweets, and Condiments   Low-sodium or unsalted versions of broths, soups, soy sauce, and condiments Pepper, herbs, and spices; vinegar, lemon, or lime juice Low-fat frozen desserts (yogurt, sherbet, fruit bars) Sugar, cocoa powder, honey, syrup, jam, and preserves Low-fat, trans-fat free cookies, cakes, and pies Zack and animal crackers, fig bars, talat snaps   High-fat desserts Broth, soups, gravies, and sauces, made from instant mixes or other high-sodium ingredients Salted snack foods Canned olives Meat tenderizers, seasoning salt, and most flavored vinegars   Beverages   Low-sodium carbonated beverages Tea and coffee in moderation Soy milk   Commercially softened water   Suggestions   Make whole grains, fruits, and vegetables the base of your diet. Choose heart-healthy fats such as canola, olive, and flaxseed oil, and foods high in heart-healthy fats, such as nuts, seeds, soybeans, tofu, and fish. Eat fish at least twice per week; the fish highest in omega-3 fatty acids and lowest in mercury include salmon, herring, mackerel, sardines, and canned chunk light tuna. If you eat fish less than twice per week or have high triglycerides, talk to your doctor about taking fish oil supplements. Read food labels.    For products low in fat and cholesterol, look for fat free, low-fat, cholesterol free, saturated fat free, and trans fat freeAlso scan the Nutrition Facts Label, which lists saturated fat, trans fat, and cholesterol amounts. For products low in sodium, look for sodium free, very low sodium, low sodium, no added salt, and unsalted   Skip the salt when cooking or at the table; if food needs more flavor, get creative and try out different herbs and spices. Garlic and onion also add substantial flavor to foods. Trim any visible fat off meat and poultry before cooking, and drain the fat off after tran. Use cooking methods that require little or no added fat, such as grilling, boiling, baking, poaching, broiling, roasting, steaming, stir-frying, and sauting. Avoid fast food and convenience food. They tend to be high in saturated and trans fat and have a lot of added salt. Talk to a registered dietitian for individualized diet advice. Last Reviewed: March 2011 Marcus Zuniga MS, MPH, RD   Updated: 3/29/2011   ·     Preventing Osteoporosis: After Your Visit  Your Care Instructions  Osteoporosis means the bones are weak and thin enough that they can break easily. The older you are, the more likely you are to get osteoporosis. But with plenty of calcium, vitamin D, and exercise, you can help prevent osteoporosis. The preteen and teen years are a key time for bone building. With the help of calcium, vitamin D, and exercise in those early years and beyond, the bones reach their peak density and strength by age 27. After age 27, your bones naturally start to thin and weaken. The stronger your bones are at around age 27, the lower your risk for osteoporosis. But no matter what your age and risk are, your bones still need calcium, vitamin D, and exercise to stay strong. Also avoid smoking, and limit alcohol. Smoking and heavy alcohol use can make your bones thinner. Talk to your doctor about any special risks you might have, such as having a close relative with osteoporosis or taking a medicine that can weaken bones. Your doctor can tell you the best ways to protect your bones from thinning.   Follow-up alcohol. Drink no more than 1 alcohol drink a day if you are a woman. Drink no more than 2 alcohol drinks a day if you are a man. Do not smoke. Smoking can make bones thin faster. If you need help quitting, talk to your doctor about stop-smoking programs and medicines. These can increase your chances of quitting for good. When should you call for help? Watch closely for changes in your health, and be sure to contact your doctor if:  You need help with a healthy eating plan. You need help with an exercise plan    © 3409-5098 [employer]. Care instructions adapted under license by [facility]. This care instruction is for use with your licensed healthcare professional. If you have questions about a medical condition or this instruction, always ask your healthcare professional. [address] any warranty or liability for your use of this information. Content Version: 9.4.05217; Last Revised: June 20, 2011              ·     Keep Your Memory Andre Keel       Many factors can affect your ability to remembera hectic lifestyle, aging, stress, chronic disease, and certain medicines. But, there are steps you can take to sharpen your mind and help preserve your memory. Challenge Your Brain   Regularly challenging your mind may help keeps it in top shape. Good mental exercises include:   Crossword puzzlesUse a dictionary if you need it; you will learn more that way. Brainteasers Try some! Crafts, such as wood working and sewing   Hobbies, such as gardening and building model airplanes   SocializingVisit old friends or join groups to meet new ones.    Reading   Learning a new language   Taking a class, whether it be art history or abe chi   TravelingExperience the food, history, and culture of your destination   Learning to use a computer   Going to museums, the theater, or thought-provoking movies   Changing things in your daily life, such as reversing your pattern in the grocery store or brushing your teeth using your nondominant hand   Use Memory Aids   There is no need to remember every detail on your own. These memory aids can help:   Calendars and day planners   Electronic organizers to store all sorts of helpful informationThese devices can \"beep\" to remind you of appointments. A book of days to record birthdays, anniversaries, and other occasions that occur on the same date every year   Detailed \"to-do\" lists and strategically placed sticky notes   Quick \"study\" sessionsBefore a gathering, review who will be there so their names will be fresh in your mind. Establish routinesFor example, keep your keys, wallet, and umbrella in the same place all the time or take medicine with your 8:00 AM glass of juice   Live a Healthy Life   Many actions that will keep your body strong will do the same for your mind. For example:   Talk to Your Doctor About Herbs and Supplements    Malnutrition and vitamin deficiencies can impair your mental function. For example, vitamin B12 deficiency can cause a range of symptoms, including confusion. But, what if your nutritional needs are being met? Can herbs and supplements still offer a benefit? Researchers have investigated a range of natural remedies, such as ginkgo , ginseng , and the supplement phosphatidylserine (PS). So far, though, the evidence is inconsistent as to whether these products can improve memory or thinking. If you are interested in taking herbs and supplements, talk to your doctor first because they may interact with other medicines that you are taking. Exercise Regularly    Among the many benefits of regular exercise are increased blood flow to the brain and decreased risk of certain diseases that can interfere with memory function. One study found that even moderate exercise has a beneficial effect.  Examples of \"moderate\" exercise include:   Playing 18 holes of golf once a week, without a cart   Playing tennis twice a week Walking one mile per day   Manage Stress    It can be tough to remember what is important when your mind is cluttered. Make time for relaxation. Choose activities that calm you down, and make it routine. Manage Chronic Conditions    Side effects of high blood pressure , diabetes, and heart disease can interfere with mental function. Many of the lifestyle steps discussed here can help manage these conditions. Strive to eat a healthy diet, exercise regularly, get stress under control, and follow your doctor's advice for your condition. Minimize Medications    Talk to your doctor about the medicines that you take. Some may be unnecessary. Also, healthy lifestyle habits may lower the need for certain drugs.      Last Reviewed: April 2010 Cindi Gallegos MD   Updated: 4/13/2010   ·

## 2021-08-05 ENCOUNTER — HOSPITAL ENCOUNTER (OUTPATIENT)
Age: 76
Setting detail: SPECIMEN
Discharge: HOME | End: 2021-08-05
Payer: COMMERCIAL

## 2021-08-05 DIAGNOSIS — Z11.59 NEED FOR HEPATITIS C SCREENING TEST: ICD-10-CM

## 2021-08-05 DIAGNOSIS — Z72.89 OTHER PROBLEMS RELATED TO LIFESTYLE: ICD-10-CM

## 2021-08-05 DIAGNOSIS — K52.9 COLITIS: ICD-10-CM

## 2021-08-05 LAB — HEPATITIS C ANTIBODY: NONREACTIVE

## 2021-08-24 DIAGNOSIS — B02.29 POST HERPETIC NEURALGIA: ICD-10-CM

## 2021-08-24 RX ORDER — TRAMADOL HYDROCHLORIDE 50 MG/1
TABLET ORAL
Qty: 60 TABLET | Refills: 0 | Status: SHIPPED | OUTPATIENT
Start: 2021-08-24 | End: 2021-09-23

## 2021-09-28 RX ORDER — TIZANIDINE 2 MG/1
TABLET ORAL
Qty: 90 TABLET | Refills: 0 | Status: SHIPPED | OUTPATIENT
Start: 2021-09-28 | End: 2021-11-01

## 2021-09-28 NOTE — TELEPHONE ENCOUNTER
Valentine Pollock is calling to request a refill on the following medication(s):    Last Visit Date (If Applicable):  5/2/2474    Next Visit Date:    Visit date not found    Medication Request:  Requested Prescriptions     Pending Prescriptions Disp Refills    tiZANidine (ZANAFLEX) 2 MG tablet [Pharmacy Med Name: tiZANidine HCL 2MG TABLET] 90 tablet 0     Sig: TAKE ONE TABLET BY MOUTH EVERY 8 HOURS AS NEEDED    diclofenac (VOLTAREN) 50 MG EC tablet [Pharmacy Med Name: DICLOFENAC SOD EC 50 MG TAB] 90 tablet 0     Sig: TAKE ONE TABLET BY MOUTH THREE TIMES A DAY

## 2021-11-01 RX ORDER — TIZANIDINE 2 MG/1
TABLET ORAL
Qty: 90 TABLET | Refills: 0 | Status: SHIPPED | OUTPATIENT
Start: 2021-11-01 | End: 2021-12-02

## 2021-11-01 NOTE — TELEPHONE ENCOUNTER
Andrew Benson is calling to request a refill on the following medication(s):    Last Visit Date (If Applicable):  9/3/6457    Next Visit Date:    10/30/2021    Medication Request:  Requested Prescriptions     Pending Prescriptions Disp Refills    diclofenac (VOLTAREN) 50 MG EC tablet [Pharmacy Med Name: DICLOFENAC SOD EC 50 MG TAB] 90 tablet 0     Sig: TAKE ONE TABLET BY MOUTH THREE TIMES A DAY

## 2021-11-01 NOTE — TELEPHONE ENCOUNTER
Zaina Ponce is calling to request a refill on the following medication(s):    Last Visit Date (If Applicable):  0/5/6395    Next Visit Date:    Visit date not found    Medication Request:  Requested Prescriptions     Pending Prescriptions Disp Refills    tiZANidine (ZANAFLEX) 2 MG tablet [Pharmacy Med Name: tiZANidine HCL 2MG TABLET] 90 tablet 0     Sig: TAKE ONE TABLET BY MOUTH EVERY 8 HOURS AS NEEDED

## 2021-11-15 ENCOUNTER — APPOINTMENT (OUTPATIENT)
Dept: CT IMAGING | Age: 76
DRG: 378 | End: 2021-11-15
Payer: MEDICARE

## 2021-11-15 ENCOUNTER — HOSPITAL ENCOUNTER (INPATIENT)
Age: 76
LOS: 3 days | Discharge: HOME HEALTH | DRG: 378 | End: 2021-11-18
Attending: EMERGENCY MEDICINE | Admitting: FAMILY MEDICINE
Payer: MEDICARE

## 2021-11-15 DIAGNOSIS — K92.2 GASTROINTESTINAL HEMORRHAGE, UNSPECIFIED GASTROINTESTINAL HEMORRHAGE TYPE: Primary | ICD-10-CM

## 2021-11-15 PROBLEM — K31.9 GASTROPATHY ASSOCIATED WITH NONSTEROIDAL ANTI-INFLAMMATORY DRUG (NSAID): Status: ACTIVE | Noted: 2021-11-15

## 2021-11-15 PROBLEM — B02.29 POST HERPETIC NEURALGIA: Status: ACTIVE | Noted: 2021-11-15

## 2021-11-15 PROBLEM — K92.0 GASTROINTESTINAL HEMORRHAGE WITH HEMATEMESIS: Status: ACTIVE | Noted: 2021-11-15

## 2021-11-15 PROBLEM — T39.395A GASTROPATHY ASSOCIATED WITH NONSTEROIDAL ANTI-INFLAMMATORY DRUG (NSAID): Status: ACTIVE | Noted: 2021-11-15

## 2021-11-15 LAB
-: ABNORMAL
ABSOLUTE EOS #: <0.03 K/UL (ref 0–0.44)
ABSOLUTE IMMATURE GRANULOCYTE: 0.02 K/UL (ref 0–0.3)
ABSOLUTE LYMPH #: 0.9 K/UL (ref 1.1–3.7)
ABSOLUTE MONO #: 0.37 K/UL (ref 0.1–1.2)
ALBUMIN SERPL-MCNC: 3.9 G/DL (ref 3.5–5.2)
ALBUMIN/GLOBULIN RATIO: ABNORMAL (ref 1–2.5)
ALP BLD-CCNC: 51 U/L (ref 35–104)
ALT SERPL-CCNC: 16 U/L (ref 5–33)
AMORPHOUS: ABNORMAL
ANION GAP SERPL CALCULATED.3IONS-SCNC: 14 MMOL/L (ref 9–17)
AST SERPL-CCNC: 20 U/L
BACTERIA: ABNORMAL
BASOPHILS # BLD: 0 % (ref 0–2)
BASOPHILS ABSOLUTE: 0.03 K/UL (ref 0–0.2)
BILIRUB SERPL-MCNC: 0.27 MG/DL (ref 0.3–1.2)
BILIRUBIN URINE: NEGATIVE
BNP INTERPRETATION: ABNORMAL
BUN BLDV-MCNC: 65 MG/DL (ref 8–23)
BUN/CREAT BLD: 84 (ref 9–20)
CALCIUM SERPL-MCNC: 9.7 MG/DL (ref 8.6–10.4)
CASTS UA: ABNORMAL /LPF
CHLORIDE BLD-SCNC: 102 MMOL/L (ref 98–107)
CO2: 25 MMOL/L (ref 20–31)
COLOR: YELLOW
COMMENT UA: ABNORMAL
CREAT SERPL-MCNC: 0.77 MG/DL (ref 0.5–0.9)
CRYSTALS, UA: ABNORMAL /HPF
DATE, STOOL #1: ABNORMAL
DATE, STOOL #2: ABNORMAL
DATE, STOOL #3: ABNORMAL
DIFFERENTIAL TYPE: ABNORMAL
EOSINOPHILS RELATIVE PERCENT: 0 % (ref 1–4)
EPITHELIAL CELLS UA: ABNORMAL /HPF (ref 0–5)
GFR AFRICAN AMERICAN: >60 ML/MIN
GFR NON-AFRICAN AMERICAN: >60 ML/MIN
GFR SERPL CREATININE-BSD FRML MDRD: ABNORMAL ML/MIN/{1.73_M2}
GFR SERPL CREATININE-BSD FRML MDRD: ABNORMAL ML/MIN/{1.73_M2}
GLUCOSE BLD-MCNC: 99 MG/DL (ref 70–99)
GLUCOSE URINE: NEGATIVE
HCT VFR BLD CALC: 22.7 % (ref 36.3–47.1)
HCT VFR BLD CALC: 29.2 % (ref 36.3–47.1)
HEMOCCULT SP1 STL QL: POSITIVE
HEMOCCULT SP2 STL QL: ABNORMAL
HEMOCCULT SP3 STL QL: ABNORMAL
HEMOGLOBIN: 7.2 G/DL (ref 11.9–15.1)
HEMOGLOBIN: 9.1 G/DL (ref 11.9–15.1)
IMMATURE GRANULOCYTES: 0 %
INR BLD: 1.1
IRON SATURATION: 58 % (ref 20–55)
IRON: 120 UG/DL (ref 37–145)
KETONES, URINE: ABNORMAL
LEUKOCYTE ESTERASE, URINE: NEGATIVE
LIPASE: 31 U/L (ref 13–60)
LYMPHOCYTES # BLD: 13 % (ref 24–43)
MCH RBC QN AUTO: 31 PG (ref 25.2–33.5)
MCHC RBC AUTO-ENTMCNC: 31.2 G/DL (ref 28.4–34.8)
MCV RBC AUTO: 99.3 FL (ref 82.6–102.9)
MONOCYTES # BLD: 6 % (ref 3–12)
MUCUS: ABNORMAL
NITRITE, URINE: NEGATIVE
NRBC AUTOMATED: 0 PER 100 WBC
OTHER OBSERVATIONS UA: ABNORMAL
PARTIAL THROMBOPLASTIN TIME: 24.7 SEC (ref 23.9–33.8)
PDW BLD-RTO: 13 % (ref 11.8–14.4)
PH UA: 7 (ref 5–8)
PLATELET # BLD: 173 K/UL (ref 138–453)
PLATELET ESTIMATE: ABNORMAL
PMV BLD AUTO: 10.9 FL (ref 8.1–13.5)
POTASSIUM SERPL-SCNC: 4.6 MMOL/L (ref 3.7–5.3)
PRO-BNP: 329 PG/ML
PROTEIN UA: NEGATIVE
PROTHROMBIN TIME: 14.4 SEC (ref 11.5–14.2)
RBC # BLD: 2.94 M/UL (ref 3.95–5.11)
RBC # BLD: ABNORMAL 10*6/UL
RBC UA: ABNORMAL /HPF (ref 0–2)
RENAL EPITHELIAL, UA: ABNORMAL /HPF
SEG NEUTROPHILS: 81 % (ref 36–65)
SEGMENTED NEUTROPHILS ABSOLUTE COUNT: 5.41 K/UL (ref 1.5–8.1)
SODIUM BLD-SCNC: 141 MMOL/L (ref 135–144)
SPECIFIC GRAVITY UA: 1.01 (ref 1–1.03)
TIME, STOOL #1: 850
TIME, STOOL #2: ABNORMAL
TIME, STOOL #3: ABNORMAL
TOTAL IRON BINDING CAPACITY: 207 UG/DL (ref 250–450)
TOTAL PROTEIN: 6.2 G/DL (ref 6.4–8.3)
TRICHOMONAS: ABNORMAL
TROPONIN INTERP: NORMAL
TROPONIN T: NORMAL NG/ML
TROPONIN, HIGH SENSITIVITY: 13 NG/L (ref 0–14)
TURBIDITY: CLEAR
UNSATURATED IRON BINDING CAPACITY: 87 UG/DL (ref 112–347)
URINE HGB: ABNORMAL
UROBILINOGEN, URINE: NORMAL
WBC # BLD: 6.7 K/UL (ref 3.5–11.3)
WBC # BLD: ABNORMAL 10*3/UL
WBC UA: ABNORMAL /HPF (ref 0–5)
YEAST: ABNORMAL

## 2021-11-15 PROCEDURE — 99283 EMERGENCY DEPT VISIT LOW MDM: CPT

## 2021-11-15 PROCEDURE — 6360000002 HC RX W HCPCS: Performed by: EMERGENCY MEDICINE

## 2021-11-15 PROCEDURE — 93005 ELECTROCARDIOGRAM TRACING: CPT | Performed by: EMERGENCY MEDICINE

## 2021-11-15 PROCEDURE — 83690 ASSAY OF LIPASE: CPT

## 2021-11-15 PROCEDURE — 86901 BLOOD TYPING SEROLOGIC RH(D): CPT

## 2021-11-15 PROCEDURE — 85610 PROTHROMBIN TIME: CPT

## 2021-11-15 PROCEDURE — 85014 HEMATOCRIT: CPT

## 2021-11-15 PROCEDURE — 84484 ASSAY OF TROPONIN QUANT: CPT

## 2021-11-15 PROCEDURE — 83550 IRON BINDING TEST: CPT

## 2021-11-15 PROCEDURE — 6360000002 HC RX W HCPCS: Performed by: NURSE PRACTITIONER

## 2021-11-15 PROCEDURE — 2580000003 HC RX 258: Performed by: EMERGENCY MEDICINE

## 2021-11-15 PROCEDURE — 81001 URINALYSIS AUTO W/SCOPE: CPT

## 2021-11-15 PROCEDURE — 85730 THROMBOPLASTIN TIME PARTIAL: CPT

## 2021-11-15 PROCEDURE — C9113 INJ PANTOPRAZOLE SODIUM, VIA: HCPCS | Performed by: EMERGENCY MEDICINE

## 2021-11-15 PROCEDURE — 74177 CT ABD & PELVIS W/CONTRAST: CPT

## 2021-11-15 PROCEDURE — 36415 COLL VENOUS BLD VENIPUNCTURE: CPT

## 2021-11-15 PROCEDURE — 6360000004 HC RX CONTRAST MEDICATION: Performed by: EMERGENCY MEDICINE

## 2021-11-15 PROCEDURE — 96365 THER/PROPH/DIAG IV INF INIT: CPT

## 2021-11-15 PROCEDURE — 96375 TX/PRO/DX INJ NEW DRUG ADDON: CPT

## 2021-11-15 PROCEDURE — 2580000003 HC RX 258: Performed by: NURSE PRACTITIONER

## 2021-11-15 PROCEDURE — 85025 COMPLETE CBC W/AUTO DIFF WBC: CPT

## 2021-11-15 PROCEDURE — 86920 COMPATIBILITY TEST SPIN: CPT

## 2021-11-15 PROCEDURE — 86850 RBC ANTIBODY SCREEN: CPT

## 2021-11-15 PROCEDURE — 96361 HYDRATE IV INFUSION ADD-ON: CPT

## 2021-11-15 PROCEDURE — 1200000000 HC SEMI PRIVATE

## 2021-11-15 PROCEDURE — 83540 ASSAY OF IRON: CPT

## 2021-11-15 PROCEDURE — C9113 INJ PANTOPRAZOLE SODIUM, VIA: HCPCS | Performed by: NURSE PRACTITIONER

## 2021-11-15 PROCEDURE — 85018 HEMOGLOBIN: CPT

## 2021-11-15 PROCEDURE — 99222 1ST HOSP IP/OBS MODERATE 55: CPT | Performed by: INTERNAL MEDICINE

## 2021-11-15 PROCEDURE — 86900 BLOOD TYPING SEROLOGIC ABO: CPT

## 2021-11-15 PROCEDURE — 82270 OCCULT BLOOD FECES: CPT

## 2021-11-15 PROCEDURE — 83880 ASSAY OF NATRIURETIC PEPTIDE: CPT

## 2021-11-15 PROCEDURE — 80053 COMPREHEN METABOLIC PANEL: CPT

## 2021-11-15 RX ORDER — 0.9 % SODIUM CHLORIDE 0.9 %
1000 INTRAVENOUS SOLUTION INTRAVENOUS ONCE
Status: COMPLETED | OUTPATIENT
Start: 2021-11-15 | End: 2021-11-15

## 2021-11-15 RX ORDER — SODIUM CHLORIDE 9 MG/ML
INJECTION, SOLUTION INTRAVENOUS CONTINUOUS
Status: DISCONTINUED | OUTPATIENT
Start: 2021-11-15 | End: 2021-11-18 | Stop reason: HOSPADM

## 2021-11-15 RX ORDER — SODIUM CHLORIDE 0.9 % (FLUSH) 0.9 %
10 SYRINGE (ML) INJECTION PRN
Status: DISCONTINUED | OUTPATIENT
Start: 2021-11-15 | End: 2021-11-15

## 2021-11-15 RX ORDER — ACETAMINOPHEN 650 MG/1
650 SUPPOSITORY RECTAL EVERY 6 HOURS PRN
Status: DISCONTINUED | OUTPATIENT
Start: 2021-11-15 | End: 2021-11-18 | Stop reason: HOSPADM

## 2021-11-15 RX ORDER — ACETAMINOPHEN 325 MG/1
650 TABLET ORAL EVERY 6 HOURS PRN
Status: DISCONTINUED | OUTPATIENT
Start: 2021-11-15 | End: 2021-11-18 | Stop reason: HOSPADM

## 2021-11-15 RX ORDER — MORPHINE SULFATE 2 MG/ML
1 INJECTION, SOLUTION INTRAMUSCULAR; INTRAVENOUS EVERY 4 HOURS PRN
Status: DISCONTINUED | OUTPATIENT
Start: 2021-11-15 | End: 2021-11-18 | Stop reason: HOSPADM

## 2021-11-15 RX ORDER — CHLORHEXIDINE GLUCONATE 0.12 MG/ML
15 RINSE ORAL 2 TIMES DAILY
Status: DISCONTINUED | OUTPATIENT
Start: 2021-11-15 | End: 2021-11-18 | Stop reason: HOSPADM

## 2021-11-15 RX ORDER — SODIUM CHLORIDE 9 MG/ML
25 INJECTION, SOLUTION INTRAVENOUS PRN
Status: DISCONTINUED | OUTPATIENT
Start: 2021-11-15 | End: 2021-11-18 | Stop reason: HOSPADM

## 2021-11-15 RX ORDER — SODIUM CHLORIDE 0.9 % (FLUSH) 0.9 %
5-40 SYRINGE (ML) INJECTION PRN
Status: DISCONTINUED | OUTPATIENT
Start: 2021-11-15 | End: 2021-11-18 | Stop reason: HOSPADM

## 2021-11-15 RX ORDER — ONDANSETRON 2 MG/ML
4 INJECTION INTRAMUSCULAR; INTRAVENOUS EVERY 6 HOURS PRN
Status: DISCONTINUED | OUTPATIENT
Start: 2021-11-15 | End: 2021-11-18 | Stop reason: HOSPADM

## 2021-11-15 RX ORDER — SODIUM CHLORIDE 0.9 % (FLUSH) 0.9 %
5-40 SYRINGE (ML) INJECTION EVERY 12 HOURS SCHEDULED
Status: DISCONTINUED | OUTPATIENT
Start: 2021-11-15 | End: 2021-11-18 | Stop reason: HOSPADM

## 2021-11-15 RX ORDER — ONDANSETRON 4 MG/1
4 TABLET, ORALLY DISINTEGRATING ORAL EVERY 8 HOURS PRN
Status: DISCONTINUED | OUTPATIENT
Start: 2021-11-15 | End: 2021-11-18 | Stop reason: HOSPADM

## 2021-11-15 RX ORDER — ONDANSETRON 2 MG/ML
4 INJECTION INTRAMUSCULAR; INTRAVENOUS ONCE
Status: COMPLETED | OUTPATIENT
Start: 2021-11-15 | End: 2021-11-15

## 2021-11-15 RX ADMIN — SODIUM CHLORIDE 100 ML/HR: 9 INJECTION, SOLUTION INTRAVENOUS at 18:30

## 2021-11-15 RX ADMIN — SODIUM CHLORIDE 1000 ML: 9 INJECTION, SOLUTION INTRAVENOUS at 08:45

## 2021-11-15 RX ADMIN — SODIUM CHLORIDE, PRESERVATIVE FREE 10 ML: 5 INJECTION INTRAVENOUS at 10:51

## 2021-11-15 RX ADMIN — PANTOPRAZOLE SODIUM 8 MG/HR: 40 INJECTION, POWDER, FOR SOLUTION INTRAVENOUS at 20:06

## 2021-11-15 RX ADMIN — IOPAMIDOL 100 ML: 755 INJECTION, SOLUTION INTRAVENOUS at 10:51

## 2021-11-15 RX ADMIN — SODIUM CHLORIDE 80 MG: 9 INJECTION, SOLUTION INTRAVENOUS at 08:45

## 2021-11-15 RX ADMIN — ONDANSETRON HYDROCHLORIDE 4 MG: 2 INJECTION, SOLUTION INTRAVENOUS at 08:45

## 2021-11-15 RX ADMIN — PANTOPRAZOLE SODIUM 8 MG/HR: 40 INJECTION, POWDER, FOR SOLUTION INTRAVENOUS at 09:39

## 2021-11-15 ASSESSMENT — ENCOUNTER SYMPTOMS
SHORTNESS OF BREATH: 0
DIARRHEA: 0
BACK PAIN: 0
ABDOMINAL PAIN: 1
CONSTIPATION: 0
NAUSEA: 1
VOMITING: 1

## 2021-11-15 ASSESSMENT — PAIN SCALES - GENERAL: PAINLEVEL_OUTOF10: 0

## 2021-11-15 NOTE — ED PROVIDER NOTES
[facility]  Emergency Department Encounter     Pt Name: Zaina Ponce  MRN: [id number]  [place] 1945  Date of evaluation: 11/15/21  PCP:  Carissa Galeas MD    [address] [place] [address]       Chief Complaint   Patient presents with    Emesis     this morning, coffee ground       HISTORY OF PRESENT ILLNESS  (Location/Symptom, Timing/Onset, Context/Setting, Quality, Duration, Modifying Factors, Severity.)    Zaina Ponce is a 67 y.o. female who presents with nausea, vomitting lightheadedness and dizziness. Patient states that she got up to use the restroom earlier this morning and she had an episode of dark brown/black vomit. She also noticed yesterday that her stools were much darker than usual.  She states that when she had this episode of vomiting this morning she did become very lightheaded and dizzy and passed out. She denies any headache, neck or back pain. Denies taking any blood thinners or having any coagulopathies. Has never had this issue in the past.  She states that she is having some generalized abdominal pain but nowhere in particular hurts her. She does have a history of both appendectomy and cholecystectomy. No urinary complaints. No chest pain shortness of breath or heart palpitations. PAST MEDICAL / SURGICAL / SOCIAL / FAMILY HISTORY    has a past medical history of Acid reflux and Shingles. has a past surgical history that includes Appendectomy; Cholecystectomy; and Hysterectomy.     Social History     Socioeconomic History    Marital status:      Spouse name: Not on file    Number of children: Not on file    Years of education: Not on file    Highest education level: Not on file   Occupational History    Not on file   Tobacco Use    Smoking status: Never Smoker    Smokeless tobacco: Never Used   Vaping Use    Vaping Use: Never used   Substance and Sexual Activity    Alcohol use: No    Drug use: No    Sexual activity: Not on file   Other Topics Concern    Not on file   Social History Narrative    Not on file     Social Determinants of Health     Financial Resource Strain:     Difficulty of Paying Living Expenses: Not on file   Food Insecurity:     Worried About [address] in the Last Year: Not on file    Nieves of Food in the Last Year: Not on file   Transportation Needs:     Lack of Transportation (Medical): Not on file    Lack of Transportation (Non-Medical): Not on file   Physical Activity:     Days of Exercise per Week: Not on file    Minutes of Exercise per Session: Not on file   Stress:     Feeling of Stress : Not on file   Social Connections:     Frequency of Communication with Friends and Family: Not on file    Frequency of Social Gatherings with Friends and Family: Not on file    Attends Mosque Services: Not on file    Active Member of [address] [place] [address] or Organizations: Not on file    Attends Club or Organization Meetings: Not on file    Marital Status: Not on file   Intimate Partner Violence:     Fear of Current or Ex-Partner: Not on file    Emotionally Abused: Not on file    Physically Abused: Not on file    Sexually Abused: Not on file   Housing Stability:     Unable to Pay for Housing in the Last Year: Not on file    Number of [place] in the Last Year: Not on file    Unstable Housing in the Last Year: Not on file       History reviewed. No pertinent family history. Allergies:    Patient has no known allergies. Home Medications:  Prior to Admission medications    Medication Sig Start Date End Date Taking? Authorizing Provider   diclofenac (VOLTAREN) 50 MG EC tablet TAKE ONE TABLET BY MOUTH THREE TIMES A DAY 11/1/21   Esha Villegas MD   tiZANidine (ZANAFLEX) 2 MG tablet TAKE ONE TABLET BY MOUTH EVERY 8 HOURS AS NEEDED 11/1/21   Esha Villegas MD   gabapentin (NEURONTIN) 800 MG tablet Take 1 tablet by mouth 3 times daily for 30 days.  7/13/21 8/12/21  Esha Villegas MD melatonin 3 MG TABS tablet Take 1 tablet by mouth daily 7/13/21   Marlena Quiñones MD   sertraline (ZOLOFT) 25 MG tablet TAKE ONE TABLET BY MOUTH DAILY 7/13/21   Marlena Quiñones MD   aspirin (ASPIRIN ADULT LOW STRENGTH) 81 MG EC tablet TAKE ONE TABLET BY MOUTH DAILY 7/13/21   Marlena Quiñones MD   calcium carbonate 600 MG TABS tablet TAKE ONE TABLET BY MOUTH TWICE A DAY WITH VITAMIN D3 500 MG. 7/13/21   Marlena Quiñones MD   naloxone 4 MG/0.1ML LIQD nasal spray 1 spray by Nasal route as needed for Opioid Reversal 5/24/21   Marlena Quiñones MD   naloxone 4 MG/0.1ML LIQD nasal spray 1 spray by Nasal route as needed for Opioid Reversal 4/20/21   Marlena Quiñones MD   naloxone 4 MG/0.1ML LIQD nasal spray 1 spray by Nasal route as needed for Opioid Reversal 3/17/21   Marlena Quiñones MD   CALCIUM PLUS VITAMIN D3 600-500 MG-UNIT CAPS TAKE 1 CAPSULE BY MOUTH TWICE A DAY 8/3/20   Marlena Quiñones MD   traZODone (DESYREL) 50 MG tablet Take 1 tablet by mouth nightly  Patient not taking: Reported on 12/14/2020 6/11/20   Marlena Quiñones MD   cimetidine (TAGAMET) 800 MG tablet Take 1 tablet by mouth 2 times daily  Patient not taking: Reported on 12/14/2020 6/11/20   Marlena Quiñones MD   Probiotic Acidophilus [facility]) TABS Take 1 tablet by mouth daily  Patient not taking: Reported on 12/14/2020 6/11/20   Marlena Quiñones MD   Lactobacillus (ACIDOPHILUS) CAPS capsule TAKE 1 CAPSULE EVERY DAY 9/6/19   Historical Provider, MD   chlorhexidine (PERIDEX) 0.12 % solution Take 15 mLs by mouth 2 times daily    Historical Provider, MD   zoster recombinant adjuvanted vaccine [facility]) 50 MCG/0.5ML SUSR injection Inject 0.5 mLs into the muscle See Admin Instructions 1 dose now and repeat in 2-6 months 6/7/19   Marlena Quiñones MD       REVIEW OF SYSTEMS    (2-9 systems for level 4, 10 or more for level 5)    Review of Systems   Constitutional: Negative for chills, diaphoresis and fever. Eyes: Negative for visual disturbance. Respiratory: Negative for shortness of breath. Cardiovascular: Negative for chest pain and palpitations. Gastrointestinal: Positive for abdominal pain, nausea and vomiting. Negative for constipation and diarrhea. Musculoskeletal: Negative for back pain. Neurological: Positive for dizziness, syncope, weakness and light-headedness. Hematological: Does not bruise/bleed easily. Psychiatric/Behavioral: Negative for confusion. PHYSICAL EXAM   (up to 7 for level 4, 8 or more for level 5)    VITALS:   Vitals:    11/15/21 0818   BP: 113/67   Pulse: 96   Resp: 16   SpO2: 99%   Weight: 119 lb (54 kg)   Height: 5' 2\" (1.575 m)       Physical Exam  Vitals and nursing note reviewed. Constitutional:       General: She is not in acute distress. Appearance: She is well-developed. She is not diaphoretic. HENT:      Head: Normocephalic and atraumatic. Eyes:      Conjunctiva/sclera: Conjunctivae normal.   Cardiovascular:      Rate and Rhythm: Regular rhythm. Tachycardia present. Heart sounds: Normal heart sounds. No murmur heard. Pulmonary:      Effort: Pulmonary effort is normal. No respiratory distress. Breath sounds: Normal breath sounds. No wheezing, rhonchi or rales. Abdominal:      General: Abdomen is flat. There is no distension. Palpations: Abdomen is soft. Tenderness: There is generalized abdominal tenderness. There is no guarding or rebound. Musculoskeletal:         General: Normal range of motion. Cervical back: Normal range of motion. Right lower leg: No edema. Left lower leg: No edema. Skin:     General: Skin is warm and dry. Coloration: Skin is pale. Neurological:      General: No focal deficit present. Mental Status: She is alert.    Psychiatric:         Behavior: Behavior normal.         DIFFERENTIAL  DIAGNOSIS   PLAN (LABS / IMAGING / EKG):  Orders Placed This Encounter   Procedures    CT ABDOMEN PELVIS W IV CONTRAST Additional Contrast? None    CBC with DIFF    Comprehensive Metabolic Panel w/ Reflex to MG    Lipase    PROTIME-INR    APTT    Occ Bld, Fecal Scrn    Troponin    Brain Natriuretic Peptide    Urinalysis with Microscopic    Telemetry monitoring - continuous duration    Inpatient consult to Hospitalist    EKG 12 Lead    TYPE AND SCREEN    Insert peripheral IV    PATIENT STATUS (FROM ED OR OR/PROCEDURAL) Inpatient       MEDICATIONS ORDERED:  Orders Placed This Encounter   Medications    0.9 % sodium chloride bolus    pantoprazole (PROTONIX) 80 mg in sodium chloride 0.9 % 50 mL bolus    ondansetron (ZOFRAN) injection 4 mg    pantoprazole (PROTONIX) 80 mg in sodium chloride 0.9 % 100 mL infusion    iopamidol (ISOVUE-370) 76 % injection 100 mL    sodium chloride flush 0.9 % injection 10 mL       DIAGNOSTIC RESULTS / EMERGENCYDEPARTMENT COURSE / MDM   LABS:  Labs Reviewed   CBC WITH AUTO DIFFERENTIAL - Abnormal; Notable for the following components:       Result Value    RBC 2.94 (*)     Hemoglobin 9.1 (*)     Hematocrit 29.2 (*)     Seg Neutrophils 81 (*)     Lymphocytes 13 (*)     Eosinophils % 0 (*)     Absolute Lymph # 0.90 (*)     All other components within normal limits   COMPREHENSIVE METABOLIC PANEL W/ REFLEX TO MG FOR LOW K - Abnormal; Notable for the following components:    BUN 65 (*)     Bun/Cre Ratio 84 (*)     Total Bilirubin 0.27 (*)     Total Protein 6.2 (*)     All other components within normal limits   PROTIME-INR - Abnormal; Notable for the following components:    Protime 14.4 (*)     All other components within normal limits   OCCULT BLOOD SCREEN - Abnormal; Notable for the following components:    Occult Blood, Stool #1 POSITIVE (*)     All other components within normal limits   BRAIN NATRIURETIC PEPTIDE - Abnormal; Notable for the following components:    Pro- (*)     All other components within normal limits   URINALYSIS WITH MICROSCOPIC - Abnormal; Notable for the following components:    Ketones, Urine TRACE (*)     Urine Hgb 3+ (*)     All other components within normal limits   LIPASE   APTT   TROPONIN   TYPE AND SCREEN       RADIOLOGY:  CT ABDOMEN PELVIS W IV CONTRAST Additional Contrast? None    Result Date: 11/15/2021  EXAMINATION: CT OF THE ABDOMEN AND PELVIS WITH CONTRAST 11/15/2021 9:46 am TECHNIQUE: CT of the abdomen and pelvis was performed with the administration of intravenous contrast. Multiplanar reformatted images are provided for review. Dose modulation, iterative reconstruction, and/or weight based adjustment of the mA/kV was utilized to reduce the radiation dose to as low as reasonably achievable. COMPARISON: 12/10/2018 HISTORY: ORDERING SYSTEM PROVIDED HISTORY: dark stools hemoccult positive, coffee ground emesis TECHNOLOGIST PROVIDED HISTORY: dark stools hemoccult positive, coffee ground emesis Decision Support Exception - unselect if not a suspected or confirmed emergency medical condition->Emergency Medical Condition (MA) Reason for Exam: Dark stool, hematemesis this morning. Hx hysterectomy, cholecystectomy, appendectomy Acuity: Acute Type of Exam: Initial FINDINGS: Lower Chest: The visualized heart and lungs show no acute abnormalities. Organs: 8 mm left renal cyst.  Other tiny bilateral renal hypodensities too small to characterize. Mild bilateral pelvic calyceal dilatation is attributed to functional dilatation. The spleen, liver, pancreas and adrenal glands show no significant abnormalities. Cholecystectomy noted. GI/Bowel: There is limited evaluation due to absence of oral contrast.  Focal asymmetric mucosal hyperenhancement and somewhat nodularity involving posterior wall of the pylorus. Peptic ulcer disease or other pathology not excluded. Correlation with endoscopy advised. No evidence for small bowel obstruction. No acute process in the colon. Pelvis: Urinary bladder is unremarkable. Hysterectomy noted. Peritoneum/Retroperitoneum: No free fluid. No lymphadenopathy. Atherosclerotic disease. Bones/Soft Tissues: Mild levoscoliosis. Diffuse degenerative changes. 1. There focal asymmetric a nodularity mucosal hyperenhancement along posterior wall the pylorus raising concern for peptic ulcer disease or other pathology. Endoscopic correlation advised 2. 8 mm left renal cysts and additional tiny hypodensities too small to characterize.        EKG    EKG Interpretation    Interpreted by emergency department physician    Rhythm: normal sinus   Rate: normal  Axis: normal  Ectopy: none  Conduction: normal  ST Segments: no acute change  T Waves: inversion in  III  Q Waves: none    Clinical Impression: non-specific EKG    All EKG's are interpreted by the Emergency Department Physician whoeither signs or Co-signs this chart in the absence of a cardiologist.    EMERGENCY DEPARTMENT COURSE:  ED Course as of 11/15/21 1315   Mon Nov 15, 2021   4973 Rectal done [AO]   0914 Occ Bld, Fecal Scrn(!):    Occult Blood, Stool #1 POSITIVE(!)   Date, Stool #1 [id number]   Time, Stool #1 0850   Occult Blood, Stool #2 NOT REPORTED   Date, Stool #2 NOT REPORTED   Time, Stool #2 NOT REPORTED   Occult Blood, Stool #3 NOT REPORTED   Date, Stool #3 NOT REPORTED   Time, Stool #3 NOT REPORTED [AO]   0914 CBC with DIFF(!):    WBC 6.7   RBC 2.94(!)   Hemoglobin Quant 9.1(!)   Hematocrit 29.2(!)   MCV 99.3   MCH 31.0   MCHC 31.2   RDW 13.0   Platelet Count 360   MPV 10.9   NRBC Automated 0.0   Differential Type NOT REPORTED   Seg Neutrophils 81(!)   Lymphocytes 13(!)   Monocytes 6   Eosinophils % 0(!)   Basophils 0   Immature Granulocytes 0   Segs Absolute 5.41   Absolute Lymph # 0.90(!)   Absolute Mono # 0.37   Absolute Eos # <0.03   Basophils Absolute 0.03   Absolute Immature Granulocyte 0.02   WBC Morphology NOT REPORTED   RBC Morphology NOT REPORTED   Platelet Estimate NOT REPORTED [AO]   0923 INR: 1.1 [AO]   0923 PTT: 24.7 [AO]   0923 Lipase: 31 [AO]   0924 Comprehensive Metabolic Panel w/ Reflex to MG(!):    Glucose 99   BUN 65(!)   Creatinine 0.77   Bun/Cre Ratio 84(!)   Calcium 9.7   Sodium 141   Potassium 4.6   Chloride 102   CO2 25   Anion Gap 14   Alk Phos 51   ALT 16   AST 20   Bilirubin 0.27(!)   Total Protein 6.2(!)   Albumin 3.9   Albumin/Globulin Ratio NOT REPORTED   GFR Non- >60   GFR  >60   GFR Comment        GFR Staging NOT REPORTED [AO]   0929 Troponin, High Sensitivity: 13 [AO]   0929 Pro-BNP(!): 329 [AO]   1110 Urinalysis with Microscopic(!):    Color, UA Yellow   Turbidity UA Clear   Glucose, UA NEGATIVE   Bilirubin, Urine NEGATIVE   Ketones, Urine TRACE(!)   Specific [place], UA 1.010   Urine Hgb 3+(!)   pH, UA 7.0   Protein, UA NEGATIVE   Urobilinogen, Urine Normal   Nitrite, Urine NEGATIVE   Leukocyte Esterase, Urine NEGATIVE   Urinalysis Comments NOT REPORTED   -        WBC, UA 0 TO 2   RBC, UA 2 TO 5   Casts UA NOT REPORTED   Crystals, UA NOT REPORTED   Epithelial Cells, UA 2 TO 5   Renal Epithelial, UA NOT REPORTED   Bacteria, UA NOT REPORTED   Mucus, UA NOT REPORTED   Trichomonas, UA NOT REPORTED   Amorphous, UA NOT REPORTED   Other Observations UA NOT REPORTED   Yeast, Urine NOT REPORTED [AO]   1111 Intermed accepted [AO]      ED Course User Index  [AO] Jonathan Norman Mireles 1721, DO       MDM  Number of Diagnoses or Management Options  Gastrointestinal hemorrhage, unspecified gastrointestinal hemorrhage type  Diagnosis management comments: 76-year-old female presents emergency department with coffee-ground emesis, lightheadedness and dizziness. Initial evaluation she is tachycardic and hypotensive. General abdominal tenderness palpation. Hemoccult positive. Hemoglobin 9. Coags okay. CT of the abdomen showed concern for likely peptic ulcer disease. No need to transfuse, hypotension tachycardia improved after fluids. Started on Protonix drip.  Admitted to hospitalist.       Amount and/or Complexity of Data Reviewed  Clinical lab tests: ordered and reviewed  Tests in the radiology section of CPT®: ordered and reviewed  Review and summarize past medical records: yes  Independent visualization of images, tracings, or specimens: yes    Patient Progress  Patient progress: stable      PROCEDURES:  Procedures     CONSULTS:  IP CONSULT TO HOSPITALIST  IP CONSULT TO GI    CRITICAL CARE:  NONE    FINAL IMPRESSION     1. Gastrointestinal hemorrhage, unspecified gastrointestinal hemorrhage type        DISPOSITION / PLAN   DISPOSITION Admitted 11/15/2021 11:27:35 AM    ADMIT    Nicky Cai DO  Emergency Medicine Physician    (Please note that portions of this note were completed with a voice recognition program.  Efforts were made to edit the dictations but occasionally words are mis-transcribed.)        Jonathan Mireles 1721,   11/15/21 6096

## 2021-11-15 NOTE — H&P
[facility]  Office: [address], DO, Pedro Singer, DO, Micki Martinezo, DO, Batool Solares Blood, DO, Juan Flores MD, Pb Wiseman MD, Onel Maurice MD, Elder Guy MD, Ariel Pereira MD, Pavel Kwong MD, Toya Rivera MD, [place] Officer, DO, Bonifacio Page, DO, Davon Nielson MD,  March , DO, Marquise Kirkland MD, Shashi Patrick MD, Nancy Anand MD, Saba Cunningham MD, Katia Lopez MD, Rhiannon West MD, Ever Gallo MD, Ester Alves, [facility], [facility], CNP, Freda Berger, CNP, Nicola Pardo, CNS, Jann Salter, CNP, Kandice Fragoso, CNP, Elkin Vora, CNP, Gearline Precise, CNP, Tia Roberson, CNP, Daysi Riley PA-C, Himanshu Briones, [facility], Parker Moctezuma, CNP, Petra Leblanc, CNP, Rehana Hermosillo, CNP, Vlad Vega, CNP, Karol Goodwin, CNP, Ace Chaves, CNP, Guzman Valladares, [address]    HISTORY AND PHYSICAL EXAMINATION            Date:   11/15/2021  Patient name:  Rodolfo Age  Date of admission:  11/15/2021  8:15 AM  MRN:   [id number]  Account:  [de-identified]  YOB: 1945  PCP:    Danya De León MD  Room:   [place] [id number]  Code Status:    Prior    Chief Complaint:     Chief Complaint   Patient presents with    Emesis     this morning, coffee ground     History Obtained From:   patient, electronic medical record  History of Present Illness:   78-year-old female presented to ED with complaints of loose stools, nausea vomiting ,lightheadedness. Patient is somewhat of a poor historian and gets easily confused with her timeline. She states noticed loose stools early this morning followed by abdominal cramps and nausea and emesis with dark brown-black vomitus. She then felt lightheaded and  passed out in the bathroom. She is unsure of blood in stool. No prior history of GI bleed. Denies daily use of Tylenol or NSAIDs. Only uses occasionally.   But her home medication list includes [id number] 3 times daily and tramadol 50 mg twice daily and PCPs note indicates using Tylenol for arthritis. Has history of GERD and shingles with possible postherpetic neuralgia. ED work-up showed patient is tachycardic heart rate in 90s to 100. SBP 90s. BMP unremarkable. Hemoglobin 9.1. CT abdomen and pelvis showed focal asymptomatic nodularity and mucosal hyperenhancement along the posterior wall of the pylorus concerning for peptic ulcer disease.  -Patient currently denies any complaints. Past Medical History:     Past Medical History:   Diagnosis Date    Acid reflux     Shingles         Past Surgical History:     Past Surgical History:   Procedure Laterality Date    APPENDECTOMY      CHOLECYSTECTOMY      HYSTERECTOMY          Medications Prior to Admission:     Prior to Admission medications    Medication Sig Start Date End Date Taking? Authorizing Provider   traMADol (ULTRAM) 50 MG tablet TAKE ONE TABLET BY MOUTH TWICE A DAY 11/15/21 12/15/21  Vera Jones MD   diclofenac (VOLTAREN) 50 MG EC tablet TAKE ONE TABLET BY MOUTH THREE TIMES A DAY 11/1/21   Vera Jones MD   tiZANidine (ZANAFLEX) 2 MG tablet TAKE ONE TABLET BY MOUTH EVERY 8 HOURS AS NEEDED 11/1/21   Vera Jones MD   gabapentin (NEURONTIN) 800 MG tablet Take 1 tablet by mouth 3 times daily for 30 days.  7/13/21 8/12/21  Vera Jones MD   melatonin 3 MG TABS tablet Take 1 tablet by mouth daily 7/13/21   Vera Jones MD   sertraline (ZOLOFT) 25 MG tablet TAKE ONE TABLET BY MOUTH DAILY 7/13/21   Vera Jones MD   aspirin (ASPIRIN ADULT LOW STRENGTH) 81 MG EC tablet TAKE ONE TABLET BY MOUTH DAILY 7/13/21   Vera Jones MD   calcium carbonate 600 MG TABS tablet TAKE ONE TABLET BY MOUTH TWICE A DAY WITH VITAMIN D3 500 MG. 7/13/21   Vera Jones MD   naloxone 4 MG/0.1ML LIQD nasal spray 1 spray by Nasal route as needed for Opioid Reversal 5/24/21   Vera Jones MD   naloxone 4 MG/0.1ML LIQD nasal spray 1 spray by Nasal route as needed for Opioid Reversal 4/20/21   Juliette Lopez MD   naloxone 4 MG/0.1ML LIQD nasal spray 1 spray by Nasal route as needed for Opioid Reversal 3/17/21   Juliette Lopez MD   CALCIUM PLUS VITAMIN D3 600-500 MG-UNIT CAPS TAKE 1 CAPSULE BY MOUTH TWICE A DAY 8/3/20   Juliette Lopez MD   traZODone (DESYREL) 50 MG tablet Take 1 tablet by mouth nightly  Patient not taking: Reported on 12/14/2020 6/11/20   Juliette Lopez MD   cimetidine (TAGAMET) 800 MG tablet Take 1 tablet by mouth 2 times daily  Patient not taking: Reported on 12/14/2020 6/11/20   Juliette Lopez MD   Probiotic Acidophilus [facility]) TABS Take 1 tablet by mouth daily  Patient not taking: Reported on 12/14/2020 6/11/20   Juliette Lopez MD   Lactobacillus (ACIDOPHILUS) CAPS capsule TAKE 1 CAPSULE EVERY DAY 9/6/19   Historical Provider, MD   chlorhexidine (PERIDEX) 0.12 % solution Take 15 mLs by mouth 2 times daily    Historical Provider, MD   zoster recombinant adjuvanted vaccine [facility]) 50 MCG/0.5ML SUSR injection Inject 0.5 mLs into the muscle See Admin Instructions 1 dose now and repeat in 2-6 months 6/7/19   Juliette Lopez MD        Allergies:     Patient has no known allergies. Social History:     Tobacco:    reports that she has never smoked. She has never used smokeless tobacco.  Alcohol:      reports no history of alcohol use. Drug Use:  reports no history of drug use. Family History:     History reviewed. No pertinent family history. Review of Systems:     Positive and Negative as described in HPI.     CONSTITUTIONAL:  negative for fevers, chills, sweats, fatigue, weight loss  HEENT:  negative for vision, hearing changes, runny nose, throat pain  RESPIRATORY:  negative for shortness of breath, cough, congestion, wheezing  CARDIOVASCULAR:  negative for chest pain, palpitations  GASTROINTESTINAL: +nausea, vomiting, diarrhea, + change in bowel habits,+ abdominal pain   GENITOURINARY:  negative for difficulty of urination, burning with urination, frequency   INTEGUMENT:  negative for rash, skin lesions, easy bruising   HEMATOLOGIC/LYMPHATIC:  negative for swelling/edema   ALLERGIC/IMMUNOLOGIC:  negative for urticaria , itching  ENDOCRINE:  negative increase in drinking, increase in urination, hot or cold intolerance  MUSCULOSKELETAL:  negative joint pains,  swelling of joints, +left lateral chest pain that radiates into front  NEUROLOGICAL:  negative for headaches, dizziness, lightheadedness, numbness, pain, tingling extremities  BEHAVIOR/PSYCH:  negative for depression, anxiety    Physical Exam:   /67   Pulse 96   Resp 16   Ht 5' 2\" (1.575 m)   Wt 119 lb (54 kg)   SpO2 99%   BMI 21.77 kg/m²   No data recorded. General Appearance:  alert, ill appearing, and in no acute distress  Mental status: oriented to person, place, and time  Head:  normocephalic, atraumatic  Eye: no icterus, redness, pupils equal and reactive, extraocular eye movements intact, conjunctiva clear  Ear: normal external ear, no discharge, hearing intact  Nose:  no drainage noted  Mouth: mucous membranes dry  Neck: supple, no carotid bruits, thyroid not palpable  Lungs: Bilateral equal air entry, clear to auscultation, no wheezing, rales or rhonchi, normal effort  Cardiovascular: normal rate, regular rhythm, no murmur, gallop, rub.   Abdomen: Soft, nontender, nondistended, normal bowel sounds, no hepatomegaly or splenomegaly  Neurologic: There are no new focal motor or sensory deficits, normal muscle tone and bulk, no abnormal sensation, normal speech, cranial nerves II through XII grossly intact  Skin: No gross lesions, rashes, bruising or bleeding on exposed skin area  Extremities:  peripheral pulses palpable, no pedal edema or calf pain with palpation    Investigations:      Laboratory Testing:  Recent Results (from the past 24 hour(s))   EKG 12 Lead    Collection Time: 11/15/21  8:24 AM   Result Value Ref Range    Ventricular Rate 96 BPM Atrial Rate 96 BPM    P-R Interval 134 ms    QRS Duration 56 ms    Q-T Interval 360 ms    QTc Calculation (Bazett) 454 ms    P Axis 51 degrees    R Axis 37 degrees    T Axis 19 degrees   Occ Bld, Fecal Scrn    Collection Time: 11/15/21  8:50 AM   Result Value Ref Range    Occult Blood, Stool #1 POSITIVE (A) NEGATIVE    Date, Stool #1 [id number]     Time, Stool #1 0850     Occult Blood, Stool #2 NOT REPORTED NEGATIVE    Date, Stool #2 NOT REPORTED     Time, Stool #2 NOT REPORTED     Occult Blood, Stool #3 NOT REPORTED NEGATIVE    Date, Stool #3 NOT REPORTED     Time, Stool #3 NOT REPORTED    CBC with DIFF    Collection Time: 11/15/21  8:58 AM   Result Value Ref Range    WBC 6.7 3.5 - 11.3 k/uL    RBC 2.94 (L) 3.95 - 5.11 m/uL    Hemoglobin 9.1 (L) 11.9 - 15.1 g/dL    Hematocrit 29.2 (L) 36.3 - 47.1 %    MCV 99.3 82.6 - 102.9 fL    MCH 31.0 25.2 - 33.5 pg    MCHC 31.2 28.4 - 34.8 g/dL    RDW 13.0 11.8 - 14.4 %    Platelets 144 351 - 409 k/uL    MPV 10.9 8.1 - 13.5 fL    NRBC Automated 0.0 0.0 per 100 WBC    Differential Type NOT REPORTED     Seg Neutrophils 81 (H) 36 - 65 %    Lymphocytes 13 (L) 24 - 43 %    Monocytes 6 3 - 12 %    Eosinophils % 0 (L) 1 - 4 %    Basophils 0 0 - 2 %    Immature Granulocytes 0 0 %    Segs Absolute 5.41 1.50 - 8.10 k/uL    Absolute Lymph # 0.90 (L) 1.10 - 3.70 k/uL    Absolute Mono # 0.37 0.10 - 1.20 k/uL    Absolute Eos # <0.03 0.00 - 0.44 k/uL    Basophils Absolute 0.03 0.00 - 0.20 k/uL    Absolute Immature Granulocyte 0.02 0.00 - 0.30 k/uL    WBC Morphology NOT REPORTED     RBC Morphology NOT REPORTED     Platelet Estimate NOT REPORTED    Comprehensive Metabolic Panel w/ Reflex to MG    Collection Time: 11/15/21  8:58 AM   Result Value Ref Range    Glucose 99 70 - 99 mg/dL    BUN 65 (H) 8 - 23 mg/dL    CREATININE 0.77 0.50 - 0.90 mg/dL    Bun/Cre Ratio 84 (H) 9 - 20    Calcium 9.7 8.6 - 10.4 mg/dL    Sodium 141 135 - 144 mmol/L    Potassium 4.6 3.7 - 5.3 mmol/L    Chloride 102 98 - 107 mmol/L    CO2 25 20 - 31 mmol/L    Anion Gap 14 9 - 17 mmol/L    Alkaline Phosphatase 51 35 - 104 U/L    ALT 16 5 - 33 U/L    AST 20 <32 U/L    Total Bilirubin 0.27 (L) 0.3 - 1.2 mg/dL    Total Protein 6.2 (L) 6.4 - 8.3 g/dL    Albumin 3.9 3.5 - 5.2 g/dL    Albumin/Globulin Ratio NOT REPORTED 1.0 - 2.5    GFR Non-African American >60 >60 mL/min    GFR African American >60 >60 mL/min    GFR Comment          GFR Staging NOT REPORTED    Lipase    Collection Time: 11/15/21  8:58 AM   Result Value Ref Range    Lipase 31 13 - 60 U/L   PROTIME-INR    Collection Time: 11/15/21  8:58 AM   Result Value Ref Range    Protime 14.4 (H) 11.5 - 14.2 sec    INR 1.1    APTT    Collection Time: 11/15/21  8:58 AM   Result Value Ref Range    PTT 24.7 23.9 - 33.8 sec   Troponin    Collection Time: 11/15/21  8:58 AM   Result Value Ref Range    Troponin, High Sensitivity 13 0 - 14 ng/L    Troponin T NOT REPORTED <0.03 ng/mL    Troponin Interp NOT REPORTED    Brain Natriuretic Peptide    Collection Time: 11/15/21  8:58 AM   Result Value Ref Range    Pro- (H) <300 pg/mL    BNP Interpretation NOT REPORTED    TYPE AND SCREEN    Collection Time: 11/15/21  8:58 AM   Result Value Ref Range    Expiration Date 11/18/2021,2359     Arm Band Number [id number]     ABO/Rh A POSITIVE     Antibody Screen NEGATIVE    Urinalysis with Microscopic    Collection Time: 11/15/21 10:24 AM   Result Value Ref Range    Color, UA Yellow Yellow    Turbidity UA Clear Clear    Glucose, Ur NEGATIVE NEGATIVE    Bilirubin Urine NEGATIVE NEGATIVE    Ketones, Urine TRACE (A) NEGATIVE    Specific Gravity, UA 1.010 1.005 - 1.030    Urine Hgb 3+ (A) NEGATIVE    pH, UA 7.0 5.0 - 8.0    Protein, UA NEGATIVE NEGATIVE    Urobilinogen, Urine Normal Normal    Nitrite, Urine NEGATIVE NEGATIVE    Leukocyte Esterase, Urine NEGATIVE NEGATIVE    Urinalysis Comments NOT REPORTED     -          WBC, UA 0 TO 2 0 - 5 /HPF    RBC, UA 2 TO 5 0 - 2 /HPF    Casts UA NOT REPORTED /LPF Crystals, UA NOT REPORTED None /HPF    Epithelial Cells UA 2 TO 5 0 - 5 /HPF    Renal Epithelial, UA NOT REPORTED 0 /HPF    Bacteria, UA NOT REPORTED None    Mucus, UA NOT REPORTED None    Trichomonas, UA NOT REPORTED None    Amorphous, UA NOT REPORTED None    Other Observations UA NOT REPORTED NOT REQ. Yeast, UA NOT REPORTED None       Imaging/Diagnostics:  CT ABDOMEN PELVIS W IV CONTRAST Additional Contrast? None    Result Date: 11/15/2021  1. There focal asymmetric a nodularity mucosal hyperenhancement along posterior wall the pylorus raising concern for peptic ulcer disease or other pathology. Endoscopic correlation advised 2. 8 mm left renal cysts and additional tiny hypodensities too small to characterize. Assessment :      Hospital Problems           Last Modified POA    Gastrointestinal hemorrhage with hematemesis 11/15/2021 Yes    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 (NSAID) 11/15/2021 Yes    Post herpetic neuralgia 11/15/2021 Yes          Plan:     Patient status inpatient in the Progressive Unit/Step down   -Upper GI bleed likely secondary to NSAID induced peptic ulcer disease. Keep n.p.o.  Started on Protonix drip in ED: continue. Continue IV hydration normal saline at 100 mL/h. Discussed with RN. Monitor H&H every 6 hours. Discussed transfusion if needed. Patient is agreeable. Will transfuse PRBCs to keep hemoglobin more than 7.  GI consultation requested. We will start on IV morphine as needed to help with the left lateral chest and back pain: Previously diagnosed with postherpetic neuralgia. EPC cuffs for DVT prophylaxis. Consultations:   IP CONSULT TO HOSPITALIST  IP CONSULT TO GI   Patient is admitted as inpatient status because of co-morbidities listed above, severity of signs and symptoms as outlined, requirement for current medical therapies and most importantly because of direct risk to patient if care not provided in a hospital setting.   Expected length of stay > 48 hours.     Tristan Garcia MD  11/15/2021  6:09 PM    Copy sent to Dr. Randee Harrison MD

## 2021-11-16 ENCOUNTER — ANESTHESIA EVENT (OUTPATIENT)
Dept: OPERATING ROOM | Age: 76
DRG: 378 | End: 2021-11-16
Payer: MEDICARE

## 2021-11-16 ENCOUNTER — ANESTHESIA (OUTPATIENT)
Dept: OPERATING ROOM | Age: 76
DRG: 378 | End: 2021-11-16
Payer: MEDICARE

## 2021-11-16 VITALS — SYSTOLIC BLOOD PRESSURE: 92 MMHG | DIASTOLIC BLOOD PRESSURE: 50 MMHG | OXYGEN SATURATION: 100 %

## 2021-11-16 LAB
ANION GAP SERPL CALCULATED.3IONS-SCNC: 10 MMOL/L (ref 9–17)
BUN BLDV-MCNC: 45 MG/DL (ref 8–23)
BUN/CREAT BLD: 57 (ref 9–20)
CALCIUM SERPL-MCNC: 8 MG/DL (ref 8.6–10.4)
CHLORIDE BLD-SCNC: 112 MMOL/L (ref 98–107)
CO2: 21 MMOL/L (ref 20–31)
CREAT SERPL-MCNC: 0.79 MG/DL (ref 0.5–0.9)
EKG ATRIAL RATE: 96 BPM
EKG P AXIS: 51 DEGREES
EKG P-R INTERVAL: 134 MS
EKG Q-T INTERVAL: 360 MS
EKG QRS DURATION: 56 MS
EKG QTC CALCULATION (BAZETT): 454 MS
EKG R AXIS: 37 DEGREES
EKG T AXIS: 19 DEGREES
EKG VENTRICULAR RATE: 96 BPM
GFR AFRICAN AMERICAN: >60 ML/MIN
GFR NON-AFRICAN AMERICAN: >60 ML/MIN
GFR SERPL CREATININE-BSD FRML MDRD: ABNORMAL ML/MIN/{1.73_M2}
GFR SERPL CREATININE-BSD FRML MDRD: ABNORMAL ML/MIN/{1.73_M2}
GLUCOSE BLD-MCNC: 91 MG/DL (ref 70–99)
HCT VFR BLD CALC: 20.3 % (ref 36.3–47.1)
HCT VFR BLD CALC: 24.2 % (ref 36.3–47.1)
HCT VFR BLD CALC: 24.4 % (ref 36.3–47.1)
HCT VFR BLD CALC: 25.2 % (ref 36.3–47.1)
HEMOGLOBIN: 6.4 G/DL (ref 11.9–15.1)
HEMOGLOBIN: 7.8 G/DL (ref 11.9–15.1)
HEMOGLOBIN: 7.8 G/DL (ref 11.9–15.1)
HEMOGLOBIN: 8 G/DL (ref 11.9–15.1)
POTASSIUM SERPL-SCNC: 4.2 MMOL/L (ref 3.7–5.3)
SODIUM BLD-SCNC: 143 MMOL/L (ref 135–144)

## 2021-11-16 PROCEDURE — C9113 INJ PANTOPRAZOLE SODIUM, VIA: HCPCS | Performed by: NURSE PRACTITIONER

## 2021-11-16 PROCEDURE — 85018 HEMOGLOBIN: CPT

## 2021-11-16 PROCEDURE — 2500000003 HC RX 250 WO HCPCS: Performed by: NURSE ANESTHETIST, CERTIFIED REGISTERED

## 2021-11-16 PROCEDURE — 2580000003 HC RX 258: Performed by: NURSE PRACTITIONER

## 2021-11-16 PROCEDURE — 6360000002 HC RX W HCPCS: Performed by: NURSE PRACTITIONER

## 2021-11-16 PROCEDURE — 7100000011 HC PHASE II RECOVERY - ADDTL 15 MIN: Performed by: INTERNAL MEDICINE

## 2021-11-16 PROCEDURE — 0DB78ZX EXCISION OF STOMACH, PYLORUS, VIA NATURAL OR ARTIFICIAL OPENING ENDOSCOPIC, DIAGNOSTIC: ICD-10-PCS | Performed by: INTERNAL MEDICINE

## 2021-11-16 PROCEDURE — 2709999900 HC NON-CHARGEABLE SUPPLY: Performed by: INTERNAL MEDICINE

## 2021-11-16 PROCEDURE — 6370000000 HC RX 637 (ALT 250 FOR IP): Performed by: INTERNAL MEDICINE

## 2021-11-16 PROCEDURE — 88305 TISSUE EXAM BY PATHOLOGIST: CPT

## 2021-11-16 PROCEDURE — P9016 RBC LEUKOCYTES REDUCED: HCPCS

## 2021-11-16 PROCEDURE — 85014 HEMATOCRIT: CPT

## 2021-11-16 PROCEDURE — 3700000001 HC ADD 15 MINUTES (ANESTHESIA): Performed by: INTERNAL MEDICINE

## 2021-11-16 PROCEDURE — 3609012400 HC EGD TRANSORAL BIOPSY SINGLE/MULTIPLE: Performed by: INTERNAL MEDICINE

## 2021-11-16 PROCEDURE — 0DB58ZX EXCISION OF ESOPHAGUS, VIA NATURAL OR ARTIFICIAL OPENING ENDOSCOPIC, DIAGNOSTIC: ICD-10-PCS | Performed by: INTERNAL MEDICINE

## 2021-11-16 PROCEDURE — 7100000010 HC PHASE II RECOVERY - FIRST 15 MIN: Performed by: INTERNAL MEDICINE

## 2021-11-16 PROCEDURE — 3700000000 HC ANESTHESIA ATTENDED CARE: Performed by: INTERNAL MEDICINE

## 2021-11-16 PROCEDURE — 99222 1ST HOSP IP/OBS MODERATE 55: CPT | Performed by: INTERNAL MEDICINE

## 2021-11-16 PROCEDURE — 6360000002 HC RX W HCPCS: Performed by: NURSE ANESTHETIST, CERTIFIED REGISTERED

## 2021-11-16 PROCEDURE — 86900 BLOOD TYPING SEROLOGIC ABO: CPT

## 2021-11-16 PROCEDURE — 6370000000 HC RX 637 (ALT 250 FOR IP): Performed by: FAMILY MEDICINE

## 2021-11-16 PROCEDURE — 80048 BASIC METABOLIC PNL TOTAL CA: CPT

## 2021-11-16 PROCEDURE — 1200000000 HC SEMI PRIVATE

## 2021-11-16 PROCEDURE — 43239 EGD BIOPSY SINGLE/MULTIPLE: CPT | Performed by: INTERNAL MEDICINE

## 2021-11-16 PROCEDURE — 6360000002 HC RX W HCPCS: Performed by: INTERNAL MEDICINE

## 2021-11-16 PROCEDURE — 36415 COLL VENOUS BLD VENIPUNCTURE: CPT

## 2021-11-16 PROCEDURE — 36430 TRANSFUSION BLD/BLD COMPNT: CPT

## 2021-11-16 PROCEDURE — 2580000003 HC RX 258: Performed by: NURSE ANESTHETIST, CERTIFIED REGISTERED

## 2021-11-16 PROCEDURE — 99232 SBSQ HOSP IP/OBS MODERATE 35: CPT | Performed by: FAMILY MEDICINE

## 2021-11-16 RX ORDER — LIDOCAINE HYDROCHLORIDE 20 MG/ML
INJECTION, SOLUTION INFILTRATION; PERINEURAL PRN
Status: DISCONTINUED | OUTPATIENT
Start: 2021-11-16 | End: 2021-11-16 | Stop reason: SDUPTHER

## 2021-11-16 RX ORDER — SODIUM CHLORIDE, SODIUM LACTATE, POTASSIUM CHLORIDE, CALCIUM CHLORIDE 600; 310; 30; 20 MG/100ML; MG/100ML; MG/100ML; MG/100ML
INJECTION, SOLUTION INTRAVENOUS CONTINUOUS PRN
Status: DISCONTINUED | OUTPATIENT
Start: 2021-11-16 | End: 2021-11-16 | Stop reason: SDUPTHER

## 2021-11-16 RX ORDER — TRAMADOL HYDROCHLORIDE 50 MG/1
100 TABLET ORAL EVERY 6 HOURS PRN
Status: DISCONTINUED | OUTPATIENT
Start: 2021-11-16 | End: 2021-11-18 | Stop reason: HOSPADM

## 2021-11-16 RX ORDER — TRAZODONE HYDROCHLORIDE 50 MG/1
50 TABLET ORAL NIGHTLY
Status: DISCONTINUED | OUTPATIENT
Start: 2021-11-16 | End: 2021-11-18

## 2021-11-16 RX ORDER — TRAMADOL HYDROCHLORIDE 50 MG/1
50 TABLET ORAL EVERY 6 HOURS PRN
Status: DISCONTINUED | OUTPATIENT
Start: 2021-11-16 | End: 2021-11-18 | Stop reason: HOSPADM

## 2021-11-16 RX ORDER — GABAPENTIN 400 MG/1
800 CAPSULE ORAL 3 TIMES DAILY
Status: DISCONTINUED | OUTPATIENT
Start: 2021-11-16 | End: 2021-11-18

## 2021-11-16 RX ORDER — TIZANIDINE 4 MG/1
4 TABLET ORAL NIGHTLY
Status: DISCONTINUED | OUTPATIENT
Start: 2021-11-16 | End: 2021-11-18 | Stop reason: HOSPADM

## 2021-11-16 RX ORDER — SODIUM CHLORIDE 9 MG/ML
INJECTION, SOLUTION INTRAVENOUS PRN
Status: DISCONTINUED | OUTPATIENT
Start: 2021-11-16 | End: 2021-11-18 | Stop reason: HOSPADM

## 2021-11-16 RX ORDER — SERTRALINE HYDROCHLORIDE 25 MG/1
25 TABLET, FILM COATED ORAL DAILY
Status: DISCONTINUED | OUTPATIENT
Start: 2021-11-17 | End: 2021-11-18 | Stop reason: HOSPADM

## 2021-11-16 RX ORDER — PHENYLEPHRINE HCL IN 0.9% NACL 1 MG/10 ML
SYRINGE (ML) INTRAVENOUS PRN
Status: DISCONTINUED | OUTPATIENT
Start: 2021-11-16 | End: 2021-11-16 | Stop reason: SDUPTHER

## 2021-11-16 RX ORDER — PROPOFOL 10 MG/ML
INJECTION, EMULSION INTRAVENOUS PRN
Status: DISCONTINUED | OUTPATIENT
Start: 2021-11-16 | End: 2021-11-16 | Stop reason: SDUPTHER

## 2021-11-16 RX ORDER — ONDANSETRON 2 MG/ML
4 INJECTION INTRAMUSCULAR; INTRAVENOUS
Status: DISCONTINUED | OUTPATIENT
Start: 2021-11-16 | End: 2021-11-16 | Stop reason: HOSPADM

## 2021-11-16 RX ORDER — PANTOPRAZOLE SODIUM 40 MG/1
40 TABLET, DELAYED RELEASE ORAL
Status: DISCONTINUED | OUTPATIENT
Start: 2021-11-16 | End: 2021-11-18 | Stop reason: HOSPADM

## 2021-11-16 RX ADMIN — PROPOFOL 40 MG: 10 INJECTION, EMULSION INTRAVENOUS at 12:03

## 2021-11-16 RX ADMIN — PANTOPRAZOLE SODIUM 40 MG: 40 TABLET, DELAYED RELEASE ORAL at 15:43

## 2021-11-16 RX ADMIN — Medication 100 MCG: at 12:04

## 2021-11-16 RX ADMIN — LIDOCAINE HYDROCHLORIDE 60 MG: 20 INJECTION, SOLUTION INFILTRATION; PERINEURAL at 12:02

## 2021-11-16 RX ADMIN — TRAMADOL HYDROCHLORIDE 100 MG: 50 TABLET ORAL at 18:46

## 2021-11-16 RX ADMIN — MORPHINE SULFATE 1 MG: 2 INJECTION, SOLUTION INTRAMUSCULAR; INTRAVENOUS at 04:01

## 2021-11-16 RX ADMIN — PROPOFOL 80 MG: 10 INJECTION, EMULSION INTRAVENOUS at 12:02

## 2021-11-16 RX ADMIN — PANTOPRAZOLE SODIUM 8 MG/HR: 40 INJECTION, POWDER, FOR SOLUTION INTRAVENOUS at 04:25

## 2021-11-16 RX ADMIN — GABAPENTIN 800 MG: 400 CAPSULE ORAL at 22:15

## 2021-11-16 RX ADMIN — SODIUM CHLORIDE, POTASSIUM CHLORIDE, SODIUM LACTATE AND CALCIUM CHLORIDE: 600; 310; 30; 20 INJECTION, SOLUTION INTRAVENOUS at 11:57

## 2021-11-16 ASSESSMENT — ENCOUNTER SYMPTOMS
ABDOMINAL PAIN: 0
VOMITING: 0
CONSTIPATION: 0
BLOOD IN STOOL: 0
DIARRHEA: 0
SHORTNESS OF BREATH: 0
SHORTNESS OF BREATH: 0
CHEST TIGHTNESS: 0
COUGH: 0
WHEEZING: 0
NAUSEA: 0
RHINORRHEA: 0

## 2021-11-16 ASSESSMENT — PULMONARY FUNCTION TESTS
PIF_VALUE: 1
PIF_VALUE: 0
PIF_VALUE: 1
PIF_VALUE: 0
PIF_VALUE: 0
PIF_VALUE: 1

## 2021-11-16 ASSESSMENT — PAIN DESCRIPTION - ONSET
ONSET: ON-GOING
ONSET: ON-GOING

## 2021-11-16 ASSESSMENT — PAIN SCALES - GENERAL
PAINLEVEL_OUTOF10: 5
PAINLEVEL_OUTOF10: 0
PAINLEVEL_OUTOF10: 10
PAINLEVEL_OUTOF10: 10
PAINLEVEL_OUTOF10: 0
PAINLEVEL_OUTOF10: 0
PAINLEVEL_OUTOF10: 6

## 2021-11-16 ASSESSMENT — PAIN DESCRIPTION - LOCATION
LOCATION: OTHER (COMMENT)
LOCATION: FLANK

## 2021-11-16 ASSESSMENT — PAIN - FUNCTIONAL ASSESSMENT: PAIN_FUNCTIONAL_ASSESSMENT: PREVENTS OR INTERFERES WITH ALL ACTIVE AND SOME PASSIVE ACTIVITIES

## 2021-11-16 ASSESSMENT — PAIN DESCRIPTION - PAIN TYPE
TYPE: ACUTE PAIN;CHRONIC PAIN
TYPE: ACUTE PAIN

## 2021-11-16 ASSESSMENT — PAIN DESCRIPTION - DESCRIPTORS
DESCRIPTORS: ACHING;SHARP
DESCRIPTORS: SHARP

## 2021-11-16 ASSESSMENT — PAIN DESCRIPTION - PROGRESSION
CLINICAL_PROGRESSION: NOT CHANGED
CLINICAL_PROGRESSION: NOT CHANGED

## 2021-11-16 ASSESSMENT — PAIN DESCRIPTION - ORIENTATION
ORIENTATION: LEFT
ORIENTATION: LEFT

## 2021-11-16 ASSESSMENT — PAIN DESCRIPTION - FREQUENCY
FREQUENCY: INTERMITTENT
FREQUENCY: INTERMITTENT

## 2021-11-16 NOTE — CARE COORDINATION
Case Management Initial Discharge Plan  Freda Garza             [place] with:patient to discuss discharge plans. Information verified: address, contacts, phone number, , insurance Yes  PCP: Fanny Robin MD  Date of last visit: 2021    Insurance Provider: [facility] Medicare    Discharge Planning    Living Arrangements:  Sister and daughter   Support Systems:  Children, Family Members, Friends/Neighbors    Home has 2 stories  5 stairs to climb to get into front door, 14 stairs to climb to reach second floor  Location of bedroom/bathroom in home  - second floor    Patient able to perform ADL's:Independent    Current Services (outpatient & in home) none  DME equipment: none  DME provider: N/A    Pharmacy: [facility]    Potential Assistance Needed:  N/A    Patient agreeable to home care: No  [place] of choice provided:  n/a    Prior SNF/Rehab Placement and Facility: No  Agreeable to SNF/Rehab: No  [place] of choice provided: n/a   Evaluation: n/a    Expected Discharge date:  21  Patient expects to be discharged to: Follow Up Appointment: Best Day/ Time:      Transportation provider: family  Transportation arrangements needed for discharge: No    Readmission Risk              Risk of Unplanned Readmission:  9             Does patient have a readmission risk score greater than 14?: No  If yes, follow-up appointment must be made within 7 days of discharge. Goal of Care:       Discharge Plan: Met with patient at bedside. Lives with sister and daughter and is independent. Admitted for GI bleed. Has had 1 unit PRBC and Hgb this AM was 8.0  GI consulted. EGD with biopsy done today per Dr. Elena Fuchs. Continue to follow and no home needs anticipated.            Electronically signed by Janes Mayfield RN on 21 at 12:37 PM EST

## 2021-11-16 NOTE — PROGRESS NOTES
Patient returned from surgery for EGD. Patient resting in bed at this time. Patient in stable condition. NPO, will need to check for diet order.

## 2021-11-16 NOTE — ANESTHESIA PRE PROCEDURE
Department of Anesthesiology  Preprocedure Note       Name:  Kevin Paredes   Age:  67 y.o.  :  1945                                          MRN:  [id number]         Date:  2021      Surgeon: Gay Palomo):  Sherine uRbio MD    Procedure: Procedure(s):  EGD BIOPSY    Medications prior to admission:   Prior to Admission medications    Medication Sig Start Date End Date Taking? Authorizing Provider   traMADol (ULTRAM) 50 MG tablet TAKE ONE TABLET BY MOUTH TWICE A DAY 11/15/21 12/15/21  Erle Purchase, MD   diclofenac (VOLTAREN) 50 MG EC tablet TAKE ONE TABLET BY MOUTH THREE TIMES A DAY 21   Erle Purchase, MD   tiZANidine (ZANAFLEX) 2 MG tablet TAKE ONE TABLET BY MOUTH EVERY 8 HOURS AS NEEDED 21   Erle Purchase, MD   gabapentin (NEURONTIN) 800 MG tablet Take 1 tablet by mouth 3 times daily for 30 days.  21  Erle Purchase, MD   melatonin 3 MG TABS tablet Take 1 tablet by mouth daily 21   Erle Purchase, MD   sertraline (ZOLOFT) 25 MG tablet TAKE ONE TABLET BY MOUTH DAILY 21   Erle Purchase, MD   aspirin (ASPIRIN ADULT LOW STRENGTH) 81 MG EC tablet TAKE ONE TABLET BY MOUTH DAILY 21   Erle Purchase, MD   calcium carbonate 600 MG TABS tablet TAKE ONE TABLET BY MOUTH TWICE A DAY WITH VITAMIN D3 500 MG. 21   Erle Purchase, MD   naloxone 4 MG/0.1ML LIQD nasal spray 1 spray by Nasal route as needed for Opioid Reversal 21   Erle Purchase, MD   naloxone 4 MG/0.1ML LIQD nasal spray 1 spray by Nasal route as needed for Opioid Reversal 21   Erle Purchase, MD   naloxone 4 MG/0.1ML LIQD nasal spray 1 spray by Nasal route as needed for Opioid Reversal 3/17/21   Erle Purchase, MD   CALCIUM PLUS VITAMIN D3 600-500 MG-UNIT CAPS TAKE 1 CAPSULE BY MOUTH TWICE A DAY 8/3/20   Erle Purchase, MD   traZODone (DESYREL) 50 MG tablet Take 1 tablet by mouth nightly  Patient not taking: Reported on 2020   Erle Purchase, MD   cimetidine (TAGAMET) 800 MG tablet Take 1 tablet by mouth 2 times daily  Patient not taking: Reported on 12/14/2020 6/11/20   Daniel Khan MD   Probiotic Acidophilus [facility]) TABS Take 1 tablet by mouth daily  Patient not taking: Reported on 12/14/2020 6/11/20   Daniel Khan MD   Lactobacillus (ACIDOPHILUS) CAPS capsule TAKE 1 CAPSULE EVERY DAY 9/6/19   Historical Provider, MD   chlorhexidine (PERIDEX) 0.12 % solution Take 15 mLs by mouth 2 times daily    Historical Provider, MD   zoster recombinant adjuvanted vaccine [facility]) 50 MCG/0.5ML SUSR injection Inject 0.5 mLs into the muscle See Admin Instructions 1 dose now and repeat in 2-6 months 6/7/19   Daniel Khan MD       Current medications:    Current Facility-Administered Medications   Medication Dose Route Frequency Provider Last Rate Last Admin    [facility] Hold] 0.9 % sodium chloride infusion   IntraVENous PRN JOSELYN Morillo CNP        [MAR Hold] pantoprazole (PROTONIX) 80 mg in sodium chloride 0.9 % 100 mL infusion  8 mg/hr IntraVENous Continuous Ashley JOSELYN Mckinney - NP 10 mL/hr at 11/16/21 0425 8 mg/hr at 11/16/21 0425    [MAR Hold] chlorhexidine (PERIDEX) 0.12 % solution 15 mL  15 mL Mouth/Throat BID JOSELYN Jha - NP        [facility] Hold] 0.9 % sodium chloride infusion   IntraVENous Continuous JOSELYN Jha  mL/hr at 11/15/21 2007 Rate Verify at 11/15/21 2007    [MAR Hold] sodium chloride flush 0.9 % injection 5-40 mL  5-40 mL IntraVENous 2 times per day Ashley JOSELYN Mckinney - NP        [facility] Hold] sodium chloride flush 0.9 % injection 5-40 mL  5-40 mL IntraVENous PRN Ashley JOSELYN Mckinney - NP        [facility] Hold] 0.9 % sodium chloride infusion  25 mL IntraVENous PRN Ashley JOSELYN Mckinney - NP   Stopped at 11/16/21 0420    [MAR Hold] ondansetron (ZOFRAN-ODT) disintegrating tablet 4 mg  4 mg Oral Q8H PRN Ashley Opoka, APRN - NP        Or   [facility] Hold] ondansetron (ZOFRAN) injection 4 mg  4 mg IntraVENous Q6H PRN JOSELYN Jha - NP        [MAR Hold] acetaminophen (TYLENOL) tablet 650 mg  650 mg Oral Q6H PRN Phyllistine Keron, APRN - NP        Or   [facility] Hold] acetaminophen (TYLENOL) suppository 650 mg  650 mg Rectal Q6H PRN Phyllistine Keron, APRN - NP       [facility] Hold] morphine (PF) injection 1 mg  1 mg IntraVENous Q4H PRN Babs Spencer MD   1 mg at 11/16/21 0401    [MAR Hold] influenza quadrivalent split vaccine (FLUZONE;FLUARIX;FLULAVAL;AFLURIA) injection 0.5 mL  0.5 mL IntraMUSCular Prior to discharge Babs Spencer MD         Facility-Administered Medications Ordered in Other Encounters   Medication Dose Route Frequency Provider Last Rate Last Admin    lactated ringers infusion   IntraVENous Continuous PRN Reino Leep, APRN - CRNA   New Bag at 11/16/21 1157    propofol injection   IntraVENous PRN Reino Leep, APRN - CRNA   40 mg at 11/16/21 1203    lidocaine 2 % injection   IntraVENous PRN Reino Leep, APRN - CRNA   60 mg at 11/16/21 1202    phenylephrine (MALIK-SYNEPHRINE) 1 MG/10ML prefilled syringe   IntraVENous PRN Reino Leep, APRN - CRNA   100 mcg at 11/16/21 1204       Allergies:  No Known Allergies    Problem List:    Patient Active Problem List   Diagnosis Code    Colitis K52.9    Proctitis K62.89    Diarrhea of presumed infectious origin R19.7    Left ovarian cyst N83.202    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    Enterocolitis K52.9    Primary insomnia F51.01    Asymptomatic menopausal state  Z78.0    Gastrointestinal hemorrhage with hematemesis K92.0    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 (NSAID) K31.9, T39.395A    Post herpetic neuralgia B02.29    Gastrointestinal hemorrhage K92.2    Blood loss anemia D50.0       Past Medical History:        Diagnosis Date    Acid reflux     Shingles        Past Surgical History:        Procedure Laterality Date    APPENDECTOMY      CHOLECYSTECTOMY      HYSTERECTOMY         Social History:    Social History Tobacco Use    Smoking status: Never Smoker    Smokeless tobacco: Never Used   Substance Use Topics    Alcohol use: No                                Counseling given: Not Answered      Vital Signs (Current):   Vitals:    11/16/21 0408 11/16/21 0441 11/16/21 0625 11/16/21 0804   BP:  (!) 110/49 (!) 108/55 (!) 102/49   Pulse:  86 78 83   Resp:  16 16 16   Temp:  98.8 °F (37.1 °C) 98.9 °F (37.2 °C) 98.4 °F (36.9 °C)   TempSrc:  Oral Oral Oral   SpO2:  97% 97% 96%   Weight: 121 lb (54.9 kg)      Height:                                                  BP Readings from Last 3 Encounters:   11/16/21 (!) 102/49   11/16/21 (!) 92/50   08/02/21 138/76       NPO Status:                                                                                 BMI:   Wt Readings from Last 3 Encounters:   11/16/21 121 lb (54.9 kg)   08/02/21 119 lb (54 kg)   03/15/21 119 lb 12.8 oz (54.3 kg)     Body mass index is 22.13 kg/m². CBC:   Lab Results   Component Value Date    WBC 6.7 11/15/2021    RBC 2.94 11/15/2021    HGB 8.0 11/16/2021    HCT 25.2 11/16/2021    MCV 99.3 11/15/2021    RDW 13.0 11/15/2021     11/15/2021       CMP:   Lab Results   Component Value Date     11/16/2021    K 4.2 11/16/2021     11/16/2021    CO2 21 11/16/2021    BUN 45 11/16/2021    CREATININE 0.79 11/16/2021    GFRAA >60 11/16/2021    LABGLOM >60 11/16/2021    GLUCOSE 91 11/16/2021    PROT 6.2 11/15/2021    CALCIUM 8.0 11/16/2021    BILITOT 0.27 11/15/2021    ALKPHOS 51 11/15/2021    AST 20 11/15/2021    ALT 16 11/15/2021       POC Tests: No results for input(s): POCGLU, POCNA, POCK, POCCL, POCBUN, POCHEMO, POCHCT in the last 72 hours.     Coags:   Lab Results   Component Value Date    PROTIME 14.4 11/15/2021    INR 1.1 11/15/2021    APTT 24.7 11/15/2021       HCG (If Applicable): No results found for: PREGTESTUR, PREGSERUM, HCG, HCGQUANT     ABGs: No results found for: PHART, PO2ART, TMT1YSZ, GHD5UGE, BEART, L3FAFWZW     Type & Screen (If Applicable):  No results found for: LABABO, LABRH    Drug/Infectious Status (If Applicable):  Lab Results   Component Value Date    HEPCAB NONREACTIVE 08/05/2021       COVID-19 Screening (If Applicable): No results found for: COVID19        Anesthesia Evaluation    Airway: Mallampati: I  TM distance: >3 FB   Neck ROM: full  Mouth opening: > = 3 FB Dental:          Pulmonary:       (-) shortness of breath                           Cardiovascular:        (-)  angina                Neuro/Psych:               GI/Hepatic/Renal:             Endo/Other:                     Abdominal:             Vascular:           Other Findings:             Anesthesia Plan      general     ASA 2                                 Vickie Oakley MD   11/16/2021

## 2021-11-16 NOTE — CONSULTS
GI Consult Note:    Name: Marylene Sizer  MRN: [id number]     [place]: [de-identified]  Room: 2009/2009-02    Admit Date: 11/15/2021  PCP: Deion uZluaga MD    Physician Requesting Consult: Ari Butt MD     Reason for Consult:      Hematemesis  Questionable coffee-ground emesis  Severe anemia  Abdominal discomfort  Diarrhea  Melena  GERD      Chief Complaint:     Chief Complaint   Patient presents with    Emesis     this morning, coffee ground       History Obtained From:     Patient and EMR    History of Present Illness:      Marylene Sizer is a  67 y.o.  female who presents with Emesis (this morning, coffee ground)    This 58-year-old elderly  female was brought to the emergency room with history for what appears to be coffee-ground emesis/hematemesis multiple episodes today she said that she vomited large amount of blood? She is a little slow and  speaking as well but communicates in [place] according to patient she had some diarrhea as well with black-colored stools  Abdominal pain and discomfort bloating and gas  Since hospitalization she is not vomiting  Initially hemoglobin was between 6 and 7 now it is above 7 after unit of transfusion  Never had GI work-up done in the past  She has history for chronic GERD  She denies smoking alcohol abuse illicit drug usage  Past history for NSAID usage  No known family history for colon cancer  Symptoms:  Onset:  Location:  abdomen  Duration:  day(s)  Severity:  moderate  Quality:  intermittent      Past Medical History:     Past Medical History:   Diagnosis Date    Acid reflux     Shingles         Past Surgical History:     Past Surgical History:   Procedure Laterality Date    APPENDECTOMY      CHOLECYSTECTOMY      HYSTERECTOMY          Medications Prior to Admission:       Prior to Admission medications    Medication Sig Start Date End Date Taking?  Authorizing Provider   traMADol (ULTRAM) 50 MG tablet TAKE ONE TABLET BY MOUTH TWICE A DAY 11/15/21 12/15/21  Rosalba Lynn MD   diclofenac (VOLTAREN) 50 MG EC tablet TAKE ONE TABLET BY MOUTH THREE TIMES A DAY 11/1/21   Rosalba Lynn MD   tiZANidine (ZANAFLEX) 2 MG tablet TAKE ONE TABLET BY MOUTH EVERY 8 HOURS AS NEEDED 11/1/21   Rosalba Lynn MD   gabapentin (NEURONTIN) 800 MG tablet Take 1 tablet by mouth 3 times daily for 30 days.  7/13/21 8/12/21  Rosalba Lynn MD   melatonin 3 MG TABS tablet Take 1 tablet by mouth daily 7/13/21   Rosalba Lynn MD   sertraline (ZOLOFT) 25 MG tablet TAKE ONE TABLET BY MOUTH DAILY 7/13/21   Rosalba Lynn MD   aspirin (ASPIRIN ADULT LOW STRENGTH) 81 MG EC tablet TAKE ONE TABLET BY MOUTH DAILY 7/13/21   Rosalba Lynn MD   calcium carbonate 600 MG TABS tablet TAKE ONE TABLET BY MOUTH TWICE A DAY WITH VITAMIN D3 500 MG. 7/13/21   Rosalba Lynn MD   naloxone 4 MG/0.1ML LIQD nasal spray 1 spray by Nasal route as needed for Opioid Reversal 5/24/21   Rosalba Lynn MD   naloxone 4 MG/0.1ML LIQD nasal spray 1 spray by Nasal route as needed for Opioid Reversal 4/20/21   Rosalba Lynn MD   naloxone 4 MG/0.1ML LIQD nasal spray 1 spray by Nasal route as needed for Opioid Reversal 3/17/21   Rosalba Lynn MD   CALCIUM PLUS VITAMIN D3 600-500 MG-UNIT CAPS TAKE 1 CAPSULE BY MOUTH TWICE A DAY 8/3/20   Rosalba Lynn MD   traZODone (DESYREL) 50 MG tablet Take 1 tablet by mouth nightly  Patient not taking: Reported on 12/14/2020 6/11/20   Rosalba Lynn MD   cimetidine (TAGAMET) 800 MG tablet Take 1 tablet by mouth 2 times daily  Patient not taking: Reported on 12/14/2020 6/11/20   Rosalba Lynn MD   Probiotic Acidophilus [facility]) TABS Take 1 tablet by mouth daily  Patient not taking: Reported on 12/14/2020 6/11/20   Rosalba Lynn MD   Lactobacillus (ACIDOPHILUS) CAPS capsule TAKE 1 CAPSULE EVERY DAY 9/6/19   Historical Provider, MD   chlorhexidine (PERIDEX) 0.12 % solution Take 15 mLs by mouth 2 times daily    Historical Provider, MD carotid bruits, thyroid not palpable  Chest - clear to auscultation, normal effort  Heart - normal rate, regular rhythm, no murmurs  Abdomen - soft, mild tenderness, nondistended, bowel sounds present all four quadrants, no masses, hepatomegaly or splenomegaly.  No hernias  Neurological - normal speech, no focal findings or movement disorder noted, cranial nerves II through XII grossly intact  Extremities - peripheral pulses palpable, no pedal edema or calf pain with palpation  Skin - no gross lesions, rashes, or induration noted  Cranial Nerves : grossly intact  Lymph nodes: not done at this time    Data:   CBC:   Lab Results   Component Value Date    WBC 6.7 11/15/2021    RBC 2.94 11/15/2021    HGB 8.0 11/16/2021    HCT 25.2 11/16/2021    MCV 99.3 11/15/2021    MCH 31.0 11/15/2021    MCHC 31.2 11/15/2021    RDW 13.0 11/15/2021     11/15/2021    MPV 10.9 11/15/2021     CBC with Differential:    Lab Results   Component Value Date    WBC 6.7 11/15/2021    RBC 2.94 11/15/2021    HGB 8.0 11/16/2021    HCT 25.2 11/16/2021     11/15/2021    MCV 99.3 11/15/2021    MCH 31.0 11/15/2021    MCHC 31.2 11/15/2021    RDW 13.0 11/15/2021    LYMPHOPCT 13 11/15/2021    MONOPCT 6 11/15/2021    BASOPCT 0 11/15/2021    MONOSABS 0.37 11/15/2021    LYMPHSABS 0.90 11/15/2021    EOSABS <0.03 11/15/2021    BASOSABS 0.03 11/15/2021    DIFFTYPE NOT REPORTED 11/15/2021     Hemoglobin/Hematocrit:    Lab Results   Component Value Date    HGB 8.0 11/16/2021    HCT 25.2 11/16/2021     CMP:    Lab Results   Component Value Date     11/16/2021    K 4.2 11/16/2021     11/16/2021    CO2 21 11/16/2021    BUN 45 11/16/2021    CREATININE 0.79 11/16/2021    GFRAA >60 11/16/2021    LABGLOM >60 11/16/2021    GLUCOSE 91 11/16/2021    PROT 6.2 11/15/2021    LABALBU 3.9 11/15/2021    CALCIUM 8.0 11/16/2021    BILITOT 0.27 11/15/2021    ALKPHOS 51 11/15/2021    AST 20 11/15/2021    ALT 16 11/15/2021     BMP:    Lab Results   Component Value Date     11/16/2021    K 4.2 11/16/2021     11/16/2021    CO2 21 11/16/2021    BUN 45 11/16/2021    LABALBU 3.9 11/15/2021    CREATININE 0.79 11/16/2021    CALCIUM 8.0 11/16/2021    GFRAA >60 11/16/2021    LABGLOM >60 11/16/2021    GLUCOSE 91 11/16/2021     PT/INR:    Lab Results   Component Value Date    PROTIME 14.4 11/15/2021    INR 1.1 11/15/2021     PTT:    Lab Results   Component Value Date    APTT 24.7 11/15/2021   [APTT}    Assesment:     Primary Problem  Gastrointestinal hemorrhage with hematemesis    Active Hospital Problems    Diagnosis Date Noted    Gastrointestinal hemorrhage with hematemesis [K92.0] 11/15/2021    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 PPI IV  2. Follow-up hemoglobin hematocrit  3. Transfuse if hemoglobin drops below 7  4. We will plan EGD on her today to evaluate  The Endoscopic procedure was explained to the patient in detail  The prep and NPO were explained  All the Risks, Benefits, and Alternatives were explained  Risk of Bleeding, Perforation and Cardio Respiratory risks were explained  her questions were answered  The patient has verbalized understanding and agreement to this plan. Discussed with nursing staff          Thank you for allowing me to participate in the care of your patient. Please feel free to contact me with any questions or concerns.      Electronically signed by Alvarez Madsen MD on 11/16/2021 at 11:55 AM     Copy sent to Dr. Rupinder Escoto MD

## 2021-11-16 NOTE — FLOWSHEET NOTE
Patient was sleeping as writer entered the room (no family members present). Writer provided a silent prayer of healing, comfort, and rest during her stay. Spiritual care will follow up as needed or requested.      11/16/21 9592   Encounter Summary   Services provided to: Patient   Referral/Consult From: [facility]   (11/16/2021 Sleeping)   Complexity of Encounter Low   Length of Encounter 15 minutes   Routine   Type Initial   Assessment Sleeping   Intervention Prayer

## 2021-11-16 NOTE — OP NOTE
PROCEDURE NOTE    DATE OF PROCEDURE: 11/16/2021     SURGEON: Bhargavi Brooks MD    ASSISTANT: None    PREOPERATIVE DIAGNOSIS: COFFEE GROUND EMESIS  SEVERE ANEMIA  MELENA    POSTOPERATIVE DIAGNOSIS: As described below    OPERATION: Upper GI endoscopy with Biopsy    ANESTHESIA: MAC PER ANESTHESIA     ESTIMATED BLOOD LOSS: Less than 50 ml    COMPLICATIONS: None. SPECIMENS:  Was Obtained:     HISTORY: The patient is a 67y.o. year old female with history of above preop diagnosis. I recommended esophagogastroduodenoscopy with possible biopsy and I explained the risk, benefits, expected outcome, and alternatives to the procedure. Risks included but are not limited to bleeding, infection, respiratory distress, hypotension, and perforation of the esophagus, stomach, or duodenum. Patient understands and is in agreement. PROCEDURE: The patient was given IV conscious sedation. The patient's SPO2 remained above 90% throughout the procedure. The gastroscope was inserted orally and advanced under direct vision through the esophagus, through the stomach, through the pylorus, and into the descending duodenum. Findings:    Retropharyngeal area was grossly normal appearing    Esophagus: abnormal: TERTIARY CONTRACTIONS  EVIDENCE OF MILD IRREGULAR SCJ BIOPSIES AND PICTURES WERE TAKEN  SMALL ONE CM HIATAL HERIA    Stomach:    Fundus: normal    Body: normal    Antrum: abnormal: EVIDENCE OF FOUR 5 MM ULCERS WITH EDEMA AND WHITE EXUDATES  NO OVERT BLEEDING  ? NSAIDS RELATED  BIOPSIES WERE TAKEN    Duodenum:     Descending: normal    Bulb: normal    The scope was removed and the patient tolerated the procedure well. Recommendations/Plan:   1. F/U Biopsies  2. F/U In Office in 3-4 weeks  3. Discussed with the family  4.  Post sedation patient was stable with stable vital signs and stable O2 saturations    Electronically signed by Bhargavi Brooks MD  on 11/16/2021 at 12:09 PM

## 2021-11-16 NOTE — FLOWSHEET NOTE
Pt admitted to room 2009 from ED  Oriented to room and surroundings  Bed in lowest position, wheels locked, 2/4 side rails up  Call light in reach, room free of clutter, adequate lighting provided  Denies any further questions at this time  Instructed to call out with any questions/concerns/new onset of pain and/or n/v   White board updated  Continue to monitor with hourly rounding  STAY WITH ME protocol initiated   Bed alarm on/Fall Risk signs in place/Fall risk sticker to wrist band  Non-skid socks on.

## 2021-11-16 NOTE — FLOWSHEET NOTE
11/16/21 0441   Vitals   BP (!) 110/49   Temp 98.8 °F (37.1 °C)   Temp Source Oral   Pulse 86   Resp 16   SpO2 97 %     Unit of RBC's infusing without any signs or symptoms of transfusion reaction noted. VSS. Will continue to monitor.

## 2021-11-16 NOTE — ANESTHESIA POSTPROCEDURE EVALUATION
Department of Anesthesiology  Postprocedure Note    Patient: Kami Hawley  MRN: [id number]  YOB: 1945  Date of evaluation: 11/16/2021  Time:  1:40 PM     Procedure Summary     Date: 11/16/21 Room / Location: [place] [address] / [facility] - INPATIENT    Anesthesia Start: 3254 Anesthesia Stop: 6916    Procedure: EGD BIOPSY (N/A ) Diagnosis: (hemetemesis)    Surgeons: Yoselin Taylor MD Responsible Provider: Marek Nicolas MD    Anesthesia Type: general ASA Status: 2          Anesthesia Type: general    Tushar Phase I:      Tushar Phase II: Tushar Score: 8    Last vitals: Reviewed and per EMR flowsheets.        Anesthesia Post Evaluation    Complications: no

## 2021-11-16 NOTE — PROGRESS NOTES
[facility]  Office: [address], DO, [place], DO, Edu Kate, DO, Alli Yazan Blood, DO, Cristi Marcum MD, Mary Saez MD, Jessee Mejia MD, Adrian Brody MD, Oren Klein MD, Gerri Pichardo MD, Patrick Suazo MD, Kalen Bird, DO, Will Christopher DO, Mae Shukla MD,  Lucy Champagne DO, Xu Maldonado MD, Kevin Bell MD, Kelvin Raphael MD, Clark Ortiz MD, Claudeen Hook, MD, Erwin Block MD, Migel Ramos MD, Alfredo Han, [facility], [facility], [facility], Lissa Ahn, CNP, Marylene Bucy, CNS, Elsa River, CNP, Carmine Patrick, CNP, Layla Barajas, CNP, Panda Flanagan, CNP, Guan[place]ito Pemberton, CNP, Jessee Cobian PA-C, Rosy Rodriguez, [facility], Rikki Dotson, CNP, Alyssa Tolbert, CNP, Lior Hickman, CNP, Tr Dickey, CNP, Rajesh Goins, CNP, Isabella Lopez, CNP, [facility]      Daily Progress Note     Admit Date: 11/15/2021  Bed/Room No.  2009/2009-02  Admitting Physician : Jessee Mejia MD  Code Status :Full Code  Hospital Day:  LOS: 1 day   Chief Complaint:     Chief Complaint   Patient presents with    Emesis     this morning, coffee ground     Principal Problem:    Gastrointestinal hemorrhage with hematemesis  Active Problems:    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 (NSAID)    Post herpetic neuralgia  Resolved Problems:    * No resolved hospital problems. *    Subjective : Interval History/Significant events :  11/16/21    Patient underwent EGD today. No blood in stool. Patient was treated with Protonix infusion. EGD showed 5 mm ulcer with edema. Patient is complaining of pain in the upper chest on right side. She has history of shingles before. Vitals - Stable afebrile, Low BP   Labs -hemoglobin 7.8, creatinine 0.79. Nursing notes , Consults notes reviewed. Overnight events and updates discussed with Nursing staff .    Background History:         Itzel Amin is 67 y.o. female  Who was admitted to the hospital on 11/15/2021 for treatment of Gastrointestinal hemorrhage with hematemesis. Patient presented to emergency room with coffee ground emesis associated with nausea, lightheadedness. Patient also reported loose stool, abdominal cramps, lightheadedness and passed out in the bathroom. Patient has been taking diclofenac 50 mg 3 times daily and tramadol 50 mg twice daily for arthritis. Work-up in ED showed tachycardia 90s to 100, SBP 90s, hemoglobin 9.1. CT abdomen pelvis showed asymmetric nodularity mucosal hyperenhancement along the posterior wall of pylorus concerning for peptic ulcer disease with 8 mm left renal cyst.     PMH:  Past Medical History:   Diagnosis Date    Acid reflux     Shingles       Allergies: No Known Allergies   Medications :  chlorhexidine, 15 mL, Mouth/Throat, BID  sodium chloride flush, 5-40 mL, IntraVENous, 2 times per day  influenza virus vaccine, 0.5 mL, IntraMUSCular, Prior to discharge        Review of Systems   Review of Systems   Constitutional: Negative for appetite change, fatigue, fever and unexpected weight change. HENT: Negative for congestion, rhinorrhea and sneezing. Eyes: Negative for visual disturbance. Respiratory: Negative for cough, chest tightness, shortness of breath and wheezing. Cardiovascular: Negative for chest pain and palpitations. Gastrointestinal: Negative for abdominal pain, blood in stool, constipation, diarrhea, nausea and vomiting. Genitourinary: Negative for dysuria, enuresis, frequency and hematuria. Musculoskeletal: Negative for arthralgias and myalgias. Skin: Negative for rash. Pain in Right side Chest    Neurological: Negative for dizziness, weakness, light-headedness and headaches. Hematological: Does not bruise/bleed easily. Psychiatric/Behavioral: Negative for dysphoric mood and sleep disturbance.      Objective :      Current Vitals : Temp: 98.4 °F (36.9 °C),  Pulse: 83, Resp: 16, BP: (!) 102/49, SpO2: 96 %  Last 24 Hrs Vitals   Patient Vitals for the past 24 hrs:   BP Temp Temp src Pulse Resp SpO2 Weight   11/16/21 0804 (!) 102/49 98.4 °F (36.9 °C) Oral 83 16 96 % --   11/16/21 0625 (!) 108/55 98.9 °F (37.2 °C) Oral 78 16 97 % --   11/16/21 0441 (!) 110/49 98.8 °F (37.1 °C) Oral 86 16 97 % --   11/16/21 0408 -- -- -- -- -- -- 121 lb (54.9 kg)   11/16/21 0344 (!) 115/50 98.7 °F (37.1 °C) Oral 88 16 99 % --   11/15/21 2317 (!) 102/59 98.9 °F (37.2 °C) Oral 86 18 100 % --   11/15/21 1946 (!) 120/53 98.3 °F (36.8 °C) Oral 95 18 98 % --     Intake / output   11/15 0701 - 11/16 0700  In: 1091.7 [I.V.:880]  Out: 850 [Urine:850]  Physical Exam:  Physical Exam  Vitals and nursing note reviewed. Constitutional:       General: She is not in acute distress. Appearance: She is not diaphoretic. HENT:      Head: Normocephalic and atraumatic. Nose:      Right Sinus: No maxillary sinus tenderness or frontal sinus tenderness. Left Sinus: No maxillary sinus tenderness or frontal sinus tenderness. Mouth/Throat:      Pharynx: No oropharyngeal exudate. Eyes:      General: No scleral icterus. Conjunctiva/sclera: Conjunctivae normal.      Pupils: Pupils are equal, round, and reactive to light. Neck:      Thyroid: No thyromegaly. Vascular: No JVD. Cardiovascular:      Rate and Rhythm: Normal rate and regular rhythm. Pulses:           Dorsalis pedis pulses are 2+ on the right side and 2+ on the left side. Heart sounds: Normal heart sounds. No murmur heard. Pulmonary:      Effort: Pulmonary effort is normal.      Breath sounds: Normal breath sounds. No wheezing or rales. Abdominal:      Palpations: Abdomen is soft. There is no mass. Tenderness: There is no abdominal tenderness. Musculoskeletal:      Cervical back: Full passive range of motion without pain and neck supple. Lymphadenopathy:      Head:      Right side of head: No submandibular adenopathy.       Left side of head: No submandibular adenopathy. Cervical: No cervical adenopathy. Skin:     General: Skin is warm. Neurological:      Mental Status: She is alert and oriented to person, place, and time. Motor: No tremor. Psychiatric:         Behavior: Behavior is cooperative. Laboratory findings:    Recent Labs     11/15/21  0858 11/15/21  0858 11/15/21  2010 11/16/21  0239 11/16/21  0740   WBC 6.7  --   --   --   --    HGB 9.1*   < > 7.2* 6.4* 8.0*   HCT 29.2*   < > 22.7* 20.3* 25.2*     --   --   --   --    INR 1.1  --   --   --   --     < > = values in this interval not displayed. Recent Labs     11/15/21  0858 11/16/21  0740    143   K 4.6 4.2    112*   CO2 25 21   GLUCOSE 99 91   BUN 65* 45*   CREATININE 0.77 0.79   CALCIUM 9.7 8.0*     Recent Labs     11/15/21  0858   PROT 6.2*   LABALBU 3.9   AST 20   ALT 16   ALKPHOS 51   BILITOT 0.27*   LIPASE 31          Specific Gravity, UA   Date Value Ref Range Status   11/15/2021 1.010 1.005 - 1.030 Final     Protein, UA   Date Value Ref Range Status   11/15/2021 NEGATIVE NEGATIVE Final     RBC, UA   Date Value Ref Range Status   11/15/2021 2 TO 5 0 - 2 /HPF Final     Blood, UA POC   Date Value Ref Range Status   10/13/2018 negative  Final     Bacteria, UA   Date Value Ref Range Status   11/15/2021 NOT REPORTED None Final     Nitrite, Urine   Date Value Ref Range Status   11/15/2021 NEGATIVE NEGATIVE Final     WBC, UA   Date Value Ref Range Status   11/15/2021 0 TO 2 0 - 5 /HPF Final     Leukocyte Esterase, Urine   Date Value Ref Range Status   11/15/2021 NEGATIVE NEGATIVE Final       Imaging / Clinical Data :-   CT ABDOMEN PELVIS W IV CONTRAST Additional Contrast? None    Result Date: 11/15/2021  1. There focal asymmetric a nodularity mucosal hyperenhancement along posterior wall the pylorus raising concern for peptic ulcer disease or other pathology.   Endoscopic correlation advised 2. 8 mm left renal cysts and additional tiny

## 2021-11-17 LAB
ANION GAP SERPL CALCULATED.3IONS-SCNC: 10 MMOL/L (ref 9–17)
BUN BLDV-MCNC: 24 MG/DL (ref 8–23)
BUN/CREAT BLD: 33 (ref 9–20)
CALCIUM SERPL-MCNC: 7.8 MG/DL (ref 8.6–10.4)
CHLORIDE BLD-SCNC: 110 MMOL/L (ref 98–107)
CO2: 21 MMOL/L (ref 20–31)
CREAT SERPL-MCNC: 0.72 MG/DL (ref 0.5–0.9)
GFR AFRICAN AMERICAN: >60 ML/MIN
GFR NON-AFRICAN AMERICAN: >60 ML/MIN
GFR SERPL CREATININE-BSD FRML MDRD: ABNORMAL ML/MIN/{1.73_M2}
GFR SERPL CREATININE-BSD FRML MDRD: ABNORMAL ML/MIN/{1.73_M2}
GLUCOSE BLD-MCNC: 85 MG/DL (ref 70–99)
HCT VFR BLD CALC: 21.4 % (ref 36.3–47.1)
HCT VFR BLD CALC: 22.3 % (ref 36.3–47.1)
HCT VFR BLD CALC: 25.7 % (ref 36.3–47.1)
HCT VFR BLD CALC: 30.1 % (ref 36.3–47.1)
HEMOGLOBIN: 7 G/DL (ref 11.9–15.1)
HEMOGLOBIN: 7.1 G/DL (ref 11.9–15.1)
HEMOGLOBIN: 8.1 G/DL (ref 11.9–15.1)
HEMOGLOBIN: 9.3 G/DL (ref 11.9–15.1)
POTASSIUM SERPL-SCNC: 3.7 MMOL/L (ref 3.7–5.3)
SODIUM BLD-SCNC: 141 MMOL/L (ref 135–144)
SURGICAL PATHOLOGY REPORT: NORMAL

## 2021-11-17 PROCEDURE — APPSS30 APP SPLIT SHARED TIME 16-30 MINUTES: Performed by: NURSE PRACTITIONER

## 2021-11-17 PROCEDURE — 2580000003 HC RX 258: Performed by: INTERNAL MEDICINE

## 2021-11-17 PROCEDURE — 97112 NEUROMUSCULAR REEDUCATION: CPT

## 2021-11-17 PROCEDURE — 36415 COLL VENOUS BLD VENIPUNCTURE: CPT

## 2021-11-17 PROCEDURE — 99232 SBSQ HOSP IP/OBS MODERATE 35: CPT | Performed by: FAMILY MEDICINE

## 2021-11-17 PROCEDURE — 85018 HEMOGLOBIN: CPT

## 2021-11-17 PROCEDURE — 99232 SBSQ HOSP IP/OBS MODERATE 35: CPT | Performed by: INTERNAL MEDICINE

## 2021-11-17 PROCEDURE — 6370000000 HC RX 637 (ALT 250 FOR IP): Performed by: INTERNAL MEDICINE

## 2021-11-17 PROCEDURE — 97530 THERAPEUTIC ACTIVITIES: CPT

## 2021-11-17 PROCEDURE — 1200000000 HC SEMI PRIVATE

## 2021-11-17 PROCEDURE — 85014 HEMATOCRIT: CPT

## 2021-11-17 PROCEDURE — 80048 BASIC METABOLIC PNL TOTAL CA: CPT

## 2021-11-17 PROCEDURE — 97162 PT EVAL MOD COMPLEX 30 MIN: CPT

## 2021-11-17 PROCEDURE — 6370000000 HC RX 637 (ALT 250 FOR IP): Performed by: FAMILY MEDICINE

## 2021-11-17 PROCEDURE — 2580000003 HC RX 258: Performed by: NURSE PRACTITIONER

## 2021-11-17 PROCEDURE — 97116 GAIT TRAINING THERAPY: CPT

## 2021-11-17 RX ORDER — PANTOPRAZOLE SODIUM 40 MG/1
40 TABLET, DELAYED RELEASE ORAL
Qty: 30 TABLET | Refills: 3 | Status: SHIPPED | OUTPATIENT
Start: 2021-11-17 | End: 2021-12-16 | Stop reason: SDUPTHER

## 2021-11-17 RX ORDER — FERROUS SULFATE 325(65) MG
325 TABLET ORAL 2 TIMES DAILY
Qty: 180 TABLET | Refills: 1 | Status: SHIPPED | OUTPATIENT
Start: 2021-11-17

## 2021-11-17 RX ORDER — 0.9 % SODIUM CHLORIDE 0.9 %
1000 INTRAVENOUS SOLUTION INTRAVENOUS ONCE
Status: COMPLETED | OUTPATIENT
Start: 2021-11-17 | End: 2021-11-17

## 2021-11-17 RX ADMIN — SERTRALINE 25 MG: 25 TABLET, FILM COATED ORAL at 10:09

## 2021-11-17 RX ADMIN — PANTOPRAZOLE SODIUM 40 MG: 40 TABLET, DELAYED RELEASE ORAL at 16:17

## 2021-11-17 RX ADMIN — SODIUM CHLORIDE 1000 ML: 9 INJECTION, SOLUTION INTRAVENOUS at 03:33

## 2021-11-17 RX ADMIN — CHLORHEXIDINE GLUCONATE 15 ML: 1.2 RINSE ORAL at 21:53

## 2021-11-17 RX ADMIN — CHLORHEXIDINE GLUCONATE 15 ML: 1.2 RINSE ORAL at 10:10

## 2021-11-17 RX ADMIN — TIZANIDINE 4 MG: 4 TABLET ORAL at 21:53

## 2021-11-17 RX ADMIN — PANTOPRAZOLE SODIUM 40 MG: 40 TABLET, DELAYED RELEASE ORAL at 10:09

## 2021-11-17 RX ADMIN — SODIUM CHLORIDE, PRESERVATIVE FREE 10 ML: 5 INJECTION INTRAVENOUS at 21:53

## 2021-11-17 RX ADMIN — GABAPENTIN 800 MG: 400 CAPSULE ORAL at 16:17

## 2021-11-17 RX ADMIN — GABAPENTIN 800 MG: 400 CAPSULE ORAL at 10:09

## 2021-11-17 RX ADMIN — SODIUM CHLORIDE: 9 INJECTION, SOLUTION INTRAVENOUS at 21:53

## 2021-11-17 RX ADMIN — GABAPENTIN 800 MG: 400 CAPSULE ORAL at 21:53

## 2021-11-17 RX ADMIN — SODIUM CHLORIDE: 9 INJECTION, SOLUTION INTRAVENOUS at 10:09

## 2021-11-17 ASSESSMENT — ENCOUNTER SYMPTOMS
DIARRHEA: 0
BLOOD IN STOOL: 0
VOMITING: 0
NAUSEA: 0
ABDOMINAL PAIN: 0
SHORTNESS OF BREATH: 0
WHEEZING: 0
CHEST TIGHTNESS: 0
RHINORRHEA: 0
CONSTIPATION: 0
COUGH: 0

## 2021-11-17 ASSESSMENT — PAIN SCALES - GENERAL: PAINLEVEL_OUTOF10: 0

## 2021-11-17 NOTE — PROGRESS NOTES
GI Progress notes    11/17/2021   2:34 PM    Name:  Christos Botello  MRN:    [id number]     Acct:     [de-identified]   Room:  2009/2009-02  IP Day: 2     Admit Date: 11/15/2021  8:15 AM  PCP: Gregorio Arenas MD    Subjective:     C/C:   Chief Complaint   Patient presents with    Emesis     this morning, coffee ground       Interval History: Status: improved. 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 ROS:  Constitutional: negative for chills, fevers and sweats  Gastrointestinal: negative for abdominal pain, constipation, diarrhea, nausea and vomiting      Medications: Allergies: No Known Allergies    Current Meds: 0.9 % sodium chloride infusion, PRN  pantoprazole (PROTONIX) tablet 40 mg, BID AC  gabapentin (NEURONTIN) capsule 800 mg, TID  sertraline (ZOLOFT) tablet 25 mg, Daily  tiZANidine (ZANAFLEX) tablet 4 mg, Nightly  traZODone (DESYREL) tablet 50 mg, Nightly  traMADol (ULTRAM) tablet 50 mg, Q6H PRN   Or  traMADol (ULTRAM) tablet 100 mg, Q6H PRN  chlorhexidine (PERIDEX) 0.12 % solution 15 mL, BID  0.9 % sodium chloride infusion, Continuous  sodium chloride flush 0.9 % injection 5-40 mL, 2 times per day  sodium chloride flush 0.9 % injection 5-40 mL, PRN  0.9 % sodium chloride infusion, PRN  ondansetron (ZOFRAN-ODT) disintegrating tablet 4 mg, Q8H PRN   Or  ondansetron (ZOFRAN) injection 4 mg, Q6H PRN  acetaminophen (TYLENOL) tablet 650 mg, Q6H PRN   Or  acetaminophen (TYLENOL) suppository 650 mg, Q6H PRN  morphine (PF) injection 1 mg, Q4H PRN  influenza quadrivalent split vaccine (FLUZONE;FLUARIX;FLULAVAL;AFLURIA) injection 0.5 mL, Prior to discharge        Data:     Code Status:  Full Code    History reviewed. No pertinent family history.     Social History     Socioeconomic History    Marital status:      Spouse name: Not on file    Number of children: Not on file    Years of education: Not on file    Highest education level: Not on file   Occupational History    Not on file   Tobacco Use    Smoking status: Never Smoker    Smokeless tobacco: Never Used   Vaping Use    Vaping Use: Never used   Substance and Sexual Activity    Alcohol use: No    Drug use: No    Sexual activity: Not on file   Other Topics Concern    Not on file   Social History Narrative    Not on file     Social Determinants of Health     Financial Resource Strain:     Difficulty of Paying Living Expenses: Not on file   Food Insecurity:     Worried About Running Out of Food in the Last Year: Not on file    Nieves of Food in the Last Year: Not on file   Transportation Needs:     Lack of Transportation (Medical): Not on file    Lack of Transportation (Non-Medical):  Not on file   Physical Activity:     Days of Exercise per Week: Not on file    Minutes of Exercise per Session: Not on file   Stress:     Feeling of Stress : Not on file   Social Connections:     Frequency of Communication with Friends and Family: Not on file    Frequency of Social Gatherings with Friends and Family: Not on file    Attends Jew Services: Not on file    Active Member of [address] [place] [address] or Organizations: Not on file    Attends Club or Organization Meetings: Not on file    Marital Status: Not on file   Intimate Partner Violence:     Fear of Current or Ex-Partner: Not on file    Emotionally Abused: Not on file    Physically Abused: Not on file    Sexually Abused: Not on file   Housing Stability:     Unable to Pay for Housing in the Last Year: Not on file    Number of [place] in the Last Year: Not on file    Unstable Housing in the Last Year: Not on file       Vitals:  BP (!) 106/45   Pulse 79   Temp 98.6 °F (37 °C) (Oral)   Resp 16   Ht 5' 2\" (1.575 m)   Wt 121 lb (54.9 kg)   SpO2 98%   BMI 22.13 kg/m²   Temp (24hrs), Av.5 °F (36.9 °C), Date    PROTIME 14.4 11/15/2021    INR 1.1 11/15/2021     PTT:    Lab Results   Component Value Date    APTT 24.7 11/15/2021   [APTT}    Physical Examination:        General appearance: alert, cooperative and no distress  Mental Status: oriented to person, place and time and normal affect  Abdomen: soft, nontender, nondistended, bowel sounds present     Assessment:        Primary Problem  Gastrointestinal hemorrhage with hematemesis     Active Hospital Problems    Diagnosis Date Noted    Gastrointestinal hemorrhage [K92.2]     Blood loss anemia [D50.0]     Gastrointestinal hemorrhage with hematemesis [K92.0] 11/15/2021    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 (NSAID) [K31.9, T80.746E] 11/15/2021    Post herpetic neuralgia [B02.29] 11/15/2021     Past Medical History:   Diagnosis Date    Acid reflux     Shingles         Plan:        1. Nausea, vomiting, coffee ground emesis, anemia s/p EGD noting multiple 5 mm ulcers with edema, white exudates in the antrum, irregular SCJ consistent with Tapia's  1. Diet as tolerated  2. Continue PPI therapy  3. Avoid NSAIDs  4. Outpatient follow-up, outpatient colonoscopy  5.  May d/c per GI    Explained to the patient and d/W Nursing Staff  Will F/U with you  Please call or Page for any issues or change in status  Thanks    Electronically signed by JOSELYN Mosher NP on 11/17/2021 at 2:34 PM

## 2021-11-17 NOTE — DISCHARGE INSTR - COC
Continuity of Care Form    Patient Name: Christos Botello   :    MRN:  [id number]    Admit date:  11/15/2021  Discharge date:  2021    Code Status Order: Full Code   Advance Directives:      Admitting Physician:  Fortino Sepulveda MD  PCP: Gregorio Arenas MD    Discharging Nurse:   [address] Unit/Room#:   Discharging Unit Phone Number: [phone]    Emergency Contact:   Extended Emergency Contact Information  Primary Emergency Contact: Deanna Adams  Address: [facility] [address] [place] [address] [address] Phone: [phone]  Relation: Child  Preferred language: English  Secondary Emergency Contact: Jaz Meléndez  Address: [address] ,[address]           [place], [address] [place] [address]  Home Phone: [phone]  Relation: Child    Past Surgical History:  Past Surgical History:   Procedure Laterality Date    APPENDECTOMY      CHOLECYSTECTOMY      HYSTERECTOMY      UPPER GASTROINTESTINAL ENDOSCOPY N/A 2021    EGD BIOPSY performed by Mckenzie Lam MD at [facility] History:   Immunization History   Administered Date(s) Administered    COVID-19, Pfizer, PF, 30mcg/0.3mL 2021, 2021    Influenza, Quadv, 6 mo and older, IM, PF (Flulaval, Fluarix) 10/15/2018    Influenza, Quadv, adjuvanted, 65 yrs +, IM, PF (Fluad) 2020    Influenza, Triv, inactivated, subunit, adjuvanted, IM (Fluad 65 yrs and older) 10/17/2017, 2019    Pneumococcal Conjugate 13-valent ([place]) 10/15/2018, 2020    Pneumococcal Polysaccharide (Hlmncsuut84) 2019    Tdap (Boostrix, Adacel) 2020    Zoster Recombinant (Shingrix) 2019, 2020       Active Problems:  Patient Active Problem List   Diagnosis Code    Colitis K52.9    Proctitis K62.89    Diarrhea of presumed infectious origin R19.7    Left ovarian cyst N83.202    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    Enterocolitis K52.9    Primary insomnia F51.01 Asymptomatic menopausal state  Z78.0    Gastrointestinal hemorrhage with hematemesis K92.0    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 (NSAID) K31.9, T39.395A    Post herpetic neuralgia B02.29    Gastrointestinal hemorrhage K92.2    Blood loss anemia D50.0       Isolation/Infection:   Isolation            Contact          Patient Infection Status       Infection Onset Added Last Indicated Last Indicated By Review Planned Expiration Resolved Resolved By    None active    Resolved    C-diff Rule Out 05/28/21 05/28/21 05/28/21 Gastrointestinal Panel, Molecular (Ordered)   05/29/21 Rule-Out Test Resulted            Nurse Assessment:  Last Vital Signs: BP (!) 106/45   Pulse 79   Temp 98.6 °F (37 °C) (Oral)   Resp 16   Ht 5' 2\" (1.575 m)   Wt 121 lb (54.9 kg)   SpO2 98%   BMI 22.13 kg/m²     Last documented pain score (0-10 scale): Pain Level: 0  Last Weight:   Wt Readings from Last 1 Encounters:   11/17/21 121 lb (54.9 kg)     Mental Status:  oriented and alert    IV Access:  - None    Nursing Mobility/ADLs:  Walking   Independent  Transfer  Independent  Bathing  Assisted  Dressing  Independent  Toileting  Independent  Feeding  Assisted  Med Admin  Assisted  Med Delivery   whole    Wound Care Documentation and Therapy:        Elimination:  Continence: Bowel: Yes  Bladder: Yes  Urinary Catheter: None   Colostomy/Ileostomy/Ileal Conduit: No       Date of Last BM:     Intake/Output Summary (Last 24 hours) at 11/17/2021 1332  Last data filed at 11/17/2021 1010  Gross per 24 hour   Intake 120 ml   Output 1700 ml   Net -1580 ml     I/O last 3 completed shifts: In: 100 [I.V.:100]  Out: 1350 [Urine:1350]    Safety Concerns:      At Risk for Falls    Impairments/Disabilities:      None    Nutrition Therapy:  Current Nutrition Therapy:  easy to chew     Routes of Feeding: Oral  Liquids: No Restrictions  Daily Fluid Restriction: no  Last Modified Barium Swallow with Video (Video Swallowing Test): not done    Treatments at the Time of Hospital Discharge:   Respiratory Treatments:   Oxygen Therapy:  is not on home oxygen therapy. Ventilator:    - No ventilator support    Rehab Therapies: Physical Therapy and Occupational Therapy  Weight Bearing Status/Restrictions: No weight bearing restirctions  Other Medical Equipment (for information only, NOT a DME order):  walker  Other Treatments: Skilled RN assessment  Med teaching and compliance    Patient's personal belongings (please select all that are sent with patient):  None    RN SIGNATURE:  Carly CASTRO 11-    CASE MANAGEMENT/SOCIAL WORK SECTION    Inpatient Status Date: ***    Readmission Risk Assessment Score:  Readmission Risk              Risk of Unplanned Readmission:  14           Discharging to Facility/ Agency   Name:   [facility]  FAX            [address] [address]       Phone: [phone]       Fax: [phone]            / signature: Electronically signed by Raymond Mae RN on 11/17/21 at 2:19 PM EST    PHYSICIAN SECTION    Prognosis: Good    Condition at Discharge: Stable    Rehab Potential (if transferring to Rehab): Good    Recommended Labs or Other Treatments After Discharge: PT, OT. Physician Certification: I certify the above information and transfer of Kami Hawley  is necessary for the continuing treatment of the diagnosis listed and that she requires Home Care for less 30 days.      Update Admission H&P: No change in H&P    PHYSICIAN SIGNATURE:  Electronically signed by Bert Elam MD on 11/17/21 at 2:22 PM EST

## 2021-11-17 NOTE — PROGRESS NOTES
Physical Therapy    Facility/Department: [facility]  Initial Assessment    NAME: Kami Hawley  : 1945  MRN: [id number]    Date of Service: 2021    Discharge Recommendations:  Patient would benefit from continued therapy after discharge   PT Equipment Recommendations  Equipment Needed: Yes  Mobility Devices: Robel Rear: Rolling     Pt presented to ED on 11/15/21 with nausea, vomitting lightheadedness and dizziness. Pt states that she got up to use the restroom earlier this morning and she had an episode of dark brown/black vomit. She also noticed yesterday that her stools were much darker than usual.  She states that when she had this episode of vomiting this morning she did become very lightheaded and dizzy and passed out. She denies any headache, neck or back pain. Denies taking any blood thinners or having any coagulopathies. Has never had this issue in the past.  She states that she is having some generalized abdominal pain but nowhere in particular hurts her. She does have a history of both appendectomy and cholecystectomy    Pt admitted for management of upper GI bleed    RN reports patient is medically stable for therapy treatment this date. Chart reviewed prior to treatment and patient is agreeable for therapy    Assessment   Body structures, Functions, Activity limitations: Decreased functional mobility ; Decreased safe awareness; Decreased balance; Decreased ADL status; Decreased endurance; Decreased strength  Assessment: Pt with deficits of bed mobility, transfers, ambulation, balance, safety awareness and endurance this session,  & is decline compared to prior level of function. With current deficits, Pt at risk for falls & requires continued PT to maximize independence with functional mobility, balance, safety awareness & activity tolerance.  Due to recent hospitalization and medical condition, pt would benefit from additional therapy at time of discharge to ensure safety. Please refer to the AM-PAC score for current functional status. Prognosis: Good  Decision Making: Medium Complexity  Exam: ROM, MMT, functional mobility, activity tolerance, Balance, & [employer] AM-PAC 6 Clicks Basic Mobility  Clinical Presentation: evolving  PT Education: Goals; Functional Mobility Training; Transfer Training; Gait Training; General Safety  Patient Education: Ed pt on functional mobility, safety awareness, importance of being up & OOB to regain strength, & prevention of sedentary complications, circulation & LE ex's  REQUIRES PT FOLLOW UP: Yes  Activity Tolerance  Activity Tolerance: Patient limited by fatigue; Patient limited by endurance       Patient Diagnosis(es): The encounter diagnosis was Gastrointestinal hemorrhage, unspecified gastrointestinal hemorrhage type. has a past medical history of Acid reflux and Shingles. has a past surgical history that includes Appendectomy; Cholecystectomy; Hysterectomy; and Upper gastrointestinal endoscopy (N/A, 11/16/2021). Restrictions  Restrictions/Precautions  Restrictions/Precautions: General Precautions, Fall Risk  Position Activity Restriction  Other position/activity restrictions: contact isolation, BR with BRP, telemetry, LUE IV, ext catheter  Vision/Hearing  Vision: Within Functional Limits (pt has glasses but says she does not routinely wear them.  Pt denies vision changes)  Hearing: Within functional limits     Subjective  General  Chart Reviewed: Yes  Patient assessed for rehabilitation services?: Yes  Response To Previous Treatment: Not applicable  General Comment  Comments: RN okays PT  Pain Screening  Patient Currently in Pain: Denies  Vital Signs  Patient Currently in Pain: Denies       Orientation  Orientation  Overall Orientation Status: Within Functional Limits  Social/Functional History  Social/Functional History  Lives With: Daughter  Type of Home: House  Home Layout: Two level, Bed/Bath upstairs (14 steps/no rail to access 2nd floor bed & bath)  Home Access: Stairs to enter without rails  Entrance Stairs - Number of Steps: 5  Bathroom Shower/Tub: Tub/Shower unit  Bathroom Toilet: Standard  Bathroom Equipment:  (no DME)  Home Equipment:  (no devices)  ADL Assistance: [address]: Independent  Homemaking Responsibilities: No  Ambulation Assistance: Independent  Transfer Assistance: Independent  Active : No  Patient's  Info: daughter  Occupation: Retired  Type of occupation: factory  Leisure & Hobbies: read bible  Additional Comments: Pt denies falls  Cognition   Cognition  Overall Cognitive Status: Exceptions  Arousal/Alertness: Delayed responses to stimuli  Following Commands: Follows multistep commands with repitition; Follows multistep commands with increased time  Attention Span: Appears intact  Memory: Appears intact  Safety Judgement: Decreased awareness of need for assistance; Decreased awareness of need for safety  Problem Solving: Decreased awareness of errors; Assistance required to identify errors made; Assistance required to correct errors made; Assistance required to implement solutions  Insights: Decreased awareness of deficits  Initiation: Requires cues for some  Sequencing: Does not require cues    Objective     Observation/Palpation  Posture: Fair  Observation: pt resting in bed, pt lethargic    AROM RLE (degrees)  RLE AROM: WFL  AROM LLE (degrees)  LLE AROM : WFL  AROM RUE (degrees)  RUE AROM : WFL  AROM LUE (degrees)  LUE AROM : WFL  Strength RLE  Comment: 4/5  Strength LLE  Comment: 4/5  Strength RUE  Strength RUE: WFL  Strength LUE  Strength LUE: WFL  Tone RLE  RLE Tone: Normotonic  Tone LLE  LLE Tone: Normotonic  Coordination  Movements Are Fluid And Coordinated: Yes  Sensation  Overall Sensation Status: WFL  Bed mobility  Rolling to Left: Contact guard assistance  Supine to Sit: Minimal assistance  Scooting: Minimal assistance  Comment:  Mod verbal instruction/tactile assist for UE hand placement on rail and proper log rolling tech + use of UB to scoot completely out to EOB with B foot placement to establish safe sitting balance, pursed lip breathing,  Pacing, assist with line mgt,  with increased time needed all to increase safety & reduce fall risk  Transfers  Sit to Stand: Minimal Assistance  Stand to sit: Minimal Assistance  Bed to Chair: Minimal assistance  Stand Pivot Transfers: Minimal Assistance  Lateral Transfers: Minimal Assistance  Comment: Ed + tactile assist on correct use of upper body for safe sit/stand + to back all way back to surface with walker close  + to back all way back to surface until she feels it touch behind her legs & to ensure she reaches with UB support to arms of chair  Ambulation  Ambulation?: Yes  Ambulation 1  Surface: level tile  Device: Rolling Walker  Assistance: Minimal assistance  Quality of Gait: step to pattern, cues to keep walker close at all times & to amb inside base of walker  & upright posture, pt moves slow & guarded.   Distance: 25ft, 15ft  Comments: Pt amb to BR & stood at sink for oral & facial hygiene x 8 minutes then ambulated 15 ft with R/walker & sat to chair with Min Assistance     Balance  Posture: Fair  Sitting - Static: Good  Sitting - Dynamic: Good  Standing - Static: Fair  Standing - Dynamic: Fair; -  Exercises  Comments: Ed circulation ex's, pressure relief, & ex's to move what moves to maintain strength & joint congruency     Pt completed seated ant/post & lateral WS seated & completed sit to stands x 5 to promote mobility and functional pre gait activities & completed static standing weight shifts & picking feet up off floor with UB support at R/walker to improve core strength & stability      Plan   Plan  Times per week: 1-2x/D, 5-6D/week  Current Treatment Recommendations: Strengthening, Balance Training, Functional Mobility Training, Transfer Training, Endurance Training, Gait Training, Stair training, Home Exercise Program, Safety Education & Training, Patient/Caregiver Education & Training  Safety Devices  Type of devices: Gait belt, Patient at risk for falls, Call light within reach, Chair alarm in place, Left in chair, Nurse notified    G-Code       OutComes Score                                                  AM-PAC Score  AM-PAC Inpatient Mobility Raw Score : 15 (11/17/21 1251)  AM-PAC Inpatient T-Scale Score : 39.45 (11/17/21 1251)  Mobility Inpatient CMS 0-100% Score: 57.7 (11/17/21 1251)  Mobility Inpatient CMS G-Code Modifier : CK (11/17/21 1251)          Goals  Short term goals  Time Frame for Short term goals: 12 visits  Short term goal 1: Inc bed-mobility & transfers to independent to enable pt to safely get in/OOB & chair  Short term goal 2: Inc gait to amb 350ft or > indep w/ RW to enable pt to return to previous level of independence  Short term goal 3: Pt able to go up/down 10 steps & supervision  Short term goal 4: Inc strength to [address] standing balance to good with device to facilitate pt independence for performance of ADL's & functional mobility, & reduce fall risk  Short term goal 5:  Inc strength to [address] standing balance to good with device to facilitate pt independence for performance of ADL's & functional mobility, & reduce fall risk  Short term goal 6: Ed pt on home ex's, safety & energy principles, fall prevention, & issue written home program       Therapy Time   Individual Concurrent Group Co-treatment   Time In 1152         Time Out 1251         Minutes 59+10=69              Additional 10 minutes for chart review      Treatment time: 55 minutes      [address], PT

## 2021-11-17 NOTE — PROGRESS NOTES
[facility]  Office: [address], DO, Royce Moriah, DO, Brandie Maple, DO, Anderrose Garcia, DO, Anika Tong MD, Shashi Romo MD, Tatyana Green MD, Leandro Yoder MD, Micaela Queen MD, Milla Saenz MD, Erick Flores MD, Samara Aguirre, DO, Lester Damon, DO, Gen Camp MD,  Laci Metz, DO, Luke Arita MD, Yung Kaye MD, Jordyn Blair MD, Suzan Camarena MD, Clay Hodge MD, Rell Barber MD, Alf Mathew MD, Edenilson Guerrier, [facility], [facility], [facility], Jerry Moreno, [facility], Chan Chairez, CNS, Georgina Jackson, CNP, Cira Haley, CNP, Nia Garcia, CNP, Hernán Ramsey, CNP, Jessica Womack, CNP, Kourtney Ponce PA-C, Destini Siddiqi, [facility], Tyler Malhotra, CNP, Larry Bowling, CNP, Arianna Wilkins, CNP, Breana Gutierrez, CNP, Rosa Sanchez, CNP, Joselyn Bailey, [facility], [facility]      Daily Progress Note     Admit Date: 11/15/2021  Bed/Room No.  2009/2009-02  Admitting Physician : Tatyana Green MD  Code Status :Full Code  Hospital Day:  LOS: 2 days   Chief Complaint:     Chief Complaint   Patient presents with    Emesis     this morning, coffee ground     Principal Problem:    Gastrointestinal hemorrhage with hematemesis  Active Problems:    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 (NSAID)    Post herpetic neuralgia    Gastrointestinal hemorrhage    Blood loss anemia  Resolved Problems:    * No resolved hospital problems. *    Subjective : Interval History/Significant events :  11/17/21    Patient is eating and tolerating food. She denies any blood in stool and did not had any BM since admission. She is breathing on room air. She has no complaints to me. She is alert and oriented. Vitals - Stable afebrile, Low BP   Labs -hemoglobin 7.1, creatinine 0.79. Nursing notes , Consults notes reviewed. Overnight events and updates discussed with Nursing staff .    Background History:         Aryan Ramirez is 67 y.o. female  Who was admitted to the hospital on 11/15/2021 for treatment of Gastrointestinal hemorrhage with hematemesis. Patient presented to emergency room with coffee ground emesis associated with nausea, lightheadedness. Patient also reported loose stool, abdominal cramps, lightheadedness and passed out in the bathroom. Patient has been taking diclofenac 50 mg 3 times daily and tramadol 50 mg twice daily for arthritis. Work-up in ED showed tachycardia 90s to 100, SBP 90s, hemoglobin 9.1. CT abdomen pelvis showed asymmetric nodularity mucosal hyperenhancement along the posterior wall of pylorus concerning for peptic ulcer disease with 8 mm left renal cyst.     PMH:  Past Medical History:   Diagnosis Date    Acid reflux     Shingles       Allergies: No Known Allergies   Medications :  pantoprazole, 40 mg, Oral, BID AC  gabapentin, 800 mg, Oral, TID  sertraline, 25 mg, Oral, Daily  tiZANidine, 4 mg, Oral, Nightly  traZODone, 50 mg, Oral, Nightly  chlorhexidine, 15 mL, Mouth/Throat, BID  sodium chloride flush, 5-40 mL, IntraVENous, 2 times per day  influenza virus vaccine, 0.5 mL, IntraMUSCular, Prior to discharge        Review of Systems   Review of Systems   Constitutional: Negative for appetite change, fatigue, fever and unexpected weight change. HENT: Negative for congestion, rhinorrhea and sneezing. Eyes: Negative for visual disturbance. Respiratory: Negative for cough, chest tightness, shortness of breath and wheezing. Cardiovascular: Negative for chest pain and palpitations. Gastrointestinal: Negative for abdominal pain, blood in stool, constipation, diarrhea, nausea and vomiting. Genitourinary: Negative for dysuria, enuresis, frequency and hematuria. Musculoskeletal: Negative for arthralgias and myalgias. Skin: Negative for rash. Neurological: Negative for dizziness, weakness, light-headedness and headaches. Hematological: Does not bruise/bleed easily.    Psychiatric/Behavioral: Pulses:           Dorsalis pedis pulses are 2+ on the right side and 2+ on the left side. Heart sounds: Normal heart sounds. No murmur heard. Pulmonary:      Effort: Pulmonary effort is normal.      Breath sounds: Normal breath sounds. No wheezing or rales. Abdominal:      Palpations: Abdomen is soft. There is no mass. Tenderness: There is no abdominal tenderness. Musculoskeletal:      Cervical back: Full passive range of motion without pain and neck supple. Lymphadenopathy:      Head:      Right side of head: No submandibular adenopathy. Left side of head: No submandibular adenopathy. Cervical: No cervical adenopathy. Skin:     General: Skin is warm. Neurological:      Mental Status: She is alert and oriented to person, place, and time. Motor: No tremor. Psychiatric:         Behavior: Behavior is cooperative. Laboratory findings:    Recent Labs     11/15/21  0858 11/15/21  2010 11/16/21  1840 11/17/21  0055 11/17/21  0643   WBC 6.7  --   --   --   --    HGB 9.1*   < > 7.8* 7.0* 7.1*   HCT 29.2*   < > 24.4* 21.4* 22.3*     --   --   --   --    INR 1.1  --   --   --   --     < > = values in this interval not displayed.      Recent Labs     11/15/21  0858 11/16/21  0740 11/17/21  0643    143 141   K 4.6 4.2 3.7    112* 110*   CO2 25 21 21   GLUCOSE 99 91 85   BUN 65* 45* 24*   CREATININE 0.77 0.79 0.72   CALCIUM 9.7 8.0* 7.8*     Recent Labs     11/15/21  0858   PROT 6.2*   LABALBU 3.9   AST 20   ALT 16   ALKPHOS 51   BILITOT 0.27*   LIPASE 31          Specific Gravity, UA   Date Value Ref Range Status   11/15/2021 1.010 1.005 - 1.030 Final     Protein, UA   Date Value Ref Range Status   11/15/2021 NEGATIVE NEGATIVE Final     RBC, UA   Date Value Ref Range Status   11/15/2021 2 TO 5 0 - 2 /HPF Final     Blood, UA POC   Date Value Ref Range Status   10/13/2018 negative  Final     Bacteria, UA   Date Value Ref Range Status   11/15/2021 NOT REPORTED None Final     Nitrite, Urine   Date Value Ref Range Status   11/15/2021 NEGATIVE NEGATIVE Final     WBC, UA   Date Value Ref Range Status   11/15/2021 0 TO 2 0 - 5 /HPF Final     Leukocyte Esterase, Urine   Date Value Ref Range Status   11/15/2021 NEGATIVE NEGATIVE Final       Imaging / Clinical Data :-   CT ABDOMEN PELVIS W IV CONTRAST Additional Contrast? None    Result Date: 11/15/2021  1. There focal asymmetric a nodularity mucosal hyperenhancement along posterior wall the pylorus raising concern for peptic ulcer disease or other pathology. Endoscopic correlation advised 2. 8 mm left renal cysts and additional tiny hypodensities too small to characterize. Clinical Course : stable  Assessment and Plan  :        Upper GI bleed due to NSAID induced peptic ulcer  -Protonix twice daily. Avoid NSAIDs. Follow-up outpatient with GI for biopsy results. Anemia of acute blood loss - s/p 1 unit transfusion . Patient is having hypotension. Continue IV fluids. Start regular diet. Postherpetic neuralgia-tramadol, gabapentin 80 mg 3 times daily. Discharge home     Plan and updates discussed with patient ,  answers  explained to satisfaction.    Plan discussed with Staff Saad Glez RN     (Please note that portions of this note were completed with a voice recognition program. Efforts were made to edit the dictations but occasionally words are mis-transcribed.)      Soto Gaona MD  11/17/2021

## 2021-11-17 NOTE — CARE COORDINATION
Social work: [facility] is not taking any new patients tonight. Await [facility] decision. Sherine chang    Social work: [facility] declined the referral.  Advised Rn pt has no one to accept the home care referral tonight. Will start trying again in the morning to see if there are any agencies willing to take the insurance that have room for a new patient.   Sherine chang

## 2021-11-17 NOTE — PROGRESS NOTES
CLINICAL PHARMACY NOTE: MEDS TO BEDS    Total # of Prescriptions Filled: 1   The following medications were delivered to the patient:  · PANTOPRAZOLE 40MG    Additional Documentation:  PT BOUGHT OTC BOTTLE OF IRON 325 MG

## 2021-11-18 VITALS
BODY MASS INDEX: 22.26 KG/M2 | HEART RATE: 70 BPM | RESPIRATION RATE: 16 BRPM | SYSTOLIC BLOOD PRESSURE: 121 MMHG | HEIGHT: 62 IN | WEIGHT: 121 LBS | TEMPERATURE: 98.1 F | OXYGEN SATURATION: 95 % | DIASTOLIC BLOOD PRESSURE: 55 MMHG

## 2021-11-18 LAB
ANION GAP SERPL CALCULATED.3IONS-SCNC: 10 MMOL/L (ref 9–17)
BUN BLDV-MCNC: 16 MG/DL (ref 8–23)
BUN/CREAT BLD: 20 (ref 9–20)
CALCIUM SERPL-MCNC: 8.3 MG/DL (ref 8.6–10.4)
CHLORIDE BLD-SCNC: 107 MMOL/L (ref 98–107)
CO2: 23 MMOL/L (ref 20–31)
CREAT SERPL-MCNC: 0.81 MG/DL (ref 0.5–0.9)
GFR AFRICAN AMERICAN: >60 ML/MIN
GFR NON-AFRICAN AMERICAN: >60 ML/MIN
GFR SERPL CREATININE-BSD FRML MDRD: ABNORMAL ML/MIN/{1.73_M2}
GFR SERPL CREATININE-BSD FRML MDRD: ABNORMAL ML/MIN/{1.73_M2}
GLUCOSE BLD-MCNC: 103 MG/DL (ref 70–99)
HCT VFR BLD CALC: 20.8 % (ref 36.3–47.1)
HCT VFR BLD CALC: 27.5 % (ref 36.3–47.1)
HCT VFR BLD CALC: 31 % (ref 36.3–47.1)
HEMOGLOBIN: 10 G/DL (ref 11.9–15.1)
HEMOGLOBIN: 6.7 G/DL (ref 11.9–15.1)
HEMOGLOBIN: 8.8 G/DL (ref 11.9–15.1)
POTASSIUM SERPL-SCNC: 4 MMOL/L (ref 3.7–5.3)
SODIUM BLD-SCNC: 140 MMOL/L (ref 135–144)

## 2021-11-18 PROCEDURE — 36415 COLL VENOUS BLD VENIPUNCTURE: CPT

## 2021-11-18 PROCEDURE — 80048 BASIC METABOLIC PNL TOTAL CA: CPT

## 2021-11-18 PROCEDURE — 86900 BLOOD TYPING SEROLOGIC ABO: CPT

## 2021-11-18 PROCEDURE — 2580000003 HC RX 258: Performed by: INTERNAL MEDICINE

## 2021-11-18 PROCEDURE — P9016 RBC LEUKOCYTES REDUCED: HCPCS

## 2021-11-18 PROCEDURE — 85014 HEMATOCRIT: CPT

## 2021-11-18 PROCEDURE — 6370000000 HC RX 637 (ALT 250 FOR IP): Performed by: INTERNAL MEDICINE

## 2021-11-18 PROCEDURE — 6370000000 HC RX 637 (ALT 250 FOR IP): Performed by: FAMILY MEDICINE

## 2021-11-18 PROCEDURE — 85018 HEMOGLOBIN: CPT

## 2021-11-18 PROCEDURE — 99239 HOSP IP/OBS DSCHRG MGMT >30: CPT | Performed by: FAMILY MEDICINE

## 2021-11-18 RX ORDER — LANOLIN ALCOHOL/MO/W.PET/CERES
325 CREAM (GRAM) TOPICAL 2 TIMES DAILY WITH MEALS
Status: DISCONTINUED | OUTPATIENT
Start: 2021-11-18 | End: 2021-11-18 | Stop reason: HOSPADM

## 2021-11-18 RX ORDER — SODIUM CHLORIDE 9 MG/ML
INJECTION, SOLUTION INTRAVENOUS PRN
Status: DISCONTINUED | OUTPATIENT
Start: 2021-11-18 | End: 2021-11-18 | Stop reason: HOSPADM

## 2021-11-18 RX ORDER — MIDODRINE HYDROCHLORIDE 2.5 MG/1
2.5 TABLET ORAL
Status: DISCONTINUED | OUTPATIENT
Start: 2021-11-18 | End: 2021-11-18 | Stop reason: HOSPADM

## 2021-11-18 RX ORDER — GABAPENTIN 300 MG/1
300 CAPSULE ORAL 3 TIMES DAILY
Status: DISCONTINUED | OUTPATIENT
Start: 2021-11-18 | End: 2021-11-18 | Stop reason: HOSPADM

## 2021-11-18 RX ADMIN — SODIUM CHLORIDE, PRESERVATIVE FREE 10 ML: 5 INJECTION INTRAVENOUS at 09:32

## 2021-11-18 RX ADMIN — SERTRALINE 25 MG: 25 TABLET, FILM COATED ORAL at 09:34

## 2021-11-18 RX ADMIN — MIDODRINE HYDROCHLORIDE 2.5 MG: 2.5 TABLET ORAL at 09:34

## 2021-11-18 RX ADMIN — ACETAMINOPHEN 650 MG: 325 TABLET ORAL at 01:27

## 2021-11-18 RX ADMIN — GABAPENTIN 300 MG: 300 CAPSULE ORAL at 09:35

## 2021-11-18 RX ADMIN — PANTOPRAZOLE SODIUM 40 MG: 40 TABLET, DELAYED RELEASE ORAL at 05:05

## 2021-11-18 RX ADMIN — CHLORHEXIDINE GLUCONATE 15 ML: 1.2 RINSE ORAL at 09:32

## 2021-11-18 RX ADMIN — FERROUS SULFATE TAB EC 325 MG (65 MG FE EQUIVALENT) 325 MG: 325 (65 FE) TABLET DELAYED RESPONSE at 09:34

## 2021-11-18 ASSESSMENT — ENCOUNTER SYMPTOMS
NAUSEA: 0
CONSTIPATION: 0
DIARRHEA: 0
ABDOMINAL PAIN: 0
RHINORRHEA: 0
WHEEZING: 0
COUGH: 0
SHORTNESS OF BREATH: 0
BLOOD IN STOOL: 0
VOMITING: 0
CHEST TIGHTNESS: 0

## 2021-11-18 ASSESSMENT — PAIN SCALES - GENERAL: PAINLEVEL_OUTOF10: 2

## 2021-11-18 NOTE — PLAN OF CARE
Problem: Falls - Risk of:  Goal: Will remain free from falls  Description: Will remain free from falls  11/16/2021 1151 by Randi Avila RN  Outcome: Ongoing  11/15/2021 2222 by Garret Boone RN  Outcome: Ongoing  Goal: Absence of physical injury  Description: Absence of physical injury  11/16/2021 1151 by Randi Avila RN  Outcome: Ongoing  11/15/2021 2222 by Garret Boone RN  Outcome: Ongoing     Problem: SAFETY  Goal: Free from accidental physical injury  11/16/2021 1151 by Randi Avila RN  Outcome: Ongoing  11/15/2021 2222 by Garret Boone RN  Outcome: Ongoing  Goal: Free from intentional harm  11/16/2021 1151 by Randi Avila RN  Outcome: Ongoing  11/15/2021 2222 by Garret Boone RN  Outcome: Ongoing     Problem: DAILY CARE  Goal: Daily care needs are met  11/16/2021 1151 by Randi Avila RN  Outcome: Ongoing  11/15/2021 2222 by Garret Boone RN  Outcome: Ongoing     Problem: SKIN INTEGRITY  Goal: Skin integrity is maintained or improved  11/16/2021 1151 by Randi Avila RN  Outcome: Ongoing  11/15/2021 2222 by Garret Boone RN  Outcome: Ongoing     Problem: KNOWLEDGE DEFICIT  Goal: Patient/S.O. demonstrates understanding of disease process, treatment plan, medications, and discharge instructions. 11/16/2021 1151 by Randi Avila RN  Outcome: Ongoing  11/15/2021 2222 by Garret Boone RN  Outcome: Ongoing     Problem: DISCHARGE BARRIERS  Goal: Patient's continuum of care needs are met  11/16/2021 1151 by Randi Avila RN  Outcome: Ongoing  11/15/2021 2222 by Garret Boone RN  Outcome: Ongoing   Pt medicated with pain medication prn. Assessed all pain characteristics including level, type, location, frequency, and onset. Non-pharmacologic interventions offered to pt as well. Pt states pain is tolerable at this time.  Will continue to monitor
Problem: Falls - Risk of:  Goal: Will remain free from falls  Description: Will remain free from falls  11/18/2021 0012 by Parker Blank RN  Outcome: Ongoing     Problem: Falls - Risk of:  Goal: Absence of physical injury  Description: Absence of physical injury  11/18/2021 0012 by Parker Blank RN  Outcome: Ongoing     Problem: SAFETY  Goal: Free from accidental physical injury  Outcome: Ongoing     Problem: SAFETY  Goal: Free from intentional harm  Outcome: Ongoing     Problem: DAILY CARE  Goal: Daily care needs are met  Outcome: Ongoing     Problem: SKIN INTEGRITY  Goal: Skin integrity is maintained or improved  Outcome: Ongoing
Problem: Falls - Risk of:  Goal: Will remain free from falls  Description: Will remain free from falls  Outcome: Ongoing  Goal: Absence of physical injury  Description: Absence of physical injury  Outcome: Ongoing     Problem: KNOWLEDGE DEFICIT  Goal: Patient/S.O. demonstrates understanding of disease process, treatment plan, medications, and discharge instructions.   Outcome: Ongoing
Problem: Falls - Risk of:  Goal: Will remain free from falls  Description: Will remain free from falls  Outcome: Ongoing  Goal: Absence of physical injury  Description: Absence of physical injury  Outcome: Ongoing     Problem: SAFETY  Goal: Free from accidental physical injury  Outcome: Ongoing  Goal: Free from intentional harm  Outcome: Ongoing     Problem: DAILY CARE  Goal: Daily care needs are met  Outcome: Ongoing     Problem: SKIN INTEGRITY  Goal: Skin integrity is maintained or improved  Outcome: Ongoing     Problem: KNOWLEDGE DEFICIT  Goal: Patient/S.O. demonstrates understanding of disease process, treatment plan, medications, and discharge instructions.   Outcome: Ongoing     Problem: DISCHARGE BARRIERS  Goal: Patient's continuum of care needs are met  Outcome: Ongoing
Problem: Falls - Risk of:  Goal: Will remain free from falls  Description: Will remain free from falls  Outcome: Ongoing  Goal: Absence of physical injury  Description: Absence of physical injury  Outcome: Ongoing     Problem: SAFETY  Goal: Free from accidental physical injury  Outcome: Ongoing  Goal: Free from intentional harm  Outcome: Ongoing     Problem: DAILY CARE  Goal: Daily care needs are met  Outcome: Ongoing     Problem: SKIN INTEGRITY  Goal: Skin integrity is maintained or improved  Outcome: Ongoing     Problem: KNOWLEDGE DEFICIT  Goal: Patient/S.O. demonstrates understanding of disease process, treatment plan, medications, and discharge instructions.   Outcome: Ongoing     Problem: DISCHARGE BARRIERS  Goal: Patient's continuum of care needs are met  Outcome: Ongoing
Problem: Fluid Volume:  Goal: Signs and symptoms of dehydration will decrease  Description: Signs and symptoms of dehydration will decrease  Outcome: Ongoing  Goal: Ability to achieve a balanced intake and output will improve  Description: Ability to achieve a balanced intake and output will improve  Outcome: Ongoing  Goal: Diagnostic test results will improve  Description: Diagnostic test results will improve  Outcome: Ongoing     Problem: Physical Regulation:  Goal: Complications related to the disease process, condition or treatment will be avoided or minimized  Description: Complications related to the disease process, condition or treatment will be avoided or minimized  Outcome: Ongoing  Goal: Ability to maintain vital signs within normal range will improve  Description: Ability to maintain vital signs within normal range will improve  Outcome: Ongoing

## 2021-11-18 NOTE — PROGRESS NOTES
Discharged home at this time with her adult daughter. Discharge instructions reviewed with the patient's daughter.

## 2021-11-18 NOTE — PROGRESS NOTES
reviewed. Overnight events and updates discussed with Nursing staff . Background History:         Makenna Luz is 67 y.o. female  Who was admitted to the hospital on 11/15/2021 for treatment of Gastrointestinal hemorrhage with hematemesis. Patient presented to emergency room with coffee ground emesis associated with nausea, lightheadedness. Patient also reported loose stool, abdominal cramps, lightheadedness and passed out in the bathroom. Patient has been taking diclofenac 50 mg 3 times daily and tramadol 50 mg twice daily for arthritis. Work-up in ED showed tachycardia 90s to 100, SBP 90s, hemoglobin 9.1. CT abdomen pelvis showed asymmetric nodularity mucosal hyperenhancement along the posterior wall of pylorus concerning for peptic ulcer disease with 8 mm left renal cyst.   Patient received red blood cell transfusion for anemia of acute blood loss. EGD showed duodenal ulcer likely secondary to NSAIDs. PMH:  Past Medical History:   Diagnosis Date    Acid reflux     Shingles       Allergies: No Known Allergies   Medications :  gabapentin, 300 mg, Oral, TID  midodrine, 2.5 mg, Oral, TID WC  ferrous sulfate, 325 mg, Oral, BID WC  pantoprazole, 40 mg, Oral, BID AC  sertraline, 25 mg, Oral, Daily  tiZANidine, 4 mg, Oral, Nightly  chlorhexidine, 15 mL, Mouth/Throat, BID  sodium chloride flush, 5-40 mL, IntraVENous, 2 times per day  influenza virus vaccine, 0.5 mL, IntraMUSCular, Prior to discharge        Review of Systems   Review of Systems   Constitutional: Negative for appetite change, fatigue, fever and unexpected weight change. HENT: Negative for congestion, rhinorrhea and sneezing. Eyes: Negative for visual disturbance. Respiratory: Negative for cough, chest tightness, shortness of breath and wheezing. Cardiovascular: Negative for chest pain and palpitations. Gastrointestinal: Negative for abdominal pain, blood in stool, constipation, diarrhea, nausea and vomiting.    Genitourinary: Negative for dysuria, enuresis, frequency and hematuria. Musculoskeletal: Negative for arthralgias and myalgias. Skin: Negative for rash. Neurological: Negative for dizziness, weakness, light-headedness and headaches. Hematological: Does not bruise/bleed easily. Psychiatric/Behavioral: Negative for dysphoric mood and sleep disturbance. Objective :      Current Vitals : Temp: 98.5 °F (36.9 °C),  Pulse: 63, Resp: 16, BP: (!) 91/45, SpO2: 95 %  Last 24 Hrs Vitals   Patient Vitals for the past 24 hrs:   BP Temp Temp src Pulse Resp SpO2 Weight   11/18/21 0650 -- -- -- -- -- -- 121 lb (54.9 kg)   11/18/21 0500 (!) 91/45 98.5 °F (36.9 °C) Oral 63 16 95 % --   11/18/21 0404 91/68 98.5 °F (36.9 °C) Oral 57 16 95 % --   11/18/21 0151 (!) 82/40 98.4 °F (36.9 °C) Oral 70 16 97 % --   11/18/21 0127 (!) 82/43 98.7 °F (37.1 °C) Oral 74 16 95 % --   11/18/21 0050 (!) 84/42 99.4 °F (37.4 °C) Oral 72 16 94 % --   11/17/21 2345 (!) 87/40 99.3 °F (37.4 °C) Oral 82 16 92 % --   11/17/21 1940 (!) 121/49 99.7 °F (37.6 °C) Oral 79 16 97 % --   11/17/21 1215 (!) 106/45 98.6 °F (37 °C) Oral 79 16 98 % --     Intake / output   11/17 0701 - 11/18 0700  In: 12 [P.O.:120]  Out: 1900 [Urine:1900]  Physical Exam:  Physical Exam  Vitals and nursing note reviewed. Constitutional:       General: She is not in acute distress. Appearance: She is not diaphoretic. HENT:      Head: Normocephalic and atraumatic. Nose:      Right Sinus: No maxillary sinus tenderness or frontal sinus tenderness. Left Sinus: No maxillary sinus tenderness or frontal sinus tenderness. Mouth/Throat:      Pharynx: No oropharyngeal exudate. Eyes:      General: No scleral icterus. Conjunctiva/sclera: Conjunctivae normal.      Pupils: Pupils are equal, round, and reactive to light. Neck:      Thyroid: No thyromegaly. Vascular: No JVD. Cardiovascular:      Rate and Rhythm: Normal rate and regular rhythm.       Pulses: Dorsalis pedis pulses are 2+ on the right side and 2+ on the left side. Heart sounds: Normal heart sounds. No murmur heard. Pulmonary:      Effort: Pulmonary effort is normal.      Breath sounds: Normal breath sounds. No wheezing or rales. Abdominal:      Palpations: Abdomen is soft. There is no mass. Tenderness: There is no abdominal tenderness. Musculoskeletal:      Cervical back: Full passive range of motion without pain and neck supple. Lymphadenopathy:      Head:      Right side of head: No submandibular adenopathy. Left side of head: No submandibular adenopathy. Cervical: No cervical adenopathy. Skin:     General: Skin is warm. Neurological:      Mental Status: She is alert and oriented to person, place, and time. Motor: No tremor. Psychiatric:         Behavior: Behavior is cooperative. Laboratory findings:    Recent Labs     11/15/21  0858 11/15/21  2010 11/17/21  1830 11/18/21  0016 11/18/21  0720   WBC 6.7  --   --   --   --    HGB 9.1*   < > 9.3* 6.7* 8.8*   HCT 29.2*   < > 30.1* 20.8* 27.5*     --   --   --   --    INR 1.1  --   --   --   --     < > = values in this interval not displayed.      Recent Labs     11/15/21  0858 11/16/21  0740 11/17/21  0643    143 141   K 4.6 4.2 3.7    112* 110*   CO2 25 21 21   GLUCOSE 99 91 85   BUN 65* 45* 24*   CREATININE 0.77 0.79 0.72   CALCIUM 9.7 8.0* 7.8*     Recent Labs     11/15/21  0858   PROT 6.2*   LABALBU 3.9   AST 20   ALT 16   ALKPHOS 51   BILITOT 0.27*   LIPASE 31          Specific Gravity, UA   Date Value Ref Range Status   11/15/2021 1.010 1.005 - 1.030 Final     Protein, UA   Date Value Ref Range Status   11/15/2021 NEGATIVE NEGATIVE Final     RBC, UA   Date Value Ref Range Status   11/15/2021 2 TO 5 0 - 2 /HPF Final     Blood, UA POC   Date Value Ref Range Status   10/13/2018 negative  Final     Bacteria, UA   Date Value Ref Range Status   11/15/2021 NOT REPORTED None Final Nitrite, Urine   Date Value Ref Range Status   11/15/2021 NEGATIVE NEGATIVE Final     WBC, UA   Date Value Ref Range Status   11/15/2021 0 TO 2 0 - 5 /HPF Final     Leukocyte Esterase, Urine   Date Value Ref Range Status   11/15/2021 NEGATIVE NEGATIVE Final       Imaging / Clinical Data :-   CT ABDOMEN PELVIS W IV CONTRAST Additional Contrast? None    Result Date: 11/15/2021  1. There focal asymmetric a nodularity mucosal hyperenhancement along posterior wall the pylorus raising concern for peptic ulcer disease or other pathology. Endoscopic correlation advised 2. 8 mm left renal cysts and additional tiny hypodensities too small to characterize. Clinical Course : stable  Assessment and Plan  :        Upper GI bleed due to NSAID induced peptic ulcer  -Protonix twice daily. Avoid NSAIDs. Follow-up outpatient with GI for biopsy results. Anemia of acute blood loss - s/p 2 unit transfusion. Iron supplement. Postherpetic neuralgia-tramadol, gabapentin 80 mg 3 times daily. Discharge home with home care    Plan and updates discussed with patient ,  answers  explained to satisfaction.    Plan discussed with Staff WING RN     (Please note that portions of this note were completed with a voice recognition program. Efforts were made to edit the dictations but occasionally words are mis-transcribed.)      Tim Casey MD  11/18/2021

## 2021-11-18 NOTE — CARE COORDINATION
Discharge Planning    STEW faxed to [address] at fax [phone] with follow up phone care to office to notify them of pt's discharge today.

## 2021-11-18 NOTE — PROGRESS NOTES
BP 82/40(51) HR 70 Temp 98.4 O2 97% RR 16  15 minute VS as above  Writer remained with patient during first 15 minutes of infusion  No reaction noted by writer or voiced by patient  Will continue VS per blood protocol

## 2021-11-18 NOTE — PROGRESS NOTES
BP 91/45(55) HR 63 Temp 98.5 O2 95% RR 16  1 hour post infusion VS as above  Pt remains stable  No reaction noted by writer or voiced by patient  Will continue VS per blood protocol

## 2021-11-19 LAB
ABO/RH: NORMAL
ANTIBODY SCREEN: NEGATIVE
ARM BAND NUMBER: NORMAL
BLD PROD TYP BPU: NORMAL
BLD PROD TYP BPU: NORMAL
CROSSMATCH RESULT: NORMAL
CROSSMATCH RESULT: NORMAL
DISPENSE STATUS BLOOD BANK: NORMAL
DISPENSE STATUS BLOOD BANK: NORMAL
EXPIRATION DATE: NORMAL
TRANSFUSION STATUS: NORMAL
TRANSFUSION STATUS: NORMAL
UNIT DIVISION: 0
UNIT DIVISION: 0
UNIT NUMBER: NORMAL
UNIT NUMBER: NORMAL

## 2021-11-22 ENCOUNTER — TELEPHONE (OUTPATIENT)
Dept: FAMILY MEDICINE CLINIC | Age: 76
End: 2021-11-22

## 2021-11-22 NOTE — TELEPHONE ENCOUNTER
[facility] Transitions Initial Follow Up Call    Outreach made within 2 business days of discharge: Yes    Patient: Payam Ellington Patient : 1945   MRN: [id number]  Reason for Admission: There are no discharge diagnoses documented for the most recent discharge. Discharge Date: 21       Spoke with: Maryjane Montiel    Discharge department/facility: [facility] Patient Contact:  Was patient able to fill all prescriptions: Yes  Was patient instructed to bring all medications to the follow-up visit: Yes  Is patient taking all medications as directed in the discharge summary?  Yes  Does patient understand their discharge instructions: Yes  Does patient have questions or concerns that need addressed prior to 7-14 day follow up office visit: no    Scheduled appointment with PCP within 7-14 days    Scheduled pt follow up 21 10a    Follow Up  Future Appointments   Date Time Provider Dusty Belle   2021 11:45 AM SOFIYA BLANCO [facility] RM [facility]       [place], [place]

## 2021-12-02 ENCOUNTER — OFFICE VISIT (OUTPATIENT)
Dept: FAMILY MEDICINE CLINIC | Age: 76
End: 2021-12-02
Payer: MEDICARE

## 2021-12-02 VITALS
WEIGHT: 120.4 LBS | TEMPERATURE: 97.9 F | OXYGEN SATURATION: 96 % | SYSTOLIC BLOOD PRESSURE: 117 MMHG | BODY MASS INDEX: 22.02 KG/M2 | DIASTOLIC BLOOD PRESSURE: 67 MMHG | HEART RATE: 87 BPM

## 2021-12-02 DIAGNOSIS — K22.70 BARRETT'S ESOPHAGUS DETERMINED BY BIOPSY: Primary | ICD-10-CM

## 2021-12-02 DIAGNOSIS — Z23 NEED FOR INFLUENZA VACCINATION: ICD-10-CM

## 2021-12-02 PROCEDURE — G0008 ADMIN INFLUENZA VIRUS VAC: HCPCS | Performed by: FAMILY MEDICINE

## 2021-12-02 PROCEDURE — 90694 VACC AIIV4 NO PRSRV 0.5ML IM: CPT | Performed by: FAMILY MEDICINE

## 2021-12-02 PROCEDURE — 99495 TRANSJ CARE MGMT MOD F2F 14D: CPT | Performed by: FAMILY MEDICINE

## 2021-12-02 PROCEDURE — 1111F DSCHRG MED/CURRENT MED MERGE: CPT | Performed by: FAMILY MEDICINE

## 2021-12-02 RX ORDER — TIZANIDINE 2 MG/1
TABLET ORAL
Qty: 90 TABLET | Refills: 0 | Status: SHIPPED | OUTPATIENT
Start: 2021-12-02 | End: 2022-01-31

## 2021-12-02 SDOH — ECONOMIC STABILITY: FOOD INSECURITY: WITHIN THE PAST 12 MONTHS, THE FOOD YOU BOUGHT JUST DIDN'T LAST AND YOU DIDN'T HAVE MONEY TO GET MORE.: NEVER TRUE

## 2021-12-02 SDOH — ECONOMIC STABILITY: FOOD INSECURITY: WITHIN THE PAST 12 MONTHS, YOU WORRIED THAT YOUR FOOD WOULD RUN OUT BEFORE YOU GOT MONEY TO BUY MORE.: NEVER TRUE

## 2021-12-02 ASSESSMENT — SOCIAL DETERMINANTS OF HEALTH (SDOH): HOW HARD IS IT FOR YOU TO PAY FOR THE VERY BASICS LIKE FOOD, HOUSING, MEDICAL CARE, AND HEATING?: NOT HARD AT ALL

## 2021-12-02 NOTE — PATIENT INSTRUCTIONS
attack or stroke. But taking aspirin isn't right for everyone, because it can cause serious bleeding. Talk to your doctor before you start taking aspirin every day. You and your doctor can decide if aspirin is a good choice for you based on your risk of a heart attack or stroke and your risk of serious bleeding. Unless you have a high risk of a heart attack or stroke, the benefits of aspirin probably won't outweigh the risk of bleeding. · If you use other NSAIDs a lot, aspirin may not work as well to prevent heart attack and stroke. · If you take aspirin every day for your heart, talk with your doctor before you take other NSAIDs. · Do not give aspirin to anyone younger than 20. It has been linked to Reye syndrome, a serious illness. When should you call for help? Call 911 anytime you think you may need emergency care. For example, call if:    · You passed out (lost consciousness).     · You vomit blood or what looks like coffee grounds.     · You pass maroon or very bloody stools. Call your doctor now or seek immediate medical care if:    · Your stools are black and tarlike or have streaks of blood. Watch closely for changes in your health, and be sure to contact your doctor if you have any problems. Where can you learn more? Go to [email or web address]. org and sign in to your [employer] account. Enter A328 in the [facility] box to learn more about \"Nonsteroidal Anti-Inflammatory Drugs (NSAIDs): Care Instructions. \"     If you do not have an account, please click on the \"Sign Up Now\" link. Current as of: April 8, 2021               Content Version: 13.0  © 3839-8026 [employer], Inc[employer]. Care instructions adapted under license by [facility] ([facility]). If you have questions about a medical condition or this instruction, always ask your healthcare professional. [address] any warranty or liability for your use of this information.

## 2021-12-02 NOTE — TELEPHONE ENCOUNTER
Eliana Patterson is calling to request a refill on the following medication(s):    Last Visit Date (If Applicable):  08/6/7456    Next Visit Date:    3/3/2022    Medication Request:  Requested Prescriptions     Pending Prescriptions Disp Refills    tiZANidine (ZANAFLEX) 2 MG tablet [Pharmacy Med Name: tiZANidine HCL 2MG TABLET] 90 tablet 0     Sig: TAKE ONE TABLET BY MOUTH EVERY 8 HOURS AS NEEDED

## 2021-12-02 NOTE — PROGRESS NOTES
Post-Discharge Transitional Care Management Services or Hospital Follow Up      Torie Bear   YOB: 1945    Date of Office Visit:  12/2/2021  Date of Hospital Admission: 11/15/21  Date of Hospital Discharge: 11/18/21  Risk of hospital readmission (high >=14%.  Medium >=10%) :Readmission Risk Score: 10.1 ( )      Care management risk score Rising risk (score 2-5) and Complex Care (Scores >=6): 0     Non face to face  following discharge, date last encounter closed (first attempt may have been earlier): 11/22/2021  2:31 PM    Call initiated 2 business days of discharge: Yes    Patient Active Problem List   Diagnosis    Colitis    Proctitis    Diarrhea of presumed infectious origin    Left ovarian cyst    Recurrent colitis due to Clostridium difficile    Enterocolitis    Primary insomnia    Asymptomatic menopausal state     Gastrointestinal hemorrhage with hematemesis    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 (NSAID)    Post herpetic neuralgia    Gastrointestinal hemorrhage    Blood loss anemia       No Known Allergies    Medications listed as ordered at the time of discharge from hospital  @DISCHARGEMEDSLIST(<NOROUTINE> error)@      Medications marked \"taking\" at this time  Outpatient Medications Marked as Taking for the 12/2/21 encounter (Office Visit) with Alcira Grullon MD   Medication Sig Dispense Refill    pantoprazole (PROTONIX) 40 MG tablet Take 1 tablet by mouth 2 times daily (before meals) 30 tablet 3    ferrous sulfate (IRON 325) 325 (65 Fe) MG tablet Take 1 tablet by mouth 2 times daily 180 tablet 1    traMADol (ULTRAM) 50 MG tablet TAKE ONE TABLET BY MOUTH TWICE A DAY 46 tablet 0    tiZANidine (ZANAFLEX) 2 MG tablet TAKE ONE TABLET BY MOUTH EVERY 8 HOURS AS NEEDED 90 tablet 0    melatonin 3 MG TABS tablet Take 1 tablet by mouth daily 30 tablet 3    sertraline (ZOLOFT) 25 MG tablet TAKE ONE TABLET BY MOUTH DAILY 30 tablet 3    calcium carbonate 600 MG TABS tablet TAKE ONE TABLET BY MOUTH TWICE A DAY WITH VITAMIN D3 500 MG. 60 tablet 5    naloxone 4 MG/0.1ML LIQD nasal spray 1 spray by Nasal route as needed for Opioid Reversal 1 each 5    naloxone 4 MG/0.1ML LIQD nasal spray 1 spray by Nasal route as needed for Opioid Reversal 1 each 5    naloxone 4 MG/0.1ML LIQD nasal spray 1 spray by Nasal route as needed for Opioid Reversal 1 each 5    CALCIUM PLUS VITAMIN D3 600-500 MG-UNIT CAPS TAKE 1 CAPSULE BY MOUTH TWICE A DAY 60 capsule 1    Lactobacillus (ACIDOPHILUS) CAPS capsule TAKE 1 CAPSULE EVERY DAY  5    zoster recombinant adjuvanted vaccine (SHINGRIX) 50 MCG/0.5ML SUSR injection Inject 0.5 mLs into the muscle See Admin Instructions 1 dose now and repeat in 2-6 months 0.5 mL 1        Medications patient taking as of now reconciled against medications ordered at time of hospital discharge: Yes    Chief Complaint   Patient presents with    [facility]     [facility]       History of Present illness - Follow up of Hospital diagnosis(es): 76-year-old female coming today after she was admitted to the hospital for 3 days November because of a gastrointestinal hemorrhage. She was found to have a changes in her stomach possible for peptic ulcer. Biopsy was done and the patient has not followed up with the gastroenterologist yet. The patient did receive 2 units of blood. Also iron supplement. Inpatient course: Discharge summary reviewed- see chart. Interval history/Current status: Patient has been feeling well. No real concerns. She asked me if she could take Tylenol for headaches. A comprehensive review of systems was negative except for what was noted in the HPI. Vitals:    12/02/21 0948   BP: 117/67   Pulse: 87   Temp: 97.9 °F (36.6 °C)   SpO2: 96%   Weight: 120 lb 6.4 oz (54.6 kg)     Body mass index is 22.02 kg/m².    Wt Readings from Last 3 Encounters:   12/02/21 120 lb 6.4 oz (54.6 kg)   11/18/21 121 lb (54.9 kg)   08/02/21 119 lb (54 kg)     BP Readings from Last 3 Encounters:   12/02/21 117/67   11/18/21 (!) 121/55   11/16/21 (!) 92/50        Physical Exam:  General Appearance: alert and oriented to person, place and time, well-developed and well-nourished, in no acute distress  Pulmonary/Chest: clear to auscultation bilaterally- no wheezes, rales or rhonchi, normal air movement, no respiratory distress  Cardiovascular: normal rate, normal S1 and S2, no gallops, intact distal pulses and no carotid bruits    Mani Kirkpatrick was seen today for follow-up from hospital.    Diagnoses and all orders for this visit:    Tapia's esophagus determined by biopsy  -     Lazaro Rose MD, Gastroenterology, [address]    Need for influenza vaccination  -     INFLUENZA, QUADV, ADJUVANTED, 72 YRS =, IM, PF, PREFILL SYR, 0.5ML (FLUAD)  -     [place] DISCHARGE MEDS RECONCILED W/ CURRENT OUTPATIENT MED LIST    Patient is doing well overall. She needs to see her gastroenterologist for further recommendation of colonoscopy. Referral provided. Patient will call with any changes. Told her to get the booster vaccine COVID-19. Call or return to clinic prn if these symptoms worsen or fail to improve as anticipated. I have reviewed the instructions with the patient, answering all questions to her satisfaction.       Medical Decision Making: moderate complexity

## 2021-12-16 ENCOUNTER — OFFICE VISIT (OUTPATIENT)
Dept: GASTROENTEROLOGY | Age: 76
End: 2021-12-16
Payer: MEDICARE

## 2021-12-16 ENCOUNTER — TELEPHONE (OUTPATIENT)
Dept: GASTROENTEROLOGY | Age: 76
End: 2021-12-16

## 2021-12-16 VITALS
BODY MASS INDEX: 39.32 KG/M2 | DIASTOLIC BLOOD PRESSURE: 64 MMHG | SYSTOLIC BLOOD PRESSURE: 123 MMHG | HEART RATE: 65 BPM | TEMPERATURE: 98.3 F | WEIGHT: 215 LBS

## 2021-12-16 DIAGNOSIS — K25.4 GASTROINTESTINAL HEMORRHAGE ASSOCIATED WITH GASTRIC ULCER: ICD-10-CM

## 2021-12-16 DIAGNOSIS — D50.0 BLOOD LOSS ANEMIA: Primary | ICD-10-CM

## 2021-12-16 DIAGNOSIS — K92.0 GASTROINTESTINAL HEMORRHAGE WITH HEMATEMESIS: ICD-10-CM

## 2021-12-16 PROCEDURE — 4040F PNEUMOC VAC/ADMIN/RCVD: CPT | Performed by: INTERNAL MEDICINE

## 2021-12-16 PROCEDURE — G8417 CALC BMI ABV UP PARAM F/U: HCPCS | Performed by: INTERNAL MEDICINE

## 2021-12-16 PROCEDURE — 1090F PRES/ABSN URINE INCON ASSESS: CPT | Performed by: INTERNAL MEDICINE

## 2021-12-16 PROCEDURE — 1111F DSCHRG MED/CURRENT MED MERGE: CPT | Performed by: INTERNAL MEDICINE

## 2021-12-16 PROCEDURE — 99214 OFFICE O/P EST MOD 30 MIN: CPT | Performed by: INTERNAL MEDICINE

## 2021-12-16 PROCEDURE — G8484 FLU IMMUNIZE NO ADMIN: HCPCS | Performed by: INTERNAL MEDICINE

## 2021-12-16 PROCEDURE — G8427 DOCREV CUR MEDS BY ELIG CLIN: HCPCS | Performed by: INTERNAL MEDICINE

## 2021-12-16 PROCEDURE — 1123F ACP DISCUSS/DSCN MKR DOCD: CPT | Performed by: INTERNAL MEDICINE

## 2021-12-16 PROCEDURE — 1036F TOBACCO NON-USER: CPT | Performed by: INTERNAL MEDICINE

## 2021-12-16 PROCEDURE — G8399 PT W/DXA RESULTS DOCUMENT: HCPCS | Performed by: INTERNAL MEDICINE

## 2021-12-16 RX ORDER — PANTOPRAZOLE SODIUM 40 MG/1
40 TABLET, DELAYED RELEASE ORAL DAILY
Qty: 90 TABLET | Refills: 3 | Status: SHIPPED | OUTPATIENT
Start: 2021-12-16

## 2021-12-16 RX ORDER — POLYETHYLENE GLYCOL 3350 17 G/17G
POWDER, FOR SOLUTION ORAL
Qty: 238 G | Refills: 0 | Status: ON HOLD | OUTPATIENT
Start: 2021-12-16 | End: 2022-02-16 | Stop reason: ALTCHOICE

## 2021-12-16 RX ORDER — BISACODYL 5 MG
TABLET, DELAYED RELEASE (ENTERIC COATED) ORAL
Qty: 2 TABLET | Refills: 0 | Status: ON HOLD | OUTPATIENT
Start: 2021-12-16 | End: 2022-02-16 | Stop reason: ALTCHOICE

## 2021-12-16 ASSESSMENT — ENCOUNTER SYMPTOMS
NAUSEA: 0
RECTAL PAIN: 0
BACK PAIN: 0
WHEEZING: 0
ABDOMINAL DISTENTION: 0
ANAL BLEEDING: 0
COLOR CHANGE: 0
ABDOMINAL PAIN: 0
TROUBLE SWALLOWING: 0
CONSTIPATION: 1
VOMITING: 0
CHOKING: 0
VOICE CHANGE: 0
SHORTNESS OF BREATH: 0
SORE THROAT: 0
BLOOD IN STOOL: 0
COUGH: 0
DIARRHEA: 0

## 2021-12-16 NOTE — PROGRESS NOTES
GI OFFICE FOLLOW UP    Savana Madrigal is a 67 y.o. female evaluated via on 12/16/2021. Consent:  She and/or health care decision maker is aware that that she may receive a bill for this telephone service, depending on her insurance coverage, and has provided verbal consent to proceed: YES      INTERVAL HISTORY:   No referring provider defined for this encounter. Chief Complaint   Patient presents with    New Patient     Patient here to follow up from hospital / patient states she is feeling much better since hospital / Patient states she is not bleeding but sometimes bloated and gasy       1. Blood loss anemia    2. Gastrointestinal hemorrhage associated with gastric ulcer    3.  Gastrointestinal hemorrhage with hematemesis      This patient seen my office after her recent hospitalization at [facility] where she was admitted with GI bleeding/hematemesis she underwent an upper endoscopy by myself was found to have 4-5 ulcerations in the antral area probably consistent with NSAIDs patient has done remarkably well after that patient has been taking iron pills last hemoglobin when she was discharged returned no recent hemoglobin levels are available she denies any melanotic stool fresh rectal bleeding  It appears that patient never had a colonoscopy done and is willing to have it done now  Complains of mild abdominal discomfort no nausea vomiting hematemesis  No history for smoking alcohol abuse illicit drug usage  Previous records were reviewed with her      HISTORY OF PRESENT ILLNESS: Scarlet Sandhoff is a 67 y.o. female with a past history remarkable for , referred for evaluation of   Chief Complaint   Patient presents with    New Patient     Patient here to follow up from hospital / patient states she is feeling much better since hospital / Patient states she is not bleeding but sometimes bloated and gasy   . Past Medical,Family, and Social History reviewed and does contribute to the patient presenting condition. Patient's PMH/PSH,SH,PSYCH Hx, MEDs, ALLERGIES, and ROS were all reviewed and updated in the appropriate sections. PAST MEDICAL HISTORY:  Past Medical History:   Diagnosis Date    Acid reflux     Shingles        Past Surgical History:   Procedure Laterality Date    APPENDECTOMY      CHOLECYSTECTOMY      HYSTERECTOMY      UPPER GASTROINTESTINAL ENDOSCOPY N/A 11/16/2021    EGD BIOPSY performed by Loulou Martinez MD at [address] [place] [address]:    Current Outpatient Medications:     pantoprazole (PROTONIX) 40 MG tablet, Take 1 tablet by mouth daily, Disp: 90 tablet, Rfl: 3    tiZANidine (ZANAFLEX) 2 MG tablet, TAKE ONE TABLET BY MOUTH EVERY 8 HOURS AS NEEDED, Disp: 90 tablet, Rfl: 0    sertraline (ZOLOFT) 25 MG tablet, TAKE ONE TABLET BY MOUTH DAILY, Disp: 30 tablet, Rfl: 3    traZODone (DESYREL) 50 MG tablet, Take 1 tablet by mouth nightly, Disp: 30 tablet, Rfl: 3    Probiotic Acidophilus (FLORANEX) TABS, Take 1 tablet by mouth daily, Disp: 30 tablet, Rfl: 5    ferrous sulfate (IRON 325) 325 (65 Fe) MG tablet, Take 1 tablet by mouth 2 times daily (Patient not taking: Reported on 12/16/2021), Disp: 180 tablet, Rfl: 1    gabapentin (NEURONTIN) 800 MG tablet, Take 1 tablet by mouth 3 times daily for 30 days. , Disp: 90 tablet, Rfl: 1    melatonin 3 MG TABS tablet, Take 1 tablet by mouth daily (Patient not taking: Reported on 12/16/2021), Disp: 30 tablet, Rfl: 3    calcium carbonate 600 MG TABS tablet, TAKE ONE TABLET BY MOUTH TWICE A DAY WITH VITAMIN D3 500 MG.  (Patient not taking: Reported on 12/16/2021), Disp: 60 tablet, Rfl: 5    naloxone 4 MG/0.1ML LIQD nasal spray, 1 spray by Nasal route as needed for Opioid Reversal (Patient not taking: Reported on 12/16/2021), Disp: 1 each, Rfl: 5    naloxone 4 MG/0.1ML LIQD nasal spray, 1 spray by Nasal route as needed for Opioid Reversal (Patient not taking: Reported on 12/16/2021), Disp: 1 each, Rfl: 5    naloxone 4 MG/0.1ML LIQD nasal spray, 1 spray by Nasal route as needed for Opioid Reversal (Patient not taking: Reported on 12/16/2021), Disp: 1 each, Rfl: 5    CALCIUM PLUS VITAMIN D3 600-500 MG-UNIT CAPS, TAKE 1 CAPSULE BY MOUTH TWICE A DAY (Patient not taking: Reported on 12/16/2021), Disp: 60 capsule, Rfl: 1    cimetidine (TAGAMET) 800 MG tablet, Take 1 tablet by mouth 2 times daily (Patient not taking: Reported on 12/16/2021), Disp: 30 tablet, Rfl: 3    Lactobacillus (ACIDOPHILUS) CAPS capsule, TAKE 1 CAPSULE EVERY DAY (Patient not taking: Reported on 12/16/2021), Disp: , Rfl: 5    zoster recombinant adjuvanted vaccine [facility]) 50 MCG/0.5ML SUSR injection, Inject 0.5 mLs into the muscle See Admin Instructions 1 dose now and repeat in 2-6 months (Patient not taking: Reported on 12/16/2021), Disp: 0.5 mL, Rfl: 1    ALLERGIES:   No Known Allergies    FAMILY HISTORY: History reviewed. No pertinent family history.       SOCIAL HISTORY:   Social History     Socioeconomic History    Marital status:      Spouse name: Not on file    Number of children: Not on file    Years of education: Not on file    Highest education level: Not on file   Occupational History    Not on file   Tobacco Use    Smoking status: Never Smoker    Smokeless tobacco: Never Used   Vaping Use    Vaping Use: Never used   Substance and Sexual Activity    Alcohol use: No    Drug use: No    Sexual activity: Not on file   Other Topics Concern    Not on file   Social History Narrative    Not on file     Social Determinants of Health     Financial Resource Strain: Low Risk     Difficulty of Paying Living Expenses: Not hard at all   Food Insecurity: No Food Insecurity    Worried About [address] in the Last Year: Never true    [address] [place] in the Last Year: Never true   Transportation Needs:     Lack of Transportation (Medical): Not on file    Lack of Transportation (Non-Medical): Not on file   Physical Activity:     Days of Exercise per Week: Not on file    Minutes of Exercise per Session: Not on file   Stress:     Feeling of Stress : Not on file   Social Connections:     Frequency of Communication with Friends and Family: Not on file    Frequency of Social Gatherings with Friends and Family: Not on file    Attends Evangelical Services: Not on file    Active Member of [address] [place] [address] or Organizations: Not on file    Attends Club or Organization Meetings: Not on file    Marital Status: Not on file   Intimate Partner Violence:     Fear of Current or Ex-Partner: Not on file    Emotionally Abused: Not on file    Physically Abused: Not on file    Sexually Abused: Not on file   Housing Stability:     Unable to Pay for Housing in the Last Year: Not on file    Number of [place] in the Last Year: Not on file    Unstable Housing in the Last Year: Not on file         REVIEW OF SYSTEMS:         Review of Systems   Constitutional: Negative for appetite change, fatigue and unexpected weight change. HENT: Negative for dental problem, sore throat, trouble swallowing and voice change. Eyes: Negative for visual disturbance. Respiratory: Negative for cough, choking, shortness of breath and wheezing. Cardiovascular: Negative for chest pain, palpitations and leg swelling. Gastrointestinal: Positive for constipation. Negative for abdominal distention, abdominal pain, anal bleeding, blood in stool, diarrhea, nausea, rectal pain and vomiting. Genitourinary: Negative for difficulty urinating. Musculoskeletal: Negative for arthralgias, back pain and joint swelling. Skin: Negative for color change and rash. Allergic/Immunologic: Negative for environmental allergies, food allergies and immunocompromised state. Neurological: Negative for dizziness, seizures, weakness, light-headedness, numbness and headaches.    Hematological: Bruises/bleeds easily. Psychiatric/Behavioral: Negative for confusion. The patient is not nervous/anxious. PHYSICAL EXAMINATION: Vital signs reviewed per the nursing documentation. /64   Pulse 65   Temp 98.3 °F (36.8 °C)   Wt 215 lb (97.5 kg)   BMI 39.32 kg/m²   Body mass index is 39.32 kg/m². Physical Exam  Nursing note reviewed. Constitutional:       Appearance: She is well-developed. Comments: Anxious    HENT:      Head: Normocephalic and atraumatic. Eyes:      Conjunctiva/sclera: Conjunctivae normal.      Pupils: Pupils are equal, round, and reactive to light. Cardiovascular:      Heart sounds: Normal heart sounds. Pulmonary:      Effort: Pulmonary effort is normal.      Breath sounds: Normal breath sounds. Abdominal:      General: Bowel sounds are normal.      Palpations: Abdomen is soft. Comments: NON TENDER, NON DISTENTED  LIVER SPLEEN AND HERNIAS ARE NOT  PALPABLE  BOWEL SOUNDS ARE POSITIVE      Musculoskeletal:         General: Normal range of motion. Cervical back: Normal range of motion and neck supple. Skin:     General: Skin is warm. Neurological:      Mental Status: She is alert and oriented to person, place, and time.    Psychiatric:         Behavior: Behavior normal.           LABORATORY DATA: Reviewed  Lab Results   Component Value Date    WBC 6.7 11/15/2021    HGB 10.0 (L) 11/18/2021    HCT 31.0 (L) 11/18/2021    MCV 99.3 11/15/2021     11/15/2021     11/18/2021    K 4.0 11/18/2021     11/18/2021    CO2 23 11/18/2021    BUN 16 11/18/2021    CREATININE 0.81 11/18/2021    LABALBU 3.9 11/15/2021    BILITOT 0.27 (L) 11/15/2021    ALKPHOS 51 11/15/2021    AST 20 11/15/2021    ALT 16 11/15/2021    INR 1.1 11/15/2021         Lab Results   Component Value Date    RBC 2.94 (L) 11/15/2021    HGB 10.0 (L) 11/18/2021    MCV 99.3 11/15/2021    MCH 31.0 11/15/2021    MCHC 31.2 11/15/2021    RDW 13.0 11/15/2021    MPV 10.9 11/15/2021

## 2021-12-21 ENCOUNTER — HOSPITAL ENCOUNTER (OUTPATIENT)
Age: 76
Discharge: HOME | End: 2021-12-21
Payer: MEDICARE

## 2021-12-21 ENCOUNTER — HOSPITAL ENCOUNTER (OUTPATIENT)
Dept: MAMMOGRAPHY | Age: 76
Discharge: HOME | End: 2021-12-23
Payer: MEDICARE

## 2021-12-21 DIAGNOSIS — K25.4 GASTROINTESTINAL HEMORRHAGE ASSOCIATED WITH GASTRIC ULCER: ICD-10-CM

## 2021-12-21 DIAGNOSIS — Z12.31 ENCOUNTER FOR SCREENING MAMMOGRAM FOR BREAST CANCER: ICD-10-CM

## 2021-12-21 DIAGNOSIS — K92.0 GASTROINTESTINAL HEMORRHAGE WITH HEMATEMESIS: ICD-10-CM

## 2021-12-21 DIAGNOSIS — D50.0 BLOOD LOSS ANEMIA: ICD-10-CM

## 2021-12-21 LAB
ABSOLUTE EOS #: 0.06 K/UL (ref 0–0.44)
ABSOLUTE IMMATURE GRANULOCYTE: <0.03 K/UL (ref 0–0.3)
ABSOLUTE LYMPH #: 1.42 K/UL (ref 1.1–3.7)
ABSOLUTE MONO #: 0.37 K/UL (ref 0.1–1.2)
BASOPHILS # BLD: 1 % (ref 0–2)
BASOPHILS ABSOLUTE: 0.05 K/UL (ref 0–0.2)
DIFFERENTIAL TYPE: ABNORMAL
EOSINOPHILS RELATIVE PERCENT: 1 % (ref 1–4)
HCT VFR BLD CALC: 37.1 % (ref 36.3–47.1)
HEMOGLOBIN: 11.4 G/DL (ref 11.9–15.1)
IMMATURE GRANULOCYTES: 0 %
LYMPHOCYTES # BLD: 33 % (ref 24–43)
MCH RBC QN AUTO: 31.1 PG (ref 25.2–33.5)
MCHC RBC AUTO-ENTMCNC: 30.7 G/DL (ref 28.4–34.8)
MCV RBC AUTO: 101.1 FL (ref 82.6–102.9)
MONOCYTES # BLD: 9 % (ref 3–12)
NRBC AUTOMATED: 0 PER 100 WBC
PDW BLD-RTO: 13.6 % (ref 11.8–14.4)
PLATELET # BLD: 201 K/UL (ref 138–453)
PLATELET ESTIMATE: ABNORMAL
PMV BLD AUTO: 12 FL (ref 8.1–13.5)
RBC # BLD: 3.67 M/UL (ref 3.95–5.11)
RBC # BLD: ABNORMAL 10*6/UL
SEG NEUTROPHILS: 56 % (ref 36–65)
SEGMENTED NEUTROPHILS ABSOLUTE COUNT: 2.44 K/UL (ref 1.5–8.1)
WBC # BLD: 4.3 K/UL (ref 3.5–11.3)
WBC # BLD: ABNORMAL 10*3/UL

## 2021-12-21 PROCEDURE — 77063 BREAST TOMOSYNTHESIS BI: CPT

## 2021-12-21 PROCEDURE — 85025 COMPLETE CBC W/AUTO DIFF WBC: CPT

## 2021-12-21 PROCEDURE — 36415 COLL VENOUS BLD VENIPUNCTURE: CPT

## 2022-01-03 DIAGNOSIS — B02.29 POST HERPETIC NEURALGIA: ICD-10-CM

## 2022-01-03 RX ORDER — GABAPENTIN 800 MG/1
TABLET ORAL
Qty: 90 TABLET | Refills: 1 | Status: SHIPPED | OUTPATIENT
Start: 2022-01-03 | End: 2022-01-31 | Stop reason: SDUPTHER

## 2022-01-03 NOTE — TELEPHONE ENCOUNTER
Lynnetta Standard is calling to request a refill on the following medication(s):    Last Visit Date (If Applicable):  23/4/1385    Next Visit Date:    3/3/2022    Medication Request:  Requested Prescriptions     Pending Prescriptions Disp Refills    gabapentin (NEURONTIN) 800 MG tablet [Pharmacy Med Name: GABAPENTIN 800 MG TABLET] 90 tablet 1     Sig: TAKE ONE TABLET BY MOUTH THREE TIMES A DAY

## 2022-01-31 DIAGNOSIS — R79.89 LOW SERUM CALCIUM: ICD-10-CM

## 2022-01-31 DIAGNOSIS — B02.29 POST HERPETIC NEURALGIA: ICD-10-CM

## 2022-01-31 DIAGNOSIS — Z78.0 ASYMPTOMATIC MENOPAUSE: ICD-10-CM

## 2022-01-31 DIAGNOSIS — F34.1 DYSTHYMIA: ICD-10-CM

## 2022-01-31 RX ORDER — GABAPENTIN 800 MG/1
TABLET ORAL
Qty: 90 TABLET | Refills: 2 | Status: SHIPPED | OUTPATIENT
Start: 2022-01-31 | End: 2022-07-05

## 2022-01-31 RX ORDER — TIZANIDINE 2 MG/1
TABLET ORAL
Qty: 90 TABLET | Refills: 0 | Status: SHIPPED | OUTPATIENT
Start: 2022-01-31 | End: 2022-02-28

## 2022-01-31 RX ORDER — SERTRALINE HYDROCHLORIDE 25 MG/1
TABLET, FILM COATED ORAL
Qty: 30 TABLET | Refills: 3 | Status: ON HOLD | OUTPATIENT
Start: 2022-01-31 | End: 2022-02-16

## 2022-01-31 NOTE — TELEPHONE ENCOUNTER
Carlie Mancera is calling to request a refill on the following medication(s):    Last Visit Date (If Applicable):  63/3/2623    Next Visit Date:    3/3/2022    Medication Request:  Requested Prescriptions     Pending Prescriptions Disp Refills    tiZANidine (ZANAFLEX) 2 MG tablet [Pharmacy Med Name: tiZANidine HCL 2MG TABLET] 90 tablet 0     Sig: TAKE ONE TABLET BY MOUTH EVERY 8 HOURS AS NEEDED    gabapentin (NEURONTIN) 800 MG tablet 90 tablet 1    calcium carbonate 1500 (600 Ca) MG TABS tablet 60 tablet 2     Sig: TAKE ONE TABLET BY MOUTH TWICE A DAY WITH VITAMIN D3    sertraline (ZOLOFT) 25 MG tablet 30 tablet 3     Sig: TAKE ONE TABLET BY MOUTH DAILY

## 2022-02-02 RX ORDER — BISACODYL 5 MG
TABLET, DELAYED RELEASE (ENTERIC COATED) ORAL
Qty: 2 TABLET | Refills: 0 | OUTPATIENT
Start: 2022-02-02

## 2022-02-16 ENCOUNTER — ANESTHESIA EVENT (OUTPATIENT)
Dept: OPERATING ROOM | Age: 77
End: 2022-02-16
Payer: MEDICARE

## 2022-02-16 ENCOUNTER — HOSPITAL ENCOUNTER (OUTPATIENT)
Age: 77
Setting detail: OUTPATIENT SURGERY
Discharge: HOME | End: 2022-02-16
Attending: INTERNAL MEDICINE | Admitting: INTERNAL MEDICINE
Payer: MEDICARE

## 2022-02-16 ENCOUNTER — ANESTHESIA (OUTPATIENT)
Dept: OPERATING ROOM | Age: 77
End: 2022-02-16
Payer: MEDICARE

## 2022-02-16 VITALS — TEMPERATURE: 96.8 F | SYSTOLIC BLOOD PRESSURE: 102 MMHG | OXYGEN SATURATION: 100 % | DIASTOLIC BLOOD PRESSURE: 55 MMHG

## 2022-02-16 VITALS
RESPIRATION RATE: 12 BRPM | SYSTOLIC BLOOD PRESSURE: 143 MMHG | HEIGHT: 61 IN | HEART RATE: 64 BPM | TEMPERATURE: 97.3 F | OXYGEN SATURATION: 100 % | DIASTOLIC BLOOD PRESSURE: 71 MMHG | WEIGHT: 121 LBS | BODY MASS INDEX: 22.84 KG/M2

## 2022-02-16 PROCEDURE — 2709999900 HC NON-CHARGEABLE SUPPLY: Performed by: INTERNAL MEDICINE

## 2022-02-16 PROCEDURE — 3700000001 HC ADD 15 MINUTES (ANESTHESIA): Performed by: INTERNAL MEDICINE

## 2022-02-16 PROCEDURE — 3609027000 HC COLONOSCOPY: Performed by: INTERNAL MEDICINE

## 2022-02-16 PROCEDURE — 7100000011 HC PHASE II RECOVERY - ADDTL 15 MIN: Performed by: INTERNAL MEDICINE

## 2022-02-16 PROCEDURE — 6360000002 HC RX W HCPCS: Performed by: SPECIALIST

## 2022-02-16 PROCEDURE — 7100000010 HC PHASE II RECOVERY - FIRST 15 MIN: Performed by: INTERNAL MEDICINE

## 2022-02-16 PROCEDURE — 3700000000 HC ANESTHESIA ATTENDED CARE: Performed by: INTERNAL MEDICINE

## 2022-02-16 PROCEDURE — 2500000003 HC RX 250 WO HCPCS: Performed by: SPECIALIST

## 2022-02-16 PROCEDURE — G0121 COLON CA SCRN NOT HI RSK IND: HCPCS | Performed by: INTERNAL MEDICINE

## 2022-02-16 PROCEDURE — 2580000003 HC RX 258: Performed by: ANESTHESIOLOGY

## 2022-02-16 RX ORDER — SODIUM CHLORIDE 0.9 % (FLUSH) 0.9 %
5-40 SYRINGE (ML) INJECTION PRN
Status: DISCONTINUED | OUTPATIENT
Start: 2022-02-16 | End: 2022-02-16 | Stop reason: HOSPADM

## 2022-02-16 RX ORDER — SODIUM CHLORIDE 0.9 % (FLUSH) 0.9 %
10 SYRINGE (ML) INJECTION EVERY 12 HOURS SCHEDULED
Status: DISCONTINUED | OUTPATIENT
Start: 2022-02-16 | End: 2022-02-16 | Stop reason: HOSPADM

## 2022-02-16 RX ORDER — LIDOCAINE HYDROCHLORIDE 20 MG/ML
INJECTION, SOLUTION EPIDURAL; INFILTRATION; INTRACAUDAL; PERINEURAL PRN
Status: DISCONTINUED | OUTPATIENT
Start: 2022-02-16 | End: 2022-02-16 | Stop reason: SDUPTHER

## 2022-02-16 RX ORDER — SODIUM CHLORIDE 9 MG/ML
25 INJECTION, SOLUTION INTRAVENOUS PRN
Status: DISCONTINUED | OUTPATIENT
Start: 2022-02-16 | End: 2022-02-16 | Stop reason: HOSPADM

## 2022-02-16 RX ORDER — HYDROMORPHONE HYDROCHLORIDE 1 MG/ML
0.25 INJECTION, SOLUTION INTRAMUSCULAR; INTRAVENOUS; SUBCUTANEOUS EVERY 5 MIN PRN
Status: DISCONTINUED | OUTPATIENT
Start: 2022-02-16 | End: 2022-02-16 | Stop reason: HOSPADM

## 2022-02-16 RX ORDER — LIDOCAINE HYDROCHLORIDE 10 MG/ML
1 INJECTION, SOLUTION EPIDURAL; INFILTRATION; INTRACAUDAL; PERINEURAL
Status: DISCONTINUED | OUTPATIENT
Start: 2022-02-16 | End: 2022-02-16 | Stop reason: HOSPADM

## 2022-02-16 RX ORDER — SODIUM CHLORIDE 0.9 % (FLUSH) 0.9 %
5-40 SYRINGE (ML) INJECTION EVERY 12 HOURS SCHEDULED
Status: DISCONTINUED | OUTPATIENT
Start: 2022-02-16 | End: 2022-02-16 | Stop reason: HOSPADM

## 2022-02-16 RX ORDER — ONDANSETRON 2 MG/ML
4 INJECTION INTRAMUSCULAR; INTRAVENOUS
Status: DISCONTINUED | OUTPATIENT
Start: 2022-02-16 | End: 2022-02-16 | Stop reason: HOSPADM

## 2022-02-16 RX ORDER — SODIUM CHLORIDE 0.9 % (FLUSH) 0.9 %
10 SYRINGE (ML) INJECTION PRN
Status: DISCONTINUED | OUTPATIENT
Start: 2022-02-16 | End: 2022-02-16 | Stop reason: HOSPADM

## 2022-02-16 RX ORDER — FENTANYL CITRATE 50 UG/ML
25 INJECTION, SOLUTION INTRAMUSCULAR; INTRAVENOUS EVERY 5 MIN PRN
Status: DISCONTINUED | OUTPATIENT
Start: 2022-02-16 | End: 2022-02-16 | Stop reason: HOSPADM

## 2022-02-16 RX ORDER — SODIUM CHLORIDE 9 MG/ML
INJECTION, SOLUTION INTRAVENOUS CONTINUOUS
Status: DISCONTINUED | OUTPATIENT
Start: 2022-02-16 | End: 2022-02-16 | Stop reason: HOSPADM

## 2022-02-16 RX ORDER — SODIUM CHLORIDE, SODIUM LACTATE, POTASSIUM CHLORIDE, CALCIUM CHLORIDE 600; 310; 30; 20 MG/100ML; MG/100ML; MG/100ML; MG/100ML
INJECTION, SOLUTION INTRAVENOUS CONTINUOUS
Status: DISCONTINUED | OUTPATIENT
Start: 2022-02-16 | End: 2022-02-16 | Stop reason: HOSPADM

## 2022-02-16 RX ORDER — PROPOFOL 10 MG/ML
INJECTION, EMULSION INTRAVENOUS PRN
Status: DISCONTINUED | OUTPATIENT
Start: 2022-02-16 | End: 2022-02-16 | Stop reason: SDUPTHER

## 2022-02-16 RX ADMIN — SODIUM CHLORIDE, POTASSIUM CHLORIDE, SODIUM LACTATE AND CALCIUM CHLORIDE: 600; 310; 30; 20 INJECTION, SOLUTION INTRAVENOUS at 11:43

## 2022-02-16 RX ADMIN — PROPOFOL 50 MG: 10 INJECTION, EMULSION INTRAVENOUS at 12:18

## 2022-02-16 RX ADMIN — LIDOCAINE HYDROCHLORIDE 60 MG: 20 INJECTION, SOLUTION EPIDURAL; INFILTRATION; INTRACAUDAL; PERINEURAL at 12:12

## 2022-02-16 RX ADMIN — PROPOFOL 100 MG: 10 INJECTION, EMULSION INTRAVENOUS at 12:12

## 2022-02-16 ASSESSMENT — PULMONARY FUNCTION TESTS
PIF_VALUE: 1
PIF_VALUE: 0
PIF_VALUE: 1
PIF_VALUE: 0
PIF_VALUE: 1
PIF_VALUE: 0
PIF_VALUE: 1
PIF_VALUE: 0
PIF_VALUE: 1
PIF_VALUE: 1

## 2022-02-16 ASSESSMENT — PAIN - FUNCTIONAL ASSESSMENT: PAIN_FUNCTIONAL_ASSESSMENT: 0-10

## 2022-02-16 ASSESSMENT — ENCOUNTER SYMPTOMS: SHORTNESS OF BREATH: 0

## 2022-02-16 NOTE — ANESTHESIA PRE PROCEDURE
Department of Anesthesiology  Preprocedure Note       Name:  [facility]   Age:  67 y.o.  :  1945                                          MRN:  [id number]         Date:  2022      Surgeon: Mara Delong):  Sawyer Araya MD    Procedure: Procedure(s):  COLORECTAL CANCER SCREENING, NOT HIGH RISK    Medications prior to admission:   Prior to Admission medications    Medication Sig Start Date End Date Taking?  Authorizing Provider   tiZANidine (ZANAFLEX) 2 MG tablet TAKE ONE TABLET BY MOUTH EVERY 8 HOURS AS NEEDED 22   Cyrus Sanders MD   gabapentin (NEURONTIN) 800 MG tablet TAKE ONE TABLET BY MOUTH THREE TIMES A DAY 22  Cyrus Sanders MD   calcium carbonate 1500 (600 Ca) MG TABS tablet TAKE ONE TABLET BY MOUTH TWICE A DAY WITH VITAMIN D3 22   Cyrus Sanders MD   sertraline (ZOLOFT) 25 MG tablet TAKE ONE TABLET BY MOUTH DAILY 22   Cyrus Sanders MD   pantoprazole (PROTONIX) 40 MG tablet Take 1 tablet by mouth daily 21   Sawyer Araya MD   polyethylene glycol [facility]) 17 GM/SCOOP powder Follow Instructions provided by physician's office 21   Sawyer Araya MD   bisacodyl (BISACODYL) 5 MG EC tablet Take 2 tablets in the morning 21   Sawyer Araya MD   ferrous sulfate (IRON 325) 325 (65 Fe) MG tablet Take 1 tablet by mouth 2 times daily  Patient not taking: Reported on 2021   Sofi Morgan MD   melatonin 3 MG TABS tablet Take 1 tablet by mouth daily  Patient not taking: Reported on 2021   Cyrus Sanders MD   sertraline (ZOLOFT) 25 MG tablet TAKE ONE TABLET BY MOUTH DAILY 21   Cyrus Sanders MD   naloxone 4 MG/0.1ML LIQD nasal spray 1 spray by Nasal route as needed for Opioid Reversal  Patient not taking: Reported on 2021   Cyrus Sanders MD   naloxone 4 MG/0.1ML LIQD nasal spray 1 spray by Nasal route as needed for Opioid Reversal  Patient not taking: Reported on 2021   Santos Pro Jaron Nielsen MD   naloxone 4 MG/0.1ML LIQD nasal spray 1 spray by Nasal route as needed for Opioid Reversal  Patient not taking: Reported on 12/16/2021 3/17/21   Ahmet Somers MD   CALCIUM PLUS VITAMIN D3 600-500 MG-UNIT CAPS TAKE 1 CAPSULE BY MOUTH TWICE A DAY  Patient not taking: Reported on 12/16/2021 8/3/20   Ahmet Somers MD   traZODone (DESYREL) 50 MG tablet Take 1 tablet by mouth nightly 6/11/20   Ahmet Somers MD   cimetidine (TAGAMET) 800 MG tablet Take 1 tablet by mouth 2 times daily  Patient not taking: Reported on 12/16/2021 6/11/20   Ahmet Somers MD   Probiotic Acidophilus [facility]) TABS Take 1 tablet by mouth daily 6/11/20   Ahmet Somers MD   Lactobacillus (ACIDOPHILUS) CAPS capsule TAKE 1 CAPSULE EVERY DAY  Patient not taking: Reported on 12/16/2021 9/6/19   Historical Provider, MD   zoster recombinant adjuvanted vaccine [facility]) 50 MCG/0.5ML SUSR injection Inject 0.5 mLs into the muscle See Admin Instructions 1 dose now and repeat in 2-6 months  Patient not taking: Reported on 12/16/2021 6/7/19   Ahmet Somers MD       Current medications:    No current outpatient medications on file. No current facility-administered medications for this visit.        Allergies:  No Known Allergies    Problem List:    Patient Active Problem List   Diagnosis Code    Colitis K52.9    Proctitis K62.89    Diarrhea of presumed infectious origin R19.7    Left ovarian cyst N83.202    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    Enterocolitis K52.9    Primary insomnia F51.01    Asymptomatic menopausal state  Z78.0    Gastrointestinal hemorrhage with hematemesis K92.0    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 (NSAID) K31.9, T39.395A    Post herpetic neuralgia B02.29    Gastrointestinal hemorrhage K92.2    Blood loss anemia D50.0       Past Medical History:        Diagnosis Date    Acid reflux     Shingles        Past Surgical History:        Procedure Applicable):  No results found for: LABABO, LABRH    Drug/Infectious Status (If Applicable):  Lab Results   Component Value Date    HEPCAB NONREACTIVE 08/05/2021       COVID-19 Screening (If Applicable): No results found for: COVID19        Anesthesia Evaluation   no history of anesthetic complications:   Airway: Mallampati: II  TM distance: >3 FB   Neck ROM: full  Mouth opening: > = 3 FB Dental:          Pulmonary:normal exam        (-) shortness of breath                           Cardiovascular:        (-)  angina                Neuro/Psych:               GI/Hepatic/Renal:   (+) bowel prep, morbid obesity          Endo/Other:    (+) blood dyscrasia: anemia:., .                 Abdominal:             Vascular: Other Findings:               Anesthesia Plan      general and TIVA     ASA 2       Induction: intravenous. MIPS: Prophylactic antiemetics administered. Anesthetic plan and risks discussed with patient. Plan discussed with CRNA.     Attending anesthesiologist reviewed and agrees with Preprocedure content              Solange Kelly MD   2/16/2022

## 2022-02-16 NOTE — OP NOTE
PROCEDURE NOTE    DATE OF PROCEDURE: 2/16/2022    SURGEON: Nikki Hall MD    ASSISTANT: None    PREOPERATIVE DIAGNOSIS: SCREENING      POSTOPERATIVE DIAGNOSIS: as described below    OPERATION: Total colonoscopy     ANESTHESIA: MAC PER ANESTHESIA     ESTIMATED BLOOD LOSS: less than 50     COMPLICATIONS: None. SPECIMENS:  Was Not Obtained    HISTORY: The patient is a 67y.o. year old female with history of above preop diagnosis. I recommended colonoscopy with possible biopsy or polypectomy and I explained the risk, benefits, expected outcome, and alternatives to the procedure. Risks included but are not limited to bleeding, infection, respiratory distress, hypotension, and perforation of the colon and possibility of missing a lesion. The patient understands and is in agreement. PROCEDURE: The patient was given IV conscious sedation. The patient's SPO2 remained above 90% throughout the procedure. The colonoscope was inserted per rectum and advanced under direct vision to the cecum without difficulty. The prep was good. Findings:  Terminal ileum: normal    Cecum/Ascending colon: normal    Transverse colon: normal    Descending/Sigmoid colon: abnormal: FEW SMALL DIVERTICULI    Rectum/Anus: examined in normal and retroflexed positions and was abnormal: MOD INT HEMORRHODIS    Withdrawal Time was (minutes): 12    The colon was decompressed and the scope was removed. The patient tolerated the procedure well. Recommendations/Plan:   1. Lifestyle and dietary modifications as discussed  2. F/U In Office in 3-4 weeks  3. Discussed with the family  4. Colonoscopy Recall :10 year  5. POST SEDATION PATIENT WAS STABLE WITH STABLE VITAL SIGNS AND OXYGEN SATURATIONS AND WAS DISCHARGED HOME WITH RIDE IN A STABLE CONDITION.     Electronically signed by Nikki Hall MD  on 2/16/2022 at 12:26 PM

## 2022-02-16 NOTE — H&P
History and Physical Service   [facility] - INPATIENT    HISTORY AND PHYSICAL EXAMINATION            Date of Evaluation: 2/16/2022  Patient name:  Shefali Alvarenga  MRN:   [id number]  YOB: 1945  PCP:    Armond Bell MD    History Obtained From:     Patient, Medical records    History of Present Illness: This is Shefali Alvarenga a 67 y.o. female who presents today for a colorectal cancer screening, not high risk by Dr. Wilma Tinoco for anemia, screening. Pt was hospitalized at [facility] in 11/2021 for GI bleeding and hematemesis. EGD on 11/16/21 revealed a small hiatal hernia and four 5 mm ulcers with edema and white exudates per the procedure note by Dr. Wilma Tinoco. Please see surgical pathology report below. Hemoglobin was 11.4 g/dL and hematocrit was 37.1% on 12/21/21. Pt states she is not taking NSAIDs at this time. The patient completed the bowel prep as directed and now has watery clear stool. No family history of colon cancer. This is the pt's first colonoscopy. Pt denies rectal bleeding, black tarry stools, diarrhea, constipation, nausea, vomiting, fever, chills, night sweats, bloating, abdominal pain, and unexplained weight loss. History of acid reflux. Pt denies history of IBS and diabetes. Pt denies taking blood thinning medications in the past 10 days. Surgical Pathology Report  SURGICAL PATHOLOGY REPORT  Collected: 11/16/21 1453   Lab status: Final   Resulting lab: [employer]   Value: -- Diagnosis --   1.  STOMACH, ANTRUM, BIOPSY:        -  NORMAL GASTRIC MUCOSA. 2.  ESOPHAGUS, BIOPSIES:             -  SQUAMOCOLUMNAR MUCOSA WITH GOBLET CELL INTESTINAL   METAPLASIA, CONSISTENT WITH \"BENOIT'S METAPLASIA\".      -  NEGATIVE FOR DYSPLASIA OR MALIGNANCY.         Past Medical History:     Past Medical History:   Diagnosis Date    Acid reflux     Shingles         Past Surgical History:     Past Surgical History:   Procedure Laterality Date    APPENDECTOMY      CHOLECYSTECTOMY      HYSTERECTOMY      UPPER GASTROINTESTINAL ENDOSCOPY N/A 11/16/2021    EGD BIOPSY performed by Osvaldo Rose MD at [address] [facility]        Medications Prior to Admission:     Prior to Admission medications    Medication Sig Start Date End Date Taking?  Authorizing Provider   tiZANidine (ZANAFLEX) 2 MG tablet TAKE ONE TABLET BY MOUTH EVERY 8 HOURS AS NEEDED 1/31/22  Yes Leticia Tripp MD   gabapentin (NEURONTIN) 800 MG tablet TAKE ONE TABLET BY MOUTH THREE TIMES A DAY 1/31/22 2/28/22 Yes Leticia Tripp MD   pantoprazole (PROTONIX) 40 MG tablet Take 1 tablet by mouth daily 12/16/21  Yes Osvaldo Rose MD   ferrous sulfate (IRON 325) 325 (65 Fe) MG tablet Take 1 tablet by mouth 2 times daily 11/17/21  Yes Arlin Bryan MD   melatonin 3 MG TABS tablet Take 1 tablet by mouth daily 7/13/21  Yes Leticia Tripp MD   sertraline (ZOLOFT) 25 MG tablet TAKE ONE TABLET BY MOUTH DAILY 7/13/21  Yes Leticia Tripp MD   CALCIUM PLUS VITAMIN D3 600-500 MG-UNIT CAPS TAKE 1 CAPSULE BY MOUTH TWICE A DAY 8/3/20  Yes Leticia Tripp MD   calcium carbonate 1500 (600 Ca) MG TABS tablet TAKE ONE TABLET BY MOUTH TWICE A DAY WITH VITAMIN D3 1/31/22   Leticia Tripp MD   naloxone 4 MG/0.1ML LIQD nasal spray 1 spray by Nasal route as needed for Opioid Reversal  Patient not taking: Reported on 12/16/2021 5/24/21   Leticia Tripp MD   naloxone 4 MG/0.1ML LIQD nasal spray 1 spray by Nasal route as needed for Opioid Reversal  Patient not taking: Reported on 12/16/2021 4/20/21   Leticia Tripp MD   naloxone 4 MG/0.1ML LIQD nasal spray 1 spray by Nasal route as needed for Opioid Reversal  Patient not taking: Reported on 12/16/2021 3/17/21   Leticia Tripp MD   traZODone (DESYREL) 50 MG tablet Take 1 tablet by mouth nightly 6/11/20   Leticia Tripp MD   cimetidine (TAGAMET) 800 MG tablet Take 1 tablet by mouth 2 times daily  Patient not taking: Reported on 12/16/2021 6/11/20   Leticia Tripp MD Probiotic Acidophilus (FLORANEX) TABS Take 1 tablet by mouth daily 6/11/20   Abraham Lopez MD   Lactobacillus (ACIDOPHILUS) CAPS capsule TAKE 1 CAPSULE EVERY DAY  Patient not taking: Reported on 12/16/2021 9/6/19   Historical Provider, MD   zoster recombinant adjuvanted vaccine [facility]) 50 MCG/0.5ML SUSR injection Inject 0.5 mLs into the muscle See Admin Instructions 1 dose now and repeat in 2-6 months  Patient not taking: Reported on 12/16/2021 6/7/19   Abraham Lopez MD        Allergies:     Patient has no known allergies. Social History:     Tobacco:    reports that she has never smoked. She has never used smokeless tobacco.  Alcohol:      reports no history of alcohol use. Drug Use:  reports no history of drug use. Family History:     No family history on file. Review of Systems:     Positive and Negative as described in HPI. CONSTITUTIONAL: Negative for fevers, chills, sweats, fatigue, and weight loss. HEENT: Negative for glasses, hearing changes, rhinorrhea, and throat pain  RESPIRATORY: Negative for shortness of breath, cough, congestion, and wheezing. CARDIOVASCULAR: Negative for chest pain, blood clot, irregular heart beat, and palpitations. GASTROINTESTINAL: See HPI. GENITOURINARY: Negative for hematuria, difficulty of urination, burning with urination,and frequency. INTEGUMENT: Negative for rash, skin lesions, and easy bruising. HEMATOLOGIC/LYMPHATIC: Negative for swelling/edema. ALLERGIC/IMMUNOLOGIC: Negative for urticaria and itching. ENDOCRINE: Negative for increase in drinking, increase in urination, heat or cold intolerance. MUSCULOSKELETAL: Negative joint pains, muscle aches, and swelling of joints. NEUROLOGICAL: Negative for headaches, dizziness, lightheadedness, numbness, and tingling extremities. BEHAVIOR/PSYCH: Negative for depression and anxiety.     Physical Exam:   BP (!) 152/68   Pulse 77   Temp 97.3 °F (36.3 °C) (Temporal)   Resp 16   Ht 5' 1\" (1.549 m)   Wt 121 lb (54.9 kg)   SpO2 99%   BMI 22.86 kg/m²   No LMP recorded. Patient is postmenopausal.  No results for input(s): POCGLU in the last 72 hours. General Appearance:  Alert, well appearing, and in no acute distress. Mental status: Oriented to person, place, and time. Head: Normocephalic and atraumatic. Eye: No icterus, redness, pupils equal and reactive, extraocular eye movements intact, and conjunctiva clear. Ear: Hearing grossly intact. Nose: No drainage noted. Mouth: Mucous membranes moist.  Neck: Supple and no carotid bruits noted. Lungs: Bilateral equal air entry, clear to auscultation, no wheezing, rales or rhonchi, and normal effort. Cardiovascular: Normal rate, regular rhythm, no murmur, gallop, and rub. Abdomen: Soft, nontender, nondistended, and active bowel sounds. Neurologic: Normal speech and cranial nerves II through XII grossly intact. Skin: No gross lesions, rashes, bruising, or bleeding on exposed skin area. Extremities: Posterior tibial pulses are palpable bilaterally. No pedal edema. No calf tenderness with palpation. Psych: Normal affect. Investigations:      Laboratory Testing:  No results found for this or any previous visit (from the past 24 hour(s)). No results for input(s): HGB, HCT, WBC, MCV, PLATELET, NA, K, CL, CO2, BUN, CREATININE, GLUCOSE, INR, PROTIME, APTT, AST, ALT, LABALBU, HCG in the last 720 hours. No results for input(s): COVID19 in the last 720 hours. Diagnosis:      1. Anemia, screening    Plans:     1.  Colorectal cancer screening, not high risk      JOSELYN Francis CNP  2/16/2022  11:38 AM

## 2022-02-16 NOTE — ANESTHESIA POSTPROCEDURE EVALUATION
Department of Anesthesiology  Postprocedure Note    Patient: Satish Winn  MRN: [id number]  YOB: 1945  Date of evaluation: 2/16/2022  Time:  1:48 PM     Procedure Summary     Date: 02/16/22 [facility] - [facility]PATIENT    Anesthesia Start: 1209 Anesthesia Stop: 2231    Procedure: COLORECTAL CANCER SCREENING, NOT HIGH RISK (N/A Anus) Diagnosis: (DX ANEMIA, SCREENING)    Surgeons: Dequan Santana MD Responsible Provider: Ewa Link MD    Anesthesia Type: general, TIVA ASA Status: 2          Anesthesia Type: general, TIVA    Tushar Phase I: Tushar Score: 8    Tushar Phase II:      Last vitals: Reviewed and per EMR flowsheets.        Anesthesia Post Evaluation    Patient location during evaluation: PACU  Patient participation: complete - patient participated  Level of consciousness: awake  Airway patency: patent  Nausea & Vomiting: no nausea  Complications: no  Cardiovascular status: blood pressure returned to baseline  Respiratory status: acceptable  Hydration status: euvolemic  Comments: Multimodal analgesia pain management as indicated by procedure  Multimodal analgesia pain management approach

## 2022-02-28 RX ORDER — TIZANIDINE 2 MG/1
TABLET ORAL
Qty: 90 TABLET | Refills: 0 | Status: SHIPPED | OUTPATIENT
Start: 2022-02-28 | End: 2022-03-28

## 2022-03-03 ENCOUNTER — OFFICE VISIT (OUTPATIENT)
Dept: FAMILY MEDICINE CLINIC | Age: 77
End: 2022-03-03
Payer: MEDICARE

## 2022-03-03 VITALS
BODY MASS INDEX: 23.43 KG/M2 | SYSTOLIC BLOOD PRESSURE: 129 MMHG | TEMPERATURE: 98.1 F | DIASTOLIC BLOOD PRESSURE: 80 MMHG | HEART RATE: 80 BPM | WEIGHT: 124 LBS | OXYGEN SATURATION: 97 %

## 2022-03-03 DIAGNOSIS — B02.29 POST HERPETIC NEURALGIA: Primary | ICD-10-CM

## 2022-03-03 PROCEDURE — 1036F TOBACCO NON-USER: CPT | Performed by: FAMILY MEDICINE

## 2022-03-03 PROCEDURE — G8484 FLU IMMUNIZE NO ADMIN: HCPCS | Performed by: FAMILY MEDICINE

## 2022-03-03 PROCEDURE — 1090F PRES/ABSN URINE INCON ASSESS: CPT | Performed by: FAMILY MEDICINE

## 2022-03-03 PROCEDURE — G8427 DOCREV CUR MEDS BY ELIG CLIN: HCPCS | Performed by: FAMILY MEDICINE

## 2022-03-03 PROCEDURE — G8420 CALC BMI NORM PARAMETERS: HCPCS | Performed by: FAMILY MEDICINE

## 2022-03-03 PROCEDURE — 4040F PNEUMOC VAC/ADMIN/RCVD: CPT | Performed by: FAMILY MEDICINE

## 2022-03-03 PROCEDURE — 99213 OFFICE O/P EST LOW 20 MIN: CPT | Performed by: FAMILY MEDICINE

## 2022-03-03 PROCEDURE — G8399 PT W/DXA RESULTS DOCUMENT: HCPCS | Performed by: FAMILY MEDICINE

## 2022-03-03 PROCEDURE — 1123F ACP DISCUSS/DSCN MKR DOCD: CPT | Performed by: FAMILY MEDICINE

## 2022-03-03 ASSESSMENT — PATIENT HEALTH QUESTIONNAIRE - PHQ9
SUM OF ALL RESPONSES TO PHQ QUESTIONS 1-9: 0
SUM OF ALL RESPONSES TO PHQ QUESTIONS 1-9: 0
5. POOR APPETITE OR OVEREATING: 0
9. THOUGHTS THAT YOU WOULD BE BETTER OFF DEAD, OR OF HURTING YOURSELF: 0
3. TROUBLE FALLING OR STAYING ASLEEP: 0
10. IF YOU CHECKED OFF ANY PROBLEMS, HOW DIFFICULT HAVE THESE PROBLEMS MADE IT FOR YOU TO DO YOUR WORK, TAKE CARE OF THINGS AT HOME, OR GET ALONG WITH OTHER PEOPLE: 0
2. FEELING DOWN, DEPRESSED OR HOPELESS: 0
7. TROUBLE CONCENTRATING ON THINGS, SUCH AS READING THE NEWSPAPER OR WATCHING TELEVISION: 0
6. FEELING BAD ABOUT YOURSELF - OR THAT YOU ARE A FAILURE OR HAVE LET YOURSELF OR YOUR FAMILY DOWN: 0
4. FEELING TIRED OR HAVING LITTLE ENERGY: 0
SUM OF ALL RESPONSES TO PHQ QUESTIONS 1-9: 0
SUM OF ALL RESPONSES TO PHQ9 QUESTIONS 1 & 2: 0
1. LITTLE INTEREST OR PLEASURE IN DOING THINGS: 0
8. MOVING OR SPEAKING SO SLOWLY THAT OTHER PEOPLE COULD HAVE NOTICED. OR THE OPPOSITE, BEING SO FIGETY OR RESTLESS THAT YOU HAVE BEEN MOVING AROUND A LOT MORE THAN USUAL: 0
SUM OF ALL RESPONSES TO PHQ QUESTIONS 1-9: 0

## 2022-03-03 NOTE — Clinical Note
[facility]  [address] [place] [address] [address] [facility] [address]  Phone: [phone]  Fax: [phone]    Osiris Alfred MD        March 3, 2022     Patient: Rodney Paulino   YOB: 1945   Date of Visit: 3/3/2022       To Whom It May Concern: It is my medical opinion that Ebonie Guzmán {Work release (duty restriction):14139}. If you have any questions or concerns, please don't hesitate to call.     Sincerely,        Osiris Alfred MD

## 2022-03-03 NOTE — PROGRESS NOTES
General FM note    Oswaldo Cano is a 67 y.o. female who presents today for follow up on her  medical conditions as noted below. 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   Current Outpatient Medications   Medication Sig Dispense Refill    tiZANidine (ZANAFLEX) 2 MG tablet TAKE ONE TABLET BY MOUTH EVERY 8 HOURS AS NEEDED 90 tablet 0    calcium carbonate 1500 (600 Ca) MG TABS tablet TAKE ONE TABLET BY MOUTH TWICE A DAY WITH VITAMIN D3 (Patient taking differently: Indications: Pt taking once, TAKE ONE TABLET BY MOUTH TWICE A DAY WITH VITAMIN D3) 60 tablet 2    pantoprazole (PROTONIX) 40 MG tablet Take 1 tablet by mouth daily 90 tablet 3    ferrous sulfate (IRON 325) 325 (65 Fe) MG tablet Take 1 tablet by mouth 2 times daily 180 tablet 1    melatonin 3 MG TABS tablet Take 1 tablet by mouth daily 30 tablet 3    sertraline (ZOLOFT) 25 MG tablet TAKE ONE TABLET BY MOUTH DAILY 30 tablet 3    CALCIUM PLUS VITAMIN D3 600-500 MG-UNIT CAPS TAKE 1 CAPSULE BY MOUTH TWICE A DAY 60 capsule 1    traZODone (DESYREL) 50 MG tablet Take 1 tablet by mouth nightly 30 tablet 3    Probiotic Acidophilus (FLORANEX) TABS Take 1 tablet by mouth daily 30 tablet 5    gabapentin (NEURONTIN) 800 MG tablet TAKE ONE TABLET BY MOUTH THREE TIMES A DAY 90 tablet 2    naloxone 4 MG/0.1ML LIQD nasal spray 1 spray by Nasal route as needed for Opioid Reversal (Patient not taking: Reported on 12/16/2021) 1 each 5    naloxone 4 MG/0.1ML LIQD nasal spray 1 spray by Nasal route as needed for Opioid Reversal (Patient not taking: Reported on 12/16/2021) 1 each 5    naloxone 4 MG/0.1ML LIQD nasal spray 1 spray by Nasal route as needed for Opioid Reversal (Patient not taking: Reported on 12/16/2021) 1 each 5    cimetidine (TAGAMET) 800 MG tablet Take 1 tablet by mouth 2 times daily (Patient not taking: Reported on 12/16/2021) 30 tablet 3    Lactobacillus (ACIDOPHILUS) CAPS capsule TAKE 1 CAPSULE EVERY DAY (Patient not taking: Reported on 12/16/2021)  5    zoster recombinant adjuvanted vaccine [facility]) 50 MCG/0.5ML SUSR injection Inject 0.5 mLs into the muscle See Admin Instructions 1 dose now and repeat in 2-6 months (Patient not taking: Reported on 12/16/2021) 0.5 mL 1     No current facility-administered medications for this visit. ALLERGIES:  No Known Allergies    Social History     Tobacco Use    Smoking status: Never Smoker    Smokeless tobacco: Never Used   Substance Use Topics    Alcohol use: No      Body mass index is 23.43 kg/m². /80   Pulse 80   Temp 98.1 °F (36.7 °C)   Wt 124 lb (56.2 kg)   SpO2 97%   BMI 23.43 kg/m²     Subjective:      HPI    67 y.o. female coming today for follow-up. She states that she really has not been using the tramadol for her post herpetic neuralgia pain most of the gabapentin which has worked quite well for her.     She states that she did have another fall down the stairs at her house where she lives with her daughter. There are no railings and she says that the light was not working that is why she tripped over stair. She fell on her right knee but she says that she can move it right away just for a little bit. Review of Systems   Constitutional: Negative for fever and unexpected weight change. Pertinent items are noted in HPI. Objective:   Physical Exam  Constitutional: VS (see above). General appearance: normal development, habitus and attention, no deformities. No distress. Eyes: normal conjunctiva and lids. CAV: RRR, no RMG. No edema lower extremities. Pulmo: CTA bilateral, no CWR. Skin: no rashes, lesions or ulcers. Musculoskeletal: normal gait. Nails: no clubbing or cyanosis. Psychiatric: alert and oriented to place, time and person. Normal mood and affect. Assessment:       Diagnosis Orders   1. Post herpetic neuralgia         Plan:   Continue current care. I discussed with patient that she needs to have some railing or some banister for the stairs and that should be a working light. Patient will call for any changes. Return in about 6 months (around 9/3/2022), or if symptoms worsen or fail to improve, for medicare visit. No orders of the defined types were placed in this encounter. No orders of the defined types were placed in this encounter. Call or return to clinic prn if these symptoms worsen or fail to improve as anticipated. I have reviewed the instructions with the patient, answering all questions to patient's satisfaction. Kike Husbands received counseling on the following healthy behaviors: nutrition, exercise, and medication adherence  Reviewed prior labs and health maintenance. Continue current medications, diet and exercise. Discussed use, benefit, and side effects of prescribed medications. Barriers to medication compliance addressed. Patient given educational materials - see patient instructions.     All patient questions answered. Patient voiced understanding.       Electronically signed by Lore Dangeol MD on 3/3/2022 at 12:42 PM       (Please note that portions of this note were completed with a voice recognition program. Efforts were made to edit the dictations but occasionally words are mis-transcribed.)

## 2022-03-03 NOTE — LETTER
[facility]  [address] [place] [address] [address] [facility] [address]  Phone: [phone]  Fax: [phone]    Chente Nagel MD         March 3, 2022     Patient: Pretty Francois   YOB: 1945   Date of Visit: 3/3/2022       To Whom It May Concern: It is my medical opinion that Vianey Bartholomew requires a disability parking placard for the following reasons:  She has limited walking ability due to an arthritic condition. Duration of need: 5 years    If you have any questions or concerns, please don't hesitate to call. Sincerely,    Dr. Chente Nagel M.D.         Chente Nagel MD

## 2022-03-18 ENCOUNTER — TELEPHONE (OUTPATIENT)
Dept: GASTROENTEROLOGY | Age: 77
End: 2022-03-18

## 2022-03-18 NOTE — TELEPHONE ENCOUNTER
Pt had a no show on 03/17/22. Attempted to call pt and padmini. Pt has a VM not set up.      Letter sent

## 2022-03-28 DIAGNOSIS — F34.1 DYSTHYMIA: ICD-10-CM

## 2022-03-28 RX ORDER — TIZANIDINE 2 MG/1
TABLET ORAL
Qty: 90 TABLET | Refills: 0 | Status: SHIPPED | OUTPATIENT
Start: 2022-03-28 | End: 2022-05-04

## 2022-03-28 RX ORDER — SERTRALINE HYDROCHLORIDE 25 MG/1
TABLET, FILM COATED ORAL
Qty: 90 TABLET | Refills: 1 | Status: SHIPPED | OUTPATIENT
Start: 2022-03-28

## 2022-03-28 NOTE — TELEPHONE ENCOUNTER
Glenn Seymour is calling to request a refill on the following medication(s):    Last Visit Date (If Applicable):  1/9/1414    Next Visit Date:    9/14/2022    Medication Request:  Requested Prescriptions     Pending Prescriptions Disp Refills    tiZANidine (ZANAFLEX) 2 MG tablet [Pharmacy Med Name: tiZANidine HCL 2MG TABLET] 90 tablet 0     Sig: TAKE ONE TABLET BY MOUTH EVERY 8 HOURS AS NEEDED

## 2022-03-28 NOTE — TELEPHONE ENCOUNTER
Pharm requested    Health Maintenance   Topic Date Due    COVID-19 Vaccine (3 - Booster for [employer] series) 07/18/2021    Annual Wellness Visit (AWV)  08/03/2022    Depression Monitoring  03/03/2023    DTaP/Tdap/Td vaccine (2 - Td or Tdap) 09/14/2030    DEXA (modify frequency per FRAX score)  Completed    Flu vaccine  Completed    Shingles Vaccine  Completed    Pneumococcal 65+ years Vaccine  Completed    Hepatitis C screen  Completed    Hepatitis A vaccine  Aged Out    Hepatitis B vaccine  Aged Out    Hib vaccine  Aged Out    Meningococcal (ACWY) vaccine  Aged Out             (applicable per patient's age: Cancer Screenings, Depression Screening, Fall Risk Screening, Immunizations)    LDL Cholesterol (mg/dL)   Date Value   03/19/2019 116     AST (U/L)   Date Value   11/15/2021 20     ALT (U/L)   Date Value   11/15/2021 16     BUN (mg/dL)   Date Value   11/18/2021 16      (goal A1C is < 7)   (goal LDL is <100) need 30-50% reduction from baseline     BP Readings from Last 3 Encounters:   03/03/22 129/80   02/16/22 (!) 143/71   02/16/22 (!) 102/55    (goal /80)      All Future Testing planned in [facility]:  Lab Frequency Next Occurrence   CBC Auto Differential Once 08/02/2021   TSH with Reflex Once 11/02/2021   Comprehensive Metabolic Panel Once 07/64/1778   Lipid Panel Once 08/02/2021   COLONOSCOPY W/ OR W/O BIOPSY Once 03/18/2022   Hemoglobin and Hematocrit, Blood, Post Transfusion POST TRANSFUSION    Hemoglobin and Hematocrit, Blood, Post Transfusion POST TRANSFUSION        Next Visit Date:  Future Appointments   Date Time Provider Dusty Belle   9/14/2022 10:30 AM Armond Bell MD [facility]            Patient Active Problem List:     Colitis     Proctitis     Diarrhea of presumed infectious origin     Left ovarian cyst     Recurrent colitis due to Clostridium difficile     Enterocolitis     Primary insomnia     Asymptomatic menopausal state      Gastrointestinal hemorrhage with hematemesis     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

## 2022-05-04 RX ORDER — TIZANIDINE 2 MG/1
TABLET ORAL
Qty: 90 TABLET | Refills: 0 | Status: SHIPPED | OUTPATIENT
Start: 2022-05-04 | End: 2022-07-05

## 2022-05-04 NOTE — TELEPHONE ENCOUNTER
Pharm requested    Health Maintenance   Topic Date Due    COVID-19 Vaccine (3 - Booster for [employer] series) 07/18/2021    Annual Wellness Visit (AWV)  08/03/2022    Depression Monitoring  03/03/2023    DTaP/Tdap/Td vaccine (2 - Td or Tdap) 09/14/2030    DEXA (modify frequency per FRAX score)  Completed    Flu vaccine  Completed    Shingles vaccine  Completed    Pneumococcal 65+ years Vaccine  Completed    Hepatitis C screen  Completed    Hepatitis A vaccine  Aged Out    Hepatitis B vaccine  Aged Out    Hib vaccine  Aged Out    Meningococcal (ACWY) vaccine  Aged Out             (applicable per patient's age: Cancer Screenings, Depression Screening, Fall Risk Screening, Immunizations)    LDL Cholesterol (mg/dL)   Date Value   03/19/2019 116     AST (U/L)   Date Value   11/15/2021 20     ALT (U/L)   Date Value   11/15/2021 16     BUN (mg/dL)   Date Value   11/18/2021 16      (goal A1C is < 7)   (goal LDL is <100) need 30-50% reduction from baseline     BP Readings from Last 3 Encounters:   03/03/22 129/80   02/16/22 (!) 143/71   02/16/22 (!) 102/55    (goal /80)      All Future Testing planned in [facility]:  Lab Frequency Next Occurrence   CBC Auto Differential Once 08/02/2021   TSH with Reflex Once 11/02/2021   Comprehensive Metabolic Panel Once 18/77/5806   Lipid Panel Once 08/02/2021   COLONOSCOPY W/ OR W/O BIOPSY Once 03/18/2022   Hemoglobin and Hematocrit, Blood, Post Transfusion POST TRANSFUSION    Hemoglobin and Hematocrit, Blood, Post Transfusion POST TRANSFUSION        Next Visit Date:  Future Appointments   Date Time Provider Dusty Belle   5/4/2022  2:40 PM MD JOSE ANGEL [facility]   9/14/2022 10:30 AM MD JOSE ANGEL [facility] [address] [facility]            Patient Active Problem List:     Colitis     Proctitis     Diarrhea of presumed infectious origin     Left ovarian cyst     Recurrent colitis due to Clostridium difficile     Enterocolitis     Primary insomnia Asymptomatic menopausal state      Gastrointestinal hemorrhage with hematemesis     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

## 2022-07-05 DIAGNOSIS — B02.29 POST HERPETIC NEURALGIA: ICD-10-CM

## 2022-07-05 RX ORDER — GABAPENTIN 800 MG/1
TABLET ORAL
Qty: 90 TABLET | Refills: 2 | Status: SHIPPED | OUTPATIENT
Start: 2022-07-05 | End: 2022-10-03

## 2022-07-05 RX ORDER — TIZANIDINE 2 MG/1
TABLET ORAL
Qty: 90 TABLET | Refills: 0 | Status: SHIPPED | OUTPATIENT
Start: 2022-07-05 | End: 2022-09-06

## 2022-07-05 NOTE — TELEPHONE ENCOUNTER
Stephanie Serrano is calling to request a refill on the following medication(s):    Last Visit Date (If Applicable):  9/3/8484    Next Visit Date:    9/14/2022    Medication Request:  Requested Prescriptions     Pending Prescriptions Disp Refills    tiZANidine (ZANAFLEX) 2 MG tablet [Pharmacy Med Name: tiZANidine HCL 2MG TABLET] 90 tablet 0     Sig: TAKE ONE TABLET BY MOUTH EVERY 8 HOURS AS NEEDED    gabapentin (NEURONTIN) 800 MG tablet [Pharmacy Med Name: GABAPENTIN 800 MG TABLET] 90 tablet 2     Sig: TAKE ONE TABLET BY MOUTH THREE TIMES A DAY

## 2022-09-06 RX ORDER — TIZANIDINE 2 MG/1
TABLET ORAL
Qty: 90 TABLET | Refills: 0 | Status: SHIPPED | OUTPATIENT
Start: 2022-09-06 | End: 2022-10-31

## 2022-09-15 ENCOUNTER — TELEPHONE (OUTPATIENT)
Dept: FAMILY MEDICINE CLINIC | Age: 77
End: 2022-09-15

## 2022-09-15 NOTE — TELEPHONE ENCOUNTER
CALLED TO [address] [place] [address] 9.14.22 APPT-VM HAS NOT BEEN SET UP YET.  NS LETTER SENT.

## 2022-10-03 DIAGNOSIS — B02.29 POST HERPETIC NEURALGIA: ICD-10-CM

## 2022-10-03 RX ORDER — GABAPENTIN 800 MG/1
TABLET ORAL
Qty: 90 TABLET | Refills: 2 | Status: SHIPPED | OUTPATIENT
Start: 2022-10-03 | End: 2022-11-03

## 2022-10-31 RX ORDER — TIZANIDINE 2 MG/1
TABLET ORAL
Qty: 90 TABLET | Refills: 0 | Status: SHIPPED | OUTPATIENT
Start: 2022-10-31

## 2022-12-02 NOTE — TELEPHONE ENCOUNTER
John Batres is calling to request a refill on the following medication(s):    Last Visit Date (If Applicable):  0/8/1921    Next Visit Date:    Visit date not found    Medication Request:  Requested Prescriptions     Pending Prescriptions Disp Refills    tiZANidine (ZANAFLEX) 2 MG tablet [Pharmacy Med Name: tiZANidine HCL 2MG TABLET] 90 tablet 0     Sig: TAKE ONE TABLET BY MOUTH EVERY 8 HOURS AS NEEDED

## 2022-12-03 RX ORDER — TIZANIDINE 2 MG/1
TABLET ORAL
Qty: 90 TABLET | Refills: 0 | Status: SHIPPED | OUTPATIENT
Start: 2022-12-03

## (undated) DEVICE — JELLY,LUBE,STERILE,FLIP TOP,TUBE,2-OZ: Brand: MEDLINE

## (undated) DEVICE — MEDICINE CUP, GRADUATED, STER: Brand: MEDLINE

## (undated) DEVICE — Device: Brand: DEFENDO VALVE AND CONNECTOR KIT

## (undated) DEVICE — SYRINGE MED 50ML LUERLOCK TIP

## (undated) DEVICE — GAUZE,SPONGE,4"X4",16PLY,STRL,LF,10/TRAY: Brand: MEDLINE

## (undated) DEVICE — BASIN EMSIS 700ML GRAPHITE PLAS KID SHP GRAD

## (undated) DEVICE — ADAPTER TBNG LUER STUB 15 GA INTMED

## (undated) DEVICE — GOWN,AURORA,NONREINFORCED,LARGE: Brand: MEDLINE

## (undated) DEVICE — CO2 CANNULA,SUPERSOFT, ADLT,7'O2,7'CO2: Brand: MEDLINE

## (undated) DEVICE — TUBING, SUCTION, 1/4" X 12', STRAIGHT: Brand: MEDLINE

## (undated) DEVICE — SINGLE-USE BIOPSY FORCEPS: Brand: RADIAL JAW 4

## (undated) DEVICE — BLOCK BITE 60FR RUBBER ADLT DENTAL

## (undated) DEVICE — TRAP SURG QUAD PARABOLA SLOT DSGN SFTY SCRN TRAPEASE

## (undated) DEVICE — CUP MED 1OZ CLR POLYPR FEED GRAD W/O LID

## (undated) DEVICE — GLOVE SURG 8 11.7IN BEAD CUF LIGHT BRN SENSICARE LTX FREE

## (undated) DEVICE — ELECTRODE PT RET AD L9FT HI MOIST COND ADH HYDRGEL CORDED